# Patient Record
Sex: MALE | Race: WHITE | Employment: OTHER | URBAN - NONMETROPOLITAN AREA
[De-identification: names, ages, dates, MRNs, and addresses within clinical notes are randomized per-mention and may not be internally consistent; named-entity substitution may affect disease eponyms.]

---

## 2017-04-12 RX ORDER — AMLODIPINE BESYLATE 5 MG/1
TABLET ORAL
Qty: 30 TABLET | Refills: 3 | Status: SHIPPED | OUTPATIENT
Start: 2017-04-12 | End: 2017-08-16 | Stop reason: SDUPTHER

## 2017-09-01 ENCOUNTER — HOSPITAL ENCOUNTER (OUTPATIENT)
Dept: NON INVASIVE DIAGNOSTICS | Age: 77
Discharge: HOME | End: 2017-09-01
Payer: MEDICARE

## 2017-09-01 ENCOUNTER — OFFICE VISIT (OUTPATIENT)
Dept: PRIMARY CARE CLINIC | Age: 77
End: 2017-09-01
Payer: MEDICARE

## 2017-09-01 VITALS
BODY MASS INDEX: 37.35 KG/M2 | WEIGHT: 267.8 LBS | TEMPERATURE: 98 F | RESPIRATION RATE: 16 BRPM | SYSTOLIC BLOOD PRESSURE: 144 MMHG | DIASTOLIC BLOOD PRESSURE: 72 MMHG | OXYGEN SATURATION: 95 % | HEART RATE: 68 BPM

## 2017-09-01 DIAGNOSIS — I49.9 IRREGULAR HEART BEAT: Primary | ICD-10-CM

## 2017-09-01 LAB
ANION GAP SERPL CALCULATED.3IONS-SCNC: 17 MMOL/L (ref 7–19)
BUN BLDV-MCNC: 36 MG/DL (ref 8–23)
CALCIUM SERPL-MCNC: 9.5 MG/DL (ref 8.8–10.2)
CHLORIDE BLD-SCNC: 101 MMOL/L (ref 98–111)
CO2: 21 MMOL/L (ref 22–29)
CREAT SERPL-MCNC: 1.3 MG/DL (ref 0.5–1.2)
GFR NON-AFRICAN AMERICAN: 53
GLUCOSE BLD-MCNC: 106 MG/DL (ref 74–109)
POTASSIUM SERPL-SCNC: 4.3 MMOL/L (ref 3.5–5)
SODIUM BLD-SCNC: 139 MMOL/L (ref 136–145)
TSH SERPL DL<=0.05 MIU/L-ACNC: 2.69 UIU/ML (ref 0.27–4.2)

## 2017-09-01 PROCEDURE — 36415 COLL VENOUS BLD VENIPUNCTURE: CPT | Performed by: FAMILY MEDICINE

## 2017-09-01 PROCEDURE — 93225 XTRNL ECG REC<48 HRS REC: CPT

## 2017-09-01 PROCEDURE — 93227 XTRNL ECG REC<48 HR R&I: CPT | Performed by: INTERNAL MEDICINE

## 2017-09-01 PROCEDURE — 93000 ELECTROCARDIOGRAM COMPLETE: CPT | Performed by: FAMILY MEDICINE

## 2017-09-01 PROCEDURE — 99213 OFFICE O/P EST LOW 20 MIN: CPT | Performed by: FAMILY MEDICINE

## 2017-09-01 PROCEDURE — 93226 XTRNL ECG REC<48 HR SCAN A/R: CPT

## 2017-09-10 ASSESSMENT — ENCOUNTER SYMPTOMS
SHORTNESS OF BREATH: 0
COUGH: 0

## 2017-11-13 ENCOUNTER — HOSPITAL ENCOUNTER (OUTPATIENT)
Dept: VASCULAR LAB | Age: 77
Discharge: HOME | End: 2017-11-13
Payer: MEDICARE

## 2017-11-13 ENCOUNTER — TELEPHONE (OUTPATIENT)
Dept: VASCULAR SURGERY | Age: 77
End: 2017-11-13

## 2017-11-13 DIAGNOSIS — I65.23 BILATERAL CAROTID ARTERY STENOSIS: ICD-10-CM

## 2017-11-13 PROCEDURE — 93880 EXTRACRANIAL BILAT STUDY: CPT

## 2017-11-13 NOTE — TELEPHONE ENCOUNTER
I gave the patient his results. The patient voiced understanding.     ----- Message from SHELLY Elder sent at 11/13/2017  1:11 PM CST -----  Looks good.   12 months with carotid u/s

## 2017-11-15 RX ORDER — AMLODIPINE BESYLATE 5 MG/1
TABLET ORAL
Qty: 30 TABLET | Refills: 2 | Status: SHIPPED | OUTPATIENT
Start: 2017-11-15 | End: 2018-02-02 | Stop reason: DRUGHIGH

## 2017-11-24 ENCOUNTER — HOSPITAL ENCOUNTER (EMERGENCY)
Age: 77
Discharge: HOME | End: 2017-11-24
Attending: EMERGENCY MEDICINE
Payer: MEDICARE

## 2017-11-24 ENCOUNTER — OFFICE VISIT (OUTPATIENT)
Dept: URGENT CARE | Age: 77
End: 2017-11-24

## 2017-11-24 ENCOUNTER — APPOINTMENT (OUTPATIENT)
Dept: GENERAL RADIOLOGY | Age: 77
End: 2017-11-24
Payer: MEDICARE

## 2017-11-24 VITALS
WEIGHT: 260 LBS | RESPIRATION RATE: 16 BRPM | BODY MASS INDEX: 36.4 KG/M2 | DIASTOLIC BLOOD PRESSURE: 90 MMHG | HEART RATE: 75 BPM | OXYGEN SATURATION: 99 % | SYSTOLIC BLOOD PRESSURE: 145 MMHG | TEMPERATURE: 97.5 F | HEIGHT: 71 IN

## 2017-11-24 VITALS
RESPIRATION RATE: 18 BRPM | TEMPERATURE: 98.4 F | HEART RATE: 98 BPM | OXYGEN SATURATION: 94 % | DIASTOLIC BLOOD PRESSURE: 80 MMHG | SYSTOLIC BLOOD PRESSURE: 154 MMHG

## 2017-11-24 DIAGNOSIS — R73.9 HYPERGLYCEMIA: Primary | ICD-10-CM

## 2017-11-24 DIAGNOSIS — S92.505A CLOSED NONDISPLACED FRACTURE OF PHALANX OF LESSER TOE OF LEFT FOOT, UNSPECIFIED PHALANX, INITIAL ENCOUNTER: ICD-10-CM

## 2017-11-24 DIAGNOSIS — M79.89 FOOT SWELLING: Primary | ICD-10-CM

## 2017-11-24 DIAGNOSIS — L08.9 SOFT TISSUE INFECTION OF FOOT: ICD-10-CM

## 2017-11-24 LAB
ALBUMIN SERPL-MCNC: 4.2 G/DL (ref 3.5–5.2)
ALP BLD-CCNC: 73 U/L (ref 40–130)
ALT SERPL-CCNC: 54 U/L (ref 5–41)
ANION GAP SERPL CALCULATED.3IONS-SCNC: 12 MMOL/L (ref 7–19)
APTT: 32 SEC (ref 26–36.2)
AST SERPL-CCNC: 27 U/L (ref 5–40)
BASOPHILS ABSOLUTE: 0 K/UL (ref 0–0.2)
BASOPHILS RELATIVE PERCENT: 0.3 % (ref 0–1)
BILIRUB SERPL-MCNC: 0.3 MG/DL (ref 0.2–1.2)
BUN BLDV-MCNC: 19 MG/DL (ref 8–23)
CALCIUM SERPL-MCNC: 9 MG/DL (ref 8.8–10.2)
CHLORIDE BLD-SCNC: 100 MMOL/L (ref 98–111)
CO2: 25 MMOL/L (ref 22–29)
CREAT SERPL-MCNC: 1.2 MG/DL (ref 0.5–1.2)
EOSINOPHILS ABSOLUTE: 0.1 K/UL (ref 0–0.6)
EOSINOPHILS RELATIVE PERCENT: 1 % (ref 0–5)
GFR NON-AFRICAN AMERICAN: 58
GLUCOSE BLD-MCNC: 218 MG/DL (ref 74–109)
HCT VFR BLD CALC: 44.8 % (ref 42–52)
HEMOGLOBIN: 14.9 G/DL (ref 14–18)
INR BLD: 1 (ref 0.88–1.18)
LYMPHOCYTES ABSOLUTE: 1.7 K/UL (ref 1.1–4.5)
LYMPHOCYTES RELATIVE PERCENT: 21.6 % (ref 20–40)
MCH RBC QN AUTO: 32 PG (ref 27–31)
MCHC RBC AUTO-ENTMCNC: 33.3 G/DL (ref 33–37)
MCV RBC AUTO: 96.3 FL (ref 80–94)
MONOCYTES ABSOLUTE: 0.5 K/UL (ref 0–0.9)
MONOCYTES RELATIVE PERCENT: 6.8 % (ref 0–10)
NEUTROPHILS ABSOLUTE: 5.4 K/UL (ref 1.5–7.5)
NEUTROPHILS RELATIVE PERCENT: 69.9 % (ref 50–65)
PDW BLD-RTO: 12.8 % (ref 11.5–14.5)
PLATELET # BLD: 97 K/UL (ref 130–400)
PMV BLD AUTO: 12.6 FL (ref 9.4–12.4)
POTASSIUM SERPL-SCNC: 4.3 MMOL/L (ref 3.5–5)
PROTHROMBIN TIME: 13.1 SEC (ref 12–14.6)
RBC # BLD: 4.65 M/UL (ref 4.7–6.1)
SODIUM BLD-SCNC: 137 MMOL/L (ref 136–145)
TOTAL PROTEIN: 7.4 G/DL (ref 6.6–8.7)
WBC # BLD: 7.8 K/UL (ref 4.8–10.8)

## 2017-11-24 PROCEDURE — 85025 COMPLETE CBC W/AUTO DIFF WBC: CPT

## 2017-11-24 PROCEDURE — 80053 COMPREHEN METABOLIC PANEL: CPT

## 2017-11-24 PROCEDURE — 99284 EMERGENCY DEPT VISIT MOD MDM: CPT | Performed by: EMERGENCY MEDICINE

## 2017-11-24 PROCEDURE — 36415 COLL VENOUS BLD VENIPUNCTURE: CPT

## 2017-11-24 PROCEDURE — 73630 X-RAY EXAM OF FOOT: CPT

## 2017-11-24 PROCEDURE — 85610 PROTHROMBIN TIME: CPT

## 2017-11-24 PROCEDURE — 93971 EXTREMITY STUDY: CPT

## 2017-11-24 PROCEDURE — 99999 PR OFFICE/OUTPT VISIT,PROCEDURE ONLY: CPT | Performed by: NURSE PRACTITIONER

## 2017-11-24 PROCEDURE — 99284 EMERGENCY DEPT VISIT MOD MDM: CPT

## 2017-11-24 PROCEDURE — 85730 THROMBOPLASTIN TIME PARTIAL: CPT

## 2017-11-24 RX ORDER — HYDROCODONE BITARTRATE AND ACETAMINOPHEN 5; 325 MG/1; MG/1
1 TABLET ORAL EVERY 6 HOURS PRN
Qty: 15 TABLET | Refills: 0 | Status: SHIPPED | OUTPATIENT
Start: 2017-11-24 | End: 2017-12-01

## 2017-11-24 RX ORDER — CEPHALEXIN 500 MG/1
500 CAPSULE ORAL 2 TIMES DAILY
Qty: 20 CAPSULE | Refills: 0 | Status: SHIPPED | OUTPATIENT
Start: 2017-11-24 | End: 2018-02-02 | Stop reason: CLARIF

## 2017-11-24 RX ORDER — HYDROCODONE BITARTRATE AND ACETAMINOPHEN 5; 325 MG/1; MG/1
1 TABLET ORAL ONCE
Status: DISCONTINUED | OUTPATIENT
Start: 2017-11-24 | End: 2017-11-24 | Stop reason: HOSPADM

## 2017-11-24 ASSESSMENT — PAIN SCALES - GENERAL: PAINLEVEL_OUTOF10: 2

## 2017-11-24 NOTE — ED PROVIDER NOTES
respiratory distress. He has no wheezes. He has no rales. He exhibits no tenderness. Abdominal: Soft. Bowel sounds are normal. He exhibits no distension. There is no tenderness. There is no rebound and no guarding. Lymphadenopathy:     He has no cervical adenopathy. Neurological: He is alert and oriented to person, place, and time. Skin: Skin is warm and dry. He is not diaphoretic. Psychiatric: He has a normal mood and affect. Nursing note and vitals reviewed. DIAGNOSTIC RESULTS     RADIOLOGY:   Non-plain film images such as CT, Ultrasound and MRI are read by the radiologist.   Interpretation per the Radiologist below, if available at the time of this note:    VL Extremity Venous Left   Final Result      XR FOOT LEFT (MIN 3 VIEWS)   Final Result          LABS:  Labs Reviewed   CBC WITH AUTO DIFFERENTIAL - Abnormal; Notable for the following:        Result Value    RBC 4.65 (*)     MCV 96.3 (*)     MCH 32.0 (*)     Platelets 97 (*)     MPV 12.6 (*)     Neutrophils % 69.9 (*)     All other components within normal limits   COMPREHENSIVE METABOLIC PANEL - Abnormal; Notable for the following:     Glucose 218 (*)     GFR Non- 58 (*)     ALT 54 (*)     All other components within normal limits   PROTIME-INR   APTT       All other labs were within normal range or not returned as of this dictation.     EMERGENCY DEPARTMENT COURSE and DIFFERENTIAL DIAGNOSIS/MDM:   Vitals:    Vitals:    11/24/17 0933 11/24/17 0953 11/24/17 1103 11/24/17 1137   BP: (!) 157/90 (!) 158/90 (!) 151/88 (!) 145/90   Pulse: 89 85 79 75   Resp: 26 16 16 16   Temp: 98.2 °F (36.8 °C)   97.5 °F (36.4 °C)   TempSrc: Oral   Oral   SpO2: 91% 93% 91% 99%   Weight: 260 lb (117.9 kg)      Height: 5' 11\" (1.803 m)              MDM  Number of Diagnoses or Management Options  Closed nondisplaced fracture of phalanx of lesser toe of left foot, unspecified phalanx, initial encounter:   Hyperglycemia:   Soft tissue HYDROcodone-acetaminophen (NORCO) 5-325 MG per tablet Take 1 tablet by mouth every 6 hours as needed for Pain ., Disp-15 tablet, R-0Print             (Please note that portions of this note were completed with a voice recognition program.  Efforts were made to edit the dictations but occasionally words are mis-transcribed.)    ELIAS Arteaga [place]  11/24/17 1567

## 2017-11-24 NOTE — PROGRESS NOTES
Left lower extremity venous duplex performed. No DVT SVT or REFLUX noted   This is a preliminary report. Final report pending.

## 2017-11-27 ENCOUNTER — OFFICE VISIT (OUTPATIENT)
Dept: PRIMARY CARE CLINIC | Age: 77
End: 2017-11-27
Payer: MEDICARE

## 2017-11-27 VITALS
DIASTOLIC BLOOD PRESSURE: 90 MMHG | HEIGHT: 70 IN | WEIGHT: 270 LBS | TEMPERATURE: 98.4 F | RESPIRATION RATE: 18 BRPM | HEART RATE: 88 BPM | OXYGEN SATURATION: 93 % | SYSTOLIC BLOOD PRESSURE: 140 MMHG | BODY MASS INDEX: 38.65 KG/M2

## 2017-11-27 DIAGNOSIS — S92.345D CLOSED NONDISPLACED FRACTURE OF FOURTH METATARSAL BONE OF LEFT FOOT WITH ROUTINE HEALING, SUBSEQUENT ENCOUNTER: Primary | ICD-10-CM

## 2017-11-27 PROCEDURE — 1036F TOBACCO NON-USER: CPT | Performed by: FAMILY MEDICINE

## 2017-11-27 PROCEDURE — G8598 ASA/ANTIPLAT THER USED: HCPCS | Performed by: FAMILY MEDICINE

## 2017-11-27 PROCEDURE — 4040F PNEUMOC VAC/ADMIN/RCVD: CPT | Performed by: FAMILY MEDICINE

## 2017-11-27 PROCEDURE — G8427 DOCREV CUR MEDS BY ELIG CLIN: HCPCS | Performed by: FAMILY MEDICINE

## 2017-11-27 PROCEDURE — 1123F ACP DISCUSS/DSCN MKR DOCD: CPT | Performed by: FAMILY MEDICINE

## 2017-11-27 PROCEDURE — G8417 CALC BMI ABV UP PARAM F/U: HCPCS | Performed by: FAMILY MEDICINE

## 2017-11-27 PROCEDURE — G8483 FLU IMM NO ADMIN DOC REA: HCPCS | Performed by: FAMILY MEDICINE

## 2017-11-27 PROCEDURE — 99213 OFFICE O/P EST LOW 20 MIN: CPT | Performed by: FAMILY MEDICINE

## 2017-12-09 ASSESSMENT — ENCOUNTER SYMPTOMS: RESPIRATORY NEGATIVE: 1

## 2017-12-11 ENCOUNTER — PROCEDURE VISIT (OUTPATIENT)
Dept: OTOLARYNGOLOGY | Facility: CLINIC | Age: 77
End: 2017-12-11

## 2017-12-11 ENCOUNTER — NURSE ONLY (OUTPATIENT)
Dept: PRIMARY CARE CLINIC | Age: 77
End: 2017-12-11
Payer: MEDICARE

## 2017-12-11 VITALS
SYSTOLIC BLOOD PRESSURE: 160 MMHG | TEMPERATURE: 97.9 F | RESPIRATION RATE: 16 BRPM | DIASTOLIC BLOOD PRESSURE: 90 MMHG | HEART RATE: 85 BPM | OXYGEN SATURATION: 97 % | WEIGHT: 264.6 LBS | BODY MASS INDEX: 37.97 KG/M2

## 2017-12-11 DIAGNOSIS — M67.449 DIGITAL MUCINOUS CYST: ICD-10-CM

## 2017-12-11 DIAGNOSIS — M10.9 GOUT OF FOOT, UNSPECIFIED CAUSE, UNSPECIFIED CHRONICITY, UNSPECIFIED LATERALITY: ICD-10-CM

## 2017-12-11 DIAGNOSIS — H90.3 SENSORINEURAL HEARING LOSS (SNHL) OF BOTH EARS: Primary | ICD-10-CM

## 2017-12-11 DIAGNOSIS — I10 ESSENTIAL HYPERTENSION: Primary | ICD-10-CM

## 2017-12-11 LAB — URIC ACID, SERUM: 7.8 MG/DL (ref 3.4–7)

## 2017-12-11 PROCEDURE — 36415 COLL VENOUS BLD VENIPUNCTURE: CPT | Performed by: FAMILY MEDICINE

## 2017-12-11 PROCEDURE — HEARINGNOCHG: Performed by: OTOLARYNGOLOGY

## 2017-12-11 PROCEDURE — 99213 OFFICE O/P EST LOW 20 MIN: CPT | Performed by: FAMILY MEDICINE

## 2017-12-11 ASSESSMENT — ENCOUNTER SYMPTOMS: RESPIRATORY NEGATIVE: 1

## 2017-12-11 NOTE — PROGRESS NOTES
Patient came in with his left HA not working and the top casing coming off. I cleaned both HA's, changed the top casing on both, new waxguards, new wind screens, and new batteries.    Patient stated that he has never been able to hear well out of them and felt lied to by Jose J Hernandez when he bought them. He said he did not feel that Jose J even knew how to program them or just did not care to. He wears them in [employer] all the time, due to static sounds in the Master program.    I hooked him up today and redid the feedback test and sensogram. I then had to decrease the overall mid's and high's due to the patient's voice sounding like he was in a barrel. He said that they were much better, not perfect but better. He also said that the static in Master was no longer there. He will call back after the first of the year to make some more adjustments, when he has more time. Patient has his wife and dog in the car. He was very happy that someone took the time to help him.    No charge per HA agreement.

## 2017-12-12 NOTE — PROGRESS NOTES
Subjective:      Patient ID: Alexi Castano is a 65 y.o. male who came in today for a uric acid level but blood pressure was elevated. HPI. He says he has been eating country ham but denies any chest pain or shortness of breath. He denies headache or visual changes. He wants to know if he needs to be on allopurinol but he doesn't really want to start it unless he has another attack. He is concerned about a cyst on his middle finger which is getting bigger. It does rub against his 2nd finger and causes discomfort. He does not smoke. Family history is positive for hypertension. Review of Systems   Constitutional: Negative. Eyes: Negative for visual disturbance. Respiratory: Negative. Cardiovascular: Negative. Musculoskeletal: Positive for arthralgias. Neurological: Negative for light-headedness and headaches. Hematological: Negative. Objective:   Physical Exam   Constitutional: He is oriented to person, place, and time. He appears well-developed and well-nourished. No distress. HENT:   Head: Normocephalic. Right Ear: External ear normal.   Left Ear: External ear normal.   Mouth/Throat: Oropharynx is clear and moist.   Eyes: Conjunctivae are normal. Pupils are equal, round, and reactive to light. Neck: Normal range of motion. Neck supple. No JVD present. No thyromegaly present. Cardiovascular: Normal rate, regular rhythm, normal heart sounds and intact distal pulses. Pulmonary/Chest: Effort normal and breath sounds normal. No respiratory distress. Abdominal: Soft. Bowel sounds are normal. There is no tenderness. Musculoskeletal: Normal range of motion. He exhibits no edema. He has a large mucin digital cyst on his 3rd finger right hand   Neurological: He is alert and oriented to person, place, and time. Skin: Skin is warm and dry. Psychiatric: He has a normal mood and affect.  His behavior is normal. Judgment and thought content normal.   Vitals reviewed. Assessment:      1. Essential hypertension    2. Digital mucinous cyst    3. Gout of foot, unspecified cause, unspecified chronicity, unspecified laterality            Plan:      MEDICATIONS:  No orders of the defined types were placed in this encounter. ORDERS:  Orders Placed This Encounter   Procedures    Uric Acid   Crystal Diop MD        I'm going to refer him to Dr. Jeanna Bennett for further evaluation of the digital cyst after [place]. I am going to increase his Diovan HCT. Continue amlodipine. Recheck blood pressure in 2 weeks. We will also check a uric acid level. Follow-up:  Return in about 2 weeks (around 12/26/2017) for Blood pressure recheck-nurse. PATIENT INSTRUCTIONS:  Patient Instructions     Continue amlodipine 5 mg 1 tablet at bedtime    Double-up on the Diovan 160/12.5 mg and take 2 tablets once a day in the morning    Recheck blood pressure here on December 26. Patient Education        Gout: Care Instructions  Your Care Instructions    Gout is a form of arthritis caused by a buildup of uric acid crystals in a joint. It causes sudden attacks of pain, swelling, redness, and stiffness, usually in one joint, especially the big toe. Gout usually comes on without a cause. But it can be brought on by drinking alcohol (especially beer) or eating seafood and red meat. Taking certain medicines, such as diuretics or aspirin, also can bring on an attack of gout. Taking your medicines as prescribed and following up with your doctor regularly can help you avoid gout attacks in the future. Follow-up care is a key part of your treatment and safety. Be sure to make and go to all appointments, and call your doctor if you are having problems. It's also a good idea to know your test results and keep a list of the medicines you take. How can you care for yourself at home?   · If the joint is swollen, put ice or a cold pack on the area for 10 to 20 minutes at condition or this instruction, always ask your healthcare professional. [place] [address] any warranty or liability for your use of this information.

## 2017-12-26 RX ORDER — VALSARTAN AND HYDROCHLOROTHIAZIDE 160; 12.5 MG/1; MG/1
TABLET, FILM COATED ORAL
Qty: 90 TABLET | Refills: 3 | Status: SHIPPED | OUTPATIENT
Start: 2017-12-26 | End: 2018-02-02 | Stop reason: ALTCHOICE

## 2017-12-27 ENCOUNTER — TELEPHONE (OUTPATIENT)
Dept: PRIMARY CARE CLINIC | Age: 77
End: 2017-12-27

## 2017-12-27 ENCOUNTER — NURSE ONLY (OUTPATIENT)
Dept: PRIMARY CARE CLINIC | Age: 77
End: 2017-12-27
Payer: MEDICARE

## 2017-12-27 DIAGNOSIS — E78.2 MIXED HYPERLIPIDEMIA: ICD-10-CM

## 2017-12-27 DIAGNOSIS — Z79.899 MEDICATION MANAGEMENT: ICD-10-CM

## 2017-12-27 DIAGNOSIS — Z12.5 SCREENING FOR PROSTATE CANCER: ICD-10-CM

## 2017-12-27 DIAGNOSIS — I10 ESSENTIAL HYPERTENSION: Primary | ICD-10-CM

## 2017-12-27 LAB
ALBUMIN SERPL-MCNC: 4.4 G/DL (ref 3.5–5.2)
ALP BLD-CCNC: 62 U/L (ref 40–130)
ALT SERPL-CCNC: 105 U/L (ref 5–41)
ANION GAP SERPL CALCULATED.3IONS-SCNC: 15 MMOL/L (ref 7–19)
AST SERPL-CCNC: 69 U/L (ref 5–40)
BILIRUB SERPL-MCNC: 0.5 MG/DL (ref 0.2–1.2)
BUN BLDV-MCNC: 22 MG/DL (ref 8–23)
CALCIUM SERPL-MCNC: 9.5 MG/DL (ref 8.8–10.2)
CHLORIDE BLD-SCNC: 101 MMOL/L (ref 98–111)
CHOLESTEROL, TOTAL: 178 MG/DL (ref 160–199)
CO2: 23 MMOL/L (ref 22–29)
CREAT SERPL-MCNC: 1.2 MG/DL (ref 0.5–1.2)
GFR NON-AFRICAN AMERICAN: 58
GLUCOSE BLD-MCNC: 110 MG/DL (ref 74–109)
HCT VFR BLD CALC: 46.3 % (ref 42–52)
HDLC SERPL-MCNC: 35 MG/DL (ref 55–121)
HEMOGLOBIN: 15.5 G/DL (ref 14–18)
LDL CHOLESTEROL CALCULATED: 110 MG/DL
MCH RBC QN AUTO: 31.6 PG (ref 27–31)
MCHC RBC AUTO-ENTMCNC: 33.5 G/DL (ref 33–37)
MCV RBC AUTO: 94.5 FL (ref 80–94)
PDW BLD-RTO: 12.8 % (ref 11.5–14.5)
PLATELET # BLD: 118 K/UL (ref 130–400)
PMV BLD AUTO: 12.9 FL (ref 9.4–12.4)
POTASSIUM SERPL-SCNC: 4.7 MMOL/L (ref 3.5–5)
PROSTATE SPECIFIC ANTIGEN: 1.57 NG/ML (ref 0–4)
RBC # BLD: 4.9 M/UL (ref 4.7–6.1)
SODIUM BLD-SCNC: 139 MMOL/L (ref 136–145)
TOTAL PROTEIN: 7.7 G/DL (ref 6.6–8.7)
TRIGL SERPL-MCNC: 163 MG/DL (ref 0–149)
WBC # BLD: 5.7 K/UL (ref 4.8–10.8)

## 2017-12-27 PROCEDURE — 36415 COLL VENOUS BLD VENIPUNCTURE: CPT | Performed by: FAMILY MEDICINE

## 2017-12-27 NOTE — TELEPHONE ENCOUNTER
Pt came by office and states Dr Reilly Rondon doubled his Diovan Hct 160/12.5 to 2 a day at last OV. He is running out of medication. Tried to refill for him but insurance will not cover at 2 daily . [facility] will refill at dose as ordered by Dr Reilly Rondon and pt will pay out of pocket $25.  Will need to re-evaluate before next refill . Pt does not think he will need to stay on higher dose.  Will come back in for b/p check and re-evaluation of med before next refill--FYI

## 2018-01-29 ENCOUNTER — NURSE ONLY (OUTPATIENT)
Dept: PRIMARY CARE CLINIC | Age: 78
End: 2018-01-29
Payer: MEDICARE

## 2018-01-29 DIAGNOSIS — E79.0 ELEVATED URIC ACID IN BLOOD: ICD-10-CM

## 2018-01-29 DIAGNOSIS — R74.8 ELEVATED LIVER ENZYMES: ICD-10-CM

## 2018-01-29 DIAGNOSIS — R79.89 ABNORMAL CBC: Primary | ICD-10-CM

## 2018-01-29 DIAGNOSIS — E79.0 ELEVATED BLOOD URIC ACID LEVEL: Primary | ICD-10-CM

## 2018-01-29 DIAGNOSIS — D69.6 THROMBOCYTOPENIA (HCC): ICD-10-CM

## 2018-01-29 LAB
ALBUMIN SERPL-MCNC: 4.5 G/DL (ref 3.5–5.2)
ALP BLD-CCNC: 56 U/L (ref 40–130)
ALT SERPL-CCNC: 37 U/L (ref 5–41)
AST SERPL-CCNC: 30 U/L (ref 5–40)
BILIRUB SERPL-MCNC: 0.6 MG/DL (ref 0.2–1.2)
BILIRUBIN DIRECT: 0.1 MG/DL (ref 0–0.3)
BILIRUBIN, INDIRECT: 0.5 MG/DL (ref 0.1–1)
HCT VFR BLD CALC: 49.1 % (ref 42–52)
HEMOGLOBIN: 15.9 G/DL (ref 14–18)
MCH RBC QN AUTO: 31.4 PG (ref 27–31)
MCHC RBC AUTO-ENTMCNC: 32.4 G/DL (ref 33–37)
MCV RBC AUTO: 96.8 FL (ref 80–94)
PDW BLD-RTO: 13.2 % (ref 11.5–14.5)
PLATELET # BLD: 111 K/UL (ref 130–400)
PMV BLD AUTO: 12.9 FL (ref 9.4–12.4)
RBC # BLD: 5.07 M/UL (ref 4.7–6.1)
TOTAL PROTEIN: 8.1 G/DL (ref 6.6–8.7)
URIC ACID, SERUM: 10 MG/DL (ref 3.4–7)
WBC # BLD: 5.9 K/UL (ref 4.8–10.8)

## 2018-01-29 PROCEDURE — 36415 COLL VENOUS BLD VENIPUNCTURE: CPT | Performed by: FAMILY MEDICINE

## 2018-01-29 RX ORDER — ALLOPURINOL 100 MG/1
TABLET ORAL
Qty: 30 TABLET | Refills: 3 | Status: SHIPPED | OUTPATIENT
Start: 2018-01-29 | End: 2018-03-14 | Stop reason: CLARIF

## 2018-02-02 ENCOUNTER — OFFICE VISIT (OUTPATIENT)
Dept: PRIMARY CARE CLINIC | Age: 78
End: 2018-02-02
Payer: MEDICARE

## 2018-02-02 VITALS
HEIGHT: 70 IN | WEIGHT: 248 LBS | RESPIRATION RATE: 20 BRPM | TEMPERATURE: 97.2 F | HEART RATE: 73 BPM | SYSTOLIC BLOOD PRESSURE: 164 MMHG | BODY MASS INDEX: 35.5 KG/M2 | DIASTOLIC BLOOD PRESSURE: 78 MMHG | OXYGEN SATURATION: 97 %

## 2018-02-02 DIAGNOSIS — M10.9 GOUT, UNSPECIFIED CAUSE, UNSPECIFIED CHRONICITY, UNSPECIFIED SITE: ICD-10-CM

## 2018-02-02 DIAGNOSIS — I10 ESSENTIAL HYPERTENSION: Primary | ICD-10-CM

## 2018-02-02 PROCEDURE — G8427 DOCREV CUR MEDS BY ELIG CLIN: HCPCS | Performed by: FAMILY MEDICINE

## 2018-02-02 PROCEDURE — 1123F ACP DISCUSS/DSCN MKR DOCD: CPT | Performed by: FAMILY MEDICINE

## 2018-02-02 PROCEDURE — 4040F PNEUMOC VAC/ADMIN/RCVD: CPT | Performed by: FAMILY MEDICINE

## 2018-02-02 PROCEDURE — G8598 ASA/ANTIPLAT THER USED: HCPCS | Performed by: FAMILY MEDICINE

## 2018-02-02 PROCEDURE — 99213 OFFICE O/P EST LOW 20 MIN: CPT | Performed by: FAMILY MEDICINE

## 2018-02-02 PROCEDURE — 1036F TOBACCO NON-USER: CPT | Performed by: FAMILY MEDICINE

## 2018-02-02 PROCEDURE — G8483 FLU IMM NO ADMIN DOC REA: HCPCS | Performed by: FAMILY MEDICINE

## 2018-02-02 PROCEDURE — G8417 CALC BMI ABV UP PARAM F/U: HCPCS | Performed by: FAMILY MEDICINE

## 2018-02-02 RX ORDER — AMLODIPINE BESYLATE 10 MG/1
TABLET ORAL
Qty: 30 TABLET | Refills: 5 | Status: SHIPPED | OUTPATIENT
Start: 2018-02-02 | End: 2018-08-09 | Stop reason: SDUPTHER

## 2018-02-02 RX ORDER — CLONIDINE HYDROCHLORIDE 0.1 MG/1
TABLET ORAL
Qty: 60 TABLET | Refills: 3 | Status: SHIPPED | OUTPATIENT
Start: 2018-02-02 | End: 2018-08-20 | Stop reason: SDUPTHER

## 2018-02-02 RX ORDER — INDAPAMIDE 1.25 MG/1
TABLET, FILM COATED ORAL
Qty: 30 TABLET | Refills: 5 | Status: SHIPPED | OUTPATIENT
Start: 2018-02-02 | End: 2018-07-30 | Stop reason: SDUPTHER

## 2018-02-02 NOTE — PATIENT INSTRUCTIONS
Stop the Diovan HCT because we do not think it is helping. This is the one that you were taking 2 tablets in the morning. You have a new medication called clonidine 0.1 mg. Take 1 tablet twice a day for blood pressure. He will also have a new fluid pill called indapamide 1.25 mg. You take this 1 tablet once a day in the morning. You are still on amlodipine 5 mg but I am increasing it to 10 mg. This is the one that you take at night. You may take 2 of the 5 mg tablets you have at home until they are gone but I have seen in a new prescription for 1 10 mg tablet. Call me with your blood pressure value from home in one week or run up here and let the nurse check it. I will recheck your blood pressure and lab work with a nurse in 6 weeks. We will check your kidney function, electrolytes and a uric acid level. Patient Education        Gout: Care Instructions  Your Care Instructions    Gout is a form of arthritis caused by a buildup of uric acid crystals in a joint. It causes sudden attacks of pain, swelling, redness, and stiffness, usually in one joint, especially the big toe. Gout usually comes on without a cause. But it can be brought on by drinking alcohol (especially beer) or eating seafood and red meat. Taking certain medicines, such as diuretics or aspirin, also can bring on an attack of gout. Taking your medicines as prescribed and following up with your doctor regularly can help you avoid gout attacks in the future. Follow-up care is a key part of your treatment and safety. Be sure to make and go to all appointments, and call your doctor if you are having problems. It's also a good idea to know your test results and keep a list of the medicines you take. How can you care for yourself at home? · If the joint is swollen, put ice or a cold pack on the area for 10 to 20 minutes at a time. Put a thin cloth between the ice and your skin.   · Prop up the sore limb on a pillow when you ice it or

## 2018-02-04 ASSESSMENT — ENCOUNTER SYMPTOMS: RESPIRATORY NEGATIVE: 1

## 2018-03-09 ENCOUNTER — NURSE ONLY (OUTPATIENT)
Dept: PRIMARY CARE CLINIC | Age: 78
End: 2018-03-09
Payer: MEDICARE

## 2018-03-09 VITALS — DIASTOLIC BLOOD PRESSURE: 74 MMHG | SYSTOLIC BLOOD PRESSURE: 118 MMHG

## 2018-03-09 DIAGNOSIS — D69.6 THROMBOCYTOPENIA (HCC): ICD-10-CM

## 2018-03-09 DIAGNOSIS — E79.0 ELEVATED BLOOD URIC ACID LEVEL: ICD-10-CM

## 2018-03-09 LAB
ANION GAP SERPL CALCULATED.3IONS-SCNC: 15 MMOL/L (ref 7–19)
BUN BLDV-MCNC: 26 MG/DL (ref 8–23)
CALCIUM SERPL-MCNC: 9.5 MG/DL (ref 8.8–10.2)
CHLORIDE BLD-SCNC: 99 MMOL/L (ref 98–111)
CO2: 24 MMOL/L (ref 22–29)
CREAT SERPL-MCNC: 1.5 MG/DL (ref 0.5–1.2)
GFR NON-AFRICAN AMERICAN: 45
GLUCOSE BLD-MCNC: 122 MG/DL (ref 74–109)
HCT VFR BLD CALC: 46.1 % (ref 42–52)
HEMOGLOBIN: 15 G/DL (ref 14–18)
MCH RBC QN AUTO: 31.2 PG (ref 27–31)
MCHC RBC AUTO-ENTMCNC: 32.5 G/DL (ref 33–37)
MCV RBC AUTO: 95.8 FL (ref 80–94)
PDW BLD-RTO: 13.2 % (ref 11.5–14.5)
PLATELET # BLD: 109 K/UL (ref 130–400)
PMV BLD AUTO: 13.2 FL (ref 9.4–12.4)
POTASSIUM SERPL-SCNC: 4.7 MMOL/L (ref 3.5–5)
RBC # BLD: 4.81 M/UL (ref 4.7–6.1)
SODIUM BLD-SCNC: 138 MMOL/L (ref 136–145)
URIC ACID, SERUM: 10.1 MG/DL (ref 3.4–7)
WBC # BLD: 5.6 K/UL (ref 4.8–10.8)

## 2018-03-09 PROCEDURE — 36415 COLL VENOUS BLD VENIPUNCTURE: CPT | Performed by: FAMILY MEDICINE

## 2018-03-14 RX ORDER — ALLOPURINOL 100 MG/1
100 TABLET ORAL 2 TIMES DAILY
Qty: 60 TABLET | Refills: 3 | Status: SHIPPED | OUTPATIENT
Start: 2018-03-14 | End: 2018-07-22 | Stop reason: SDUPTHER

## 2018-03-14 RX ORDER — ALLOPURINOL 100 MG/1
100 TABLET ORAL 2 TIMES DAILY
COMMUNITY
End: 2018-03-14 | Stop reason: SDUPTHER

## 2018-04-12 ENCOUNTER — NURSE ONLY (OUTPATIENT)
Dept: PRIMARY CARE CLINIC | Age: 78
End: 2018-04-12
Payer: MEDICARE

## 2018-04-12 DIAGNOSIS — R94.5 ABNORMAL LIVER FUNCTION: ICD-10-CM

## 2018-04-12 DIAGNOSIS — D69.6 PLATELETS DECREASED (HCC): Primary | ICD-10-CM

## 2018-04-12 DIAGNOSIS — N28.9 ABNORMAL KIDNEY FUNCTION: ICD-10-CM

## 2018-04-12 LAB
ALBUMIN SERPL-MCNC: 4.3 G/DL (ref 3.5–5.2)
ALP BLD-CCNC: 57 U/L (ref 40–130)
ALT SERPL-CCNC: 55 U/L (ref 5–41)
ANION GAP SERPL CALCULATED.3IONS-SCNC: 16 MMOL/L (ref 7–19)
AST SERPL-CCNC: 43 U/L (ref 5–40)
BASOPHILS ABSOLUTE: 0 K/UL (ref 0–0.2)
BASOPHILS RELATIVE PERCENT: 0.2 % (ref 0–1)
BILIRUB SERPL-MCNC: 0.5 MG/DL (ref 0.2–1.2)
BUN BLDV-MCNC: 22 MG/DL (ref 8–23)
CALCIUM SERPL-MCNC: 9.5 MG/DL (ref 8.8–10.2)
CHLORIDE BLD-SCNC: 97 MMOL/L (ref 98–111)
CO2: 24 MMOL/L (ref 22–29)
CREAT SERPL-MCNC: 1.3 MG/DL (ref 0.5–1.2)
EOSINOPHILS ABSOLUTE: 0.1 K/UL (ref 0–0.6)
EOSINOPHILS RELATIVE PERCENT: 2.2 % (ref 0–5)
GFR NON-AFRICAN AMERICAN: 53
GLUCOSE BLD-MCNC: 128 MG/DL (ref 74–109)
HCT VFR BLD CALC: 48.2 % (ref 42–52)
HEMOGLOBIN: 16 G/DL (ref 14–18)
LYMPHOCYTES ABSOLUTE: 2 K/UL (ref 1.1–4.5)
LYMPHOCYTES RELATIVE PERCENT: 36.8 % (ref 20–40)
MCH RBC QN AUTO: 31.7 PG (ref 27–31)
MCHC RBC AUTO-ENTMCNC: 33.2 G/DL (ref 33–37)
MCV RBC AUTO: 95.4 FL (ref 80–94)
MONOCYTES ABSOLUTE: 0.4 K/UL (ref 0–0.9)
MONOCYTES RELATIVE PERCENT: 8.2 % (ref 0–10)
NEUTROPHILS ABSOLUTE: 2.8 K/UL (ref 1.5–7.5)
NEUTROPHILS RELATIVE PERCENT: 52.2 % (ref 50–65)
PDW BLD-RTO: 13.4 % (ref 11.5–14.5)
PLATELET # BLD: 110 K/UL (ref 130–400)
PMV BLD AUTO: 13 FL (ref 9.4–12.4)
POTASSIUM SERPL-SCNC: 4.3 MMOL/L (ref 3.5–5)
RBC # BLD: 5.05 M/UL (ref 4.7–6.1)
SODIUM BLD-SCNC: 137 MMOL/L (ref 136–145)
TOTAL PROTEIN: 7.6 G/DL (ref 6.6–8.7)
WBC # BLD: 5.4 K/UL (ref 4.8–10.8)

## 2018-04-12 PROCEDURE — 36415 COLL VENOUS BLD VENIPUNCTURE: CPT | Performed by: FAMILY MEDICINE

## 2018-07-31 RX ORDER — INDAPAMIDE 1.25 MG/1
TABLET, FILM COATED ORAL
Qty: 30 TABLET | Refills: 5 | Status: SHIPPED | OUTPATIENT
Start: 2018-07-31 | End: 2019-01-28 | Stop reason: SDUPTHER

## 2018-08-02 ENCOUNTER — NURSE ONLY (OUTPATIENT)
Dept: PRIMARY CARE CLINIC | Age: 78
End: 2018-08-02
Payer: MEDICARE

## 2018-08-02 DIAGNOSIS — I10 ESSENTIAL HYPERTENSION: Primary | ICD-10-CM

## 2018-08-02 LAB
ALBUMIN SERPL-MCNC: 4.4 G/DL (ref 3.5–5.2)
ALP BLD-CCNC: 62 U/L (ref 40–130)
ALT SERPL-CCNC: 47 U/L (ref 5–41)
ANION GAP SERPL CALCULATED.3IONS-SCNC: 18 MMOL/L (ref 7–19)
AST SERPL-CCNC: 39 U/L (ref 5–40)
BILIRUB SERPL-MCNC: 0.5 MG/DL (ref 0.2–1.2)
BUN BLDV-MCNC: 20 MG/DL (ref 8–23)
CALCIUM SERPL-MCNC: 9.6 MG/DL (ref 8.8–10.2)
CHLORIDE BLD-SCNC: 101 MMOL/L (ref 98–111)
CO2: 21 MMOL/L (ref 22–29)
CREAT SERPL-MCNC: 1.3 MG/DL (ref 0.5–1.2)
GFR NON-AFRICAN AMERICAN: 53
GLUCOSE BLD-MCNC: 120 MG/DL (ref 74–109)
HCT VFR BLD CALC: 46 % (ref 42–52)
HEMOGLOBIN: 15.1 G/DL (ref 14–18)
MCH RBC QN AUTO: 31.7 PG (ref 27–31)
MCHC RBC AUTO-ENTMCNC: 32.8 G/DL (ref 33–37)
MCV RBC AUTO: 96.4 FL (ref 80–94)
PDW BLD-RTO: 13.2 % (ref 11.5–14.5)
PLATELET # BLD: 100 K/UL (ref 130–400)
PMV BLD AUTO: 12.5 FL (ref 9.4–12.4)
POTASSIUM SERPL-SCNC: 4.6 MMOL/L (ref 3.5–5)
RBC # BLD: 4.77 M/UL (ref 4.7–6.1)
SODIUM BLD-SCNC: 140 MMOL/L (ref 136–145)
TOTAL PROTEIN: 7.5 G/DL (ref 6.6–8.7)
WBC # BLD: 4.4 K/UL (ref 4.8–10.8)

## 2018-08-02 PROCEDURE — 36415 COLL VENOUS BLD VENIPUNCTURE: CPT | Performed by: FAMILY MEDICINE

## 2018-08-10 RX ORDER — AMLODIPINE BESYLATE 10 MG/1
TABLET ORAL
Qty: 30 TABLET | Refills: 5 | Status: SHIPPED | OUTPATIENT
Start: 2018-08-10 | End: 2019-02-04 | Stop reason: SDUPTHER

## 2018-08-21 RX ORDER — CLONIDINE HYDROCHLORIDE 0.1 MG/1
TABLET ORAL
Qty: 60 TABLET | Refills: 3 | Status: SHIPPED | OUTPATIENT
Start: 2018-08-21 | End: 2019-01-14 | Stop reason: SDUPTHER

## 2018-08-21 RX ORDER — ALLOPURINOL 100 MG/1
TABLET ORAL
Qty: 60 TABLET | Refills: 3 | Status: SHIPPED | OUTPATIENT
Start: 2018-08-21 | End: 2018-12-19 | Stop reason: SDUPTHER

## 2018-09-24 ENCOUNTER — TELEPHONE (OUTPATIENT)
Dept: PRIMARY CARE CLINIC | Age: 78
End: 2018-09-24

## 2018-09-24 NOTE — TELEPHONE ENCOUNTER
Please tell him I really do not know a lot about it. They usually let the gastroenterologist do an EGD and see if they think it is needed.

## 2018-10-01 ENCOUNTER — NURSE ONLY (OUTPATIENT)
Dept: PRIMARY CARE CLINIC | Age: 78
End: 2018-10-01
Payer: MEDICARE

## 2018-10-01 VITALS — SYSTOLIC BLOOD PRESSURE: 132 MMHG | DIASTOLIC BLOOD PRESSURE: 82 MMHG

## 2018-10-01 DIAGNOSIS — D72.819 LEUKOPENIA, UNSPECIFIED TYPE: ICD-10-CM

## 2018-10-01 DIAGNOSIS — I10 ESSENTIAL HYPERTENSION: Primary | ICD-10-CM

## 2018-10-01 LAB
ALBUMIN SERPL-MCNC: 4.5 G/DL (ref 3.5–5.2)
ALP BLD-CCNC: 60 U/L (ref 40–130)
ALT SERPL-CCNC: 54 U/L (ref 5–41)
ANION GAP SERPL CALCULATED.3IONS-SCNC: 18 MMOL/L (ref 7–19)
AST SERPL-CCNC: 32 U/L (ref 5–40)
BILIRUB SERPL-MCNC: 0.4 MG/DL (ref 0.2–1.2)
BUN BLDV-MCNC: 20 MG/DL (ref 8–23)
CALCIUM SERPL-MCNC: 9.9 MG/DL (ref 8.8–10.2)
CHLORIDE BLD-SCNC: 99 MMOL/L (ref 98–111)
CO2: 23 MMOL/L (ref 22–29)
CREAT SERPL-MCNC: 1.2 MG/DL (ref 0.5–1.2)
GFR NON-AFRICAN AMERICAN: 58
GLUCOSE BLD-MCNC: 123 MG/DL (ref 74–109)
HCT VFR BLD CALC: 47 % (ref 42–52)
HEMOGLOBIN: 15.8 G/DL (ref 14–18)
MCH RBC QN AUTO: 32.4 PG (ref 27–31)
MCHC RBC AUTO-ENTMCNC: 33.6 G/DL (ref 33–37)
MCV RBC AUTO: 96.3 FL (ref 80–94)
PDW BLD-RTO: 13.1 % (ref 11.5–14.5)
PLATELET # BLD: 106 K/UL (ref 130–400)
PMV BLD AUTO: 12.6 FL (ref 9.4–12.4)
POTASSIUM SERPL-SCNC: 4.1 MMOL/L (ref 3.5–5)
RBC # BLD: 4.88 M/UL (ref 4.7–6.1)
SODIUM BLD-SCNC: 140 MMOL/L (ref 136–145)
TOTAL PROTEIN: 7.4 G/DL (ref 6.6–8.7)
WBC # BLD: 5.9 K/UL (ref 4.8–10.8)

## 2018-10-01 PROCEDURE — 36415 COLL VENOUS BLD VENIPUNCTURE: CPT | Performed by: FAMILY MEDICINE

## 2018-10-04 PROCEDURE — 88305 TISSUE EXAM BY PATHOLOGIST: CPT | Performed by: GENERAL PRACTICE

## 2018-10-05 ENCOUNTER — LAB REQUISITION (OUTPATIENT)
Dept: LAB | Facility: HOSPITAL | Age: 78
End: 2018-10-05

## 2018-10-05 DIAGNOSIS — Z00.00 ENCOUNTER FOR GENERAL ADULT MEDICAL EXAMINATION WITHOUT ABNORMAL FINDINGS: ICD-10-CM

## 2018-10-11 LAB
CYTO UR: NORMAL
LAB AP CASE REPORT: NORMAL
LAB AP CLINICAL INFORMATION: NORMAL
PATH REPORT.FINAL DX SPEC: NORMAL
PATH REPORT.GROSS SPEC: NORMAL

## 2018-12-21 RX ORDER — ALLOPURINOL 100 MG/1
TABLET ORAL
Qty: 60 TABLET | Refills: 3 | Status: SHIPPED | OUTPATIENT
Start: 2018-12-21 | End: 2019-09-10 | Stop reason: SDUPTHER

## 2019-01-03 ENCOUNTER — NURSE ONLY (OUTPATIENT)
Dept: PRIMARY CARE CLINIC | Age: 79
End: 2019-01-03
Payer: MEDICARE

## 2019-01-03 DIAGNOSIS — M79.671 FOOT PAIN, BILATERAL: Primary | ICD-10-CM

## 2019-01-03 DIAGNOSIS — E79.0 ELEVATED URIC ACID IN BLOOD: ICD-10-CM

## 2019-01-03 DIAGNOSIS — M79.672 FOOT PAIN, BILATERAL: Primary | ICD-10-CM

## 2019-01-03 LAB — URIC ACID, SERUM: 7.9 MG/DL (ref 3.4–7)

## 2019-01-03 PROCEDURE — 36415 COLL VENOUS BLD VENIPUNCTURE: CPT | Performed by: FAMILY MEDICINE

## 2019-01-14 RX ORDER — CLONIDINE HYDROCHLORIDE 0.1 MG/1
TABLET ORAL
Qty: 60 TABLET | Refills: 3 | Status: SHIPPED | OUTPATIENT
Start: 2019-01-14 | End: 2019-05-17 | Stop reason: SDUPTHER

## 2019-01-29 RX ORDER — INDAPAMIDE 1.25 MG/1
TABLET, FILM COATED ORAL
Qty: 30 TABLET | Refills: 5 | Status: SHIPPED | OUTPATIENT
Start: 2019-01-29 | End: 2019-07-25 | Stop reason: SDUPTHER

## 2019-02-05 RX ORDER — AMLODIPINE BESYLATE 10 MG/1
TABLET ORAL
Qty: 30 TABLET | Refills: 5 | Status: SHIPPED | OUTPATIENT
Start: 2019-02-05 | End: 2019-08-11 | Stop reason: SDUPTHER

## 2019-05-17 RX ORDER — CLONIDINE HYDROCHLORIDE 0.1 MG/1
TABLET ORAL
Qty: 60 TABLET | Refills: 3 | Status: SHIPPED | OUTPATIENT
Start: 2019-05-17 | End: 2021-04-06 | Stop reason: SDUPTHER

## 2019-06-17 RX ORDER — CLONIDINE HYDROCHLORIDE 0.1 MG/1
TABLET ORAL
Qty: 60 TABLET | Refills: 3 | Status: SHIPPED | OUTPATIENT
Start: 2019-06-17 | End: 2019-09-30 | Stop reason: SDUPTHER

## 2019-07-24 ENCOUNTER — TELEPHONE (OUTPATIENT)
Dept: PRIMARY CARE CLINIC | Age: 79
End: 2019-07-24

## 2019-07-25 RX ORDER — INDAPAMIDE 1.25 MG/1
TABLET, FILM COATED ORAL
Qty: 30 TABLET | Refills: 5 | Status: SHIPPED | OUTPATIENT
Start: 2019-07-25 | End: 2019-09-30 | Stop reason: SDUPTHER

## 2019-08-12 RX ORDER — AMLODIPINE BESYLATE 10 MG/1
TABLET ORAL
Qty: 30 TABLET | Refills: 5 | Status: SHIPPED | OUTPATIENT
Start: 2019-08-12 | End: 2019-09-30 | Stop reason: SDUPTHER

## 2019-09-06 ENCOUNTER — NURSE ONLY (OUTPATIENT)
Dept: PRIMARY CARE CLINIC | Age: 79
End: 2019-09-06
Payer: MEDICARE

## 2019-09-06 VITALS — DIASTOLIC BLOOD PRESSURE: 74 MMHG | SYSTOLIC BLOOD PRESSURE: 128 MMHG

## 2019-09-06 DIAGNOSIS — E79.0 ELEVATED URIC ACID IN BLOOD: ICD-10-CM

## 2019-09-06 DIAGNOSIS — I10 ESSENTIAL HYPERTENSION: Primary | ICD-10-CM

## 2019-09-06 DIAGNOSIS — D72.819 LEUKOPENIA, UNSPECIFIED TYPE: ICD-10-CM

## 2019-09-06 DIAGNOSIS — E78.2 MIXED HYPERLIPIDEMIA: ICD-10-CM

## 2019-09-06 DIAGNOSIS — Z12.5 SCREENING PSA (PROSTATE SPECIFIC ANTIGEN): ICD-10-CM

## 2019-09-06 LAB
ALBUMIN SERPL-MCNC: 4.4 G/DL (ref 3.5–5.2)
ALP BLD-CCNC: 59 U/L (ref 40–130)
ALT SERPL-CCNC: 39 U/L (ref 5–41)
ANION GAP SERPL CALCULATED.3IONS-SCNC: 12 MMOL/L (ref 7–19)
AST SERPL-CCNC: 32 U/L (ref 5–40)
BILIRUB SERPL-MCNC: 0.5 MG/DL (ref 0.2–1.2)
BUN BLDV-MCNC: 21 MG/DL (ref 8–23)
CALCIUM SERPL-MCNC: 9.6 MG/DL (ref 8.8–10.2)
CHLORIDE BLD-SCNC: 100 MMOL/L (ref 98–111)
CHOLESTEROL, TOTAL: 179 MG/DL (ref 160–199)
CO2: 25 MMOL/L (ref 22–29)
CREAT SERPL-MCNC: 1.2 MG/DL (ref 0.5–1.2)
GFR NON-AFRICAN AMERICAN: 58
GLUCOSE BLD-MCNC: 164 MG/DL (ref 74–109)
HCT VFR BLD CALC: 45.9 % (ref 42–52)
HDLC SERPL-MCNC: 41 MG/DL (ref 55–121)
HEMOGLOBIN: 15.2 G/DL (ref 14–18)
LDL CHOLESTEROL CALCULATED: 93 MG/DL
MCH RBC QN AUTO: 32.2 PG (ref 27–31)
MCHC RBC AUTO-ENTMCNC: 33.1 G/DL (ref 33–37)
MCV RBC AUTO: 97.2 FL (ref 80–94)
PDW BLD-RTO: 13.1 % (ref 11.5–14.5)
PLATELET # BLD: 117 K/UL (ref 130–400)
PMV BLD AUTO: 13 FL (ref 9.4–12.4)
POTASSIUM SERPL-SCNC: 4.2 MMOL/L (ref 3.5–5)
PROSTATE SPECIFIC ANTIGEN: 1.28 NG/ML (ref 0–4)
RBC # BLD: 4.72 M/UL (ref 4.7–6.1)
SODIUM BLD-SCNC: 137 MMOL/L (ref 136–145)
TOTAL PROTEIN: 7.6 G/DL (ref 6.6–8.7)
TRIGL SERPL-MCNC: 225 MG/DL (ref 0–149)
URIC ACID, SERUM: 7.5 MG/DL (ref 3.4–7)
WBC # BLD: 5.7 K/UL (ref 4.8–10.8)

## 2019-09-06 PROCEDURE — 36415 COLL VENOUS BLD VENIPUNCTURE: CPT | Performed by: FAMILY MEDICINE

## 2019-09-10 ENCOUNTER — OFFICE VISIT (OUTPATIENT)
Dept: PRIMARY CARE CLINIC | Age: 79
End: 2019-09-10
Payer: MEDICARE

## 2019-09-10 VITALS
OXYGEN SATURATION: 94 % | BODY MASS INDEX: 35.11 KG/M2 | TEMPERATURE: 98.3 F | HEART RATE: 82 BPM | RESPIRATION RATE: 18 BRPM | SYSTOLIC BLOOD PRESSURE: 135 MMHG | DIASTOLIC BLOOD PRESSURE: 80 MMHG | WEIGHT: 250.8 LBS | HEIGHT: 71 IN

## 2019-09-10 DIAGNOSIS — E11.9 TYPE 2 DIABETES MELLITUS WITHOUT COMPLICATION, WITHOUT LONG-TERM CURRENT USE OF INSULIN (HCC): ICD-10-CM

## 2019-09-10 DIAGNOSIS — E78.2 MIXED HYPERLIPIDEMIA: ICD-10-CM

## 2019-09-10 DIAGNOSIS — M70.22 OLECRANON BURSITIS OF LEFT ELBOW: Primary | ICD-10-CM

## 2019-09-10 PROCEDURE — 4040F PNEUMOC VAC/ADMIN/RCVD: CPT | Performed by: FAMILY MEDICINE

## 2019-09-10 PROCEDURE — G8598 ASA/ANTIPLAT THER USED: HCPCS | Performed by: FAMILY MEDICINE

## 2019-09-10 PROCEDURE — 1036F TOBACCO NON-USER: CPT | Performed by: FAMILY MEDICINE

## 2019-09-10 PROCEDURE — G8427 DOCREV CUR MEDS BY ELIG CLIN: HCPCS | Performed by: FAMILY MEDICINE

## 2019-09-10 PROCEDURE — 99214 OFFICE O/P EST MOD 30 MIN: CPT | Performed by: FAMILY MEDICINE

## 2019-09-10 PROCEDURE — G8417 CALC BMI ABV UP PARAM F/U: HCPCS | Performed by: FAMILY MEDICINE

## 2019-09-10 PROCEDURE — 1123F ACP DISCUSS/DSCN MKR DOCD: CPT | Performed by: FAMILY MEDICINE

## 2019-09-10 RX ORDER — ALLOPURINOL 100 MG/1
100 TABLET ORAL 2 TIMES DAILY
Qty: 60 TABLET | Refills: 3 | Status: SHIPPED | OUTPATIENT
Start: 2019-09-10 | End: 2019-09-30 | Stop reason: SDUPTHER

## 2019-09-10 ASSESSMENT — PATIENT HEALTH QUESTIONNAIRE - PHQ9
1. LITTLE INTEREST OR PLEASURE IN DOING THINGS: 0
SUM OF ALL RESPONSES TO PHQ QUESTIONS 1-9: 0
SUM OF ALL RESPONSES TO PHQ9 QUESTIONS 1 & 2: 0
SUM OF ALL RESPONSES TO PHQ QUESTIONS 1-9: 0
2. FEELING DOWN, DEPRESSED OR HOPELESS: 0

## 2019-09-10 ASSESSMENT — ENCOUNTER SYMPTOMS
DIARRHEA: 0
RESPIRATORY NEGATIVE: 1

## 2019-09-10 NOTE — PATIENT INSTRUCTIONS
Patient Education        Learning About Type 2 Diabetes  What is type 2 diabetes? Insulin is a hormone that helps your body use sugar from your food as energy. Type 2 diabetes happens when your body can't use insulin the right way. Over time, the pancreas can't make enough insulin. If you don't have enough insulin, too much sugar stays in your blood. If you are overweight, get little or no exercise, or have type 2 diabetes in your family, you are more likely to have problems with the way insulin works in your body.  Americans, Hispanics, Native Americans,  Americans, and Pacific Islanders have a higher risk for type 2 diabetes. Type 2 diabetes can be prevented or delayed with a healthy lifestyle, which includes staying at a healthy weight, making smart food choices, and getting regular exercise. What can you expect with type 2 diabetes? Olga Lidia Vogt keep hearing about how important it is to keep your blood sugar within a target range. That's because over time, high blood sugar can lead to serious problems. It can:  · Harm your eyes, nerves, and kidneys. · Damage your blood vessels, leading to heart disease and stroke. · Reduce blood flow and cause nerve damage to parts of your body, especially your feet. This can cause slow healing and pain when you walk. · Make your immune system weak and less able to fight infections. When people hear the word \"diabetes,\" they often think of problems like these. But daily care and treatment can help prevent or delay these problems. The goal is to keep your blood sugar in a target range. That's the best way to reduce your chance of having more problems from diabetes. What are the symptoms? Some people who have type 2 diabetes may not have any symptoms early on. Many people with the disease don't even know they have it at first. But with time, diabetes starts to cause symptoms.  You experience most symptoms of type 2 diabetes when your blood sugar is either too any warranty or liability for your use of this information. Patient Education        Elbow Bursitis: Exercises  Introduction  Here are some examples of exercises for you to try. The exercises may be suggested for a condition or for rehabilitation. Start each exercise slowly. Ease off the exercises if you start to have pain. You will be told when to start these exercises and which ones will work best for you. How to do the exercises  Elbow flexion stretch    1. Lift the arm that bothers you, and bend the elbow. Your palm should face toward you. 2. With your other hand, gently push on the back of your affected forearm. Press your hand toward your shoulder until you feel a stretch in the back of your upper arm. 3. Hold for at least 15 to 30 seconds. 4. Repeat 2 to 4 times. Elbow extension stretch    1. Extend your affected arm in front of you with your palm facing away from you. 2. Bend back your wrist, pointing your hand up toward the ceiling. 3. With your other hand, gently bend your wrist farther until you feel a mild to moderate stretch in your forearm. 4. Hold for at least 15 to 30 seconds. 5. Repeat 2 to 4 times. 6. Repeat steps 1 through 5. But this time extend your affected arm in front of you with your palm facing up. Then bend back your wrist, pointing your hand toward the floor. Pronation and supination stretch    1. Keep your affected elbow at your side, bent at about 90 degrees. Grasp a pen, pencil, or stick, and wrap your hand around it. If you don't have something to hold on to, make a fist instead. 2. Slowly turn your forearm as far as you can back and forth in each direction. Your hand should face up and then down. 3. Hold each position for about 6 seconds. 4. Relax for up to 10 seconds between repetitions. 5. Repeat 8 to 12 times. Hand flips    1. While seated, place your affected forearm on your thigh. Your palm should face down.   2. Flip your hand over so the back of

## 2019-09-11 DIAGNOSIS — R73.09 ELEVATED GLUCOSE: Primary | ICD-10-CM

## 2019-09-11 LAB — HBA1C MFR BLD: 6.2 % (ref 4–6)

## 2019-09-11 PROCEDURE — 36415 COLL VENOUS BLD VENIPUNCTURE: CPT | Performed by: FAMILY MEDICINE

## 2019-09-11 NOTE — PROGRESS NOTES
Subjective:      Patient ID: Tin Lovell is a [de-identified] y.o. male who comes in today complaining that his left elbow with swelling. HPI it actually started 6-8 weeks ago but it's getting better because he looked it up on the Internet and has been doing exercises. He denies injury. He also had lab work done and wants to go over it. His uric acid was 7.5. He did have a flareup of gout he wants to know if he needs to be on allopurinol. PSA is 1.28. Triglycerides 225 with an HDL of 41. LDL is 93. He is not anemic the platelets are low at 117,000. Glucose was elevated at 164. GFR was 58. He thinks that his grandmother was a diabetic but he's never had any problems with it. Review of Systems   Constitutional: Positive for activity change. Negative for fatigue. Eyes: Negative for visual disturbance. Respiratory: Negative. Cardiovascular: Negative. Gastrointestinal: Negative for diarrhea. Endocrine: Negative for polydipsia, polyphagia and polyuria. Genitourinary: Negative for dysuria. Musculoskeletal: Positive for arthralgias and joint swelling. Neurological: Negative for weakness and numbness. Hematological: Negative. Psychiatric/Behavioral: Negative. Objective:   Physical Exam   Constitutional: He is oriented to person, place, and time. He appears well-developed and well-nourished. No distress. HENT:   Head: Normocephalic. Right Ear: External ear normal.   Left Ear: External ear normal.   Mouth/Throat: Oropharynx is clear and moist.   Eyes: Pupils are equal, round, and reactive to light. Conjunctivae are normal.   Neck: Normal range of motion. Neck supple. No JVD present. No thyromegaly present. Cardiovascular: Normal rate, regular rhythm, normal heart sounds and intact distal pulses. Pulmonary/Chest: Effort normal and breath sounds normal. No respiratory distress. Abdominal: Soft. Bowel sounds are normal. There is no tenderness. Musculoskeletal: Normal range of motion.  He exhibits no edema. Elbow is normal now   Lymphadenopathy:     He has no cervical adenopathy. Neurological: He is alert and oriented to person, place, and time. Sensory exam of the foot is normal, tested with the monofilament. Good pulses, no lesions or ulcers, good peripheral pulses. Skin: Skin is warm and dry. Psychiatric: He has a normal mood and affect. His behavior is normal. Judgment and thought content normal.   Vitals reviewed. Assessment:      1. Olecranon bursitis of left elbow    2. Mixed hyperlipidemia    3. Type 2 diabetes mellitus without complication, without long-term current use of insulin ([facility] [address]            Plan:      MEDICATIONS:  No orders of the defined types were placed in this encounter. We are going to add a hemoglobin A1c. We probably do need to begin allopurinol but I will wait until we get the hemoglobin A1c. ORDERS:  Orders Placed This Encounter   Procedures    External Referral To Diabetic Education    HM DIABETES FOOT EXAM     He does not meet the criteria for diabetic shoes. I'm going to refer him to the diabetic educator at the end of September beginning of October once he returns from his cruise. I will also start him on metformin at that time. The bursitis of his left elbow is better. Exercises were given. I did review all of his labs that were done on September 6, 2019. Glucose was elevated at 164 with a hemoglobin A1c of 7. Follow-up:  Return in about 3 months (around 12/10/2019) for Diabetes recheck with me. Ly Schmidt MD    EMR Dragon/transcription disclaimer:  Muchof this encounter note is electronic transcription/translation of spoken language to printed texts. The electronic translation of spoken language may be erroneous, or at times, nonsensical words or phrases may beinadvertently transcribed.   Although I have reviewed the note for such errors, some may still exist.

## 2019-09-27 ENCOUNTER — NURSE ONLY (OUTPATIENT)
Dept: PRIMARY CARE CLINIC | Age: 79
End: 2019-09-27
Payer: MEDICARE

## 2019-09-27 DIAGNOSIS — D72.819 LEUKOPENIA, UNSPECIFIED TYPE: Primary | ICD-10-CM

## 2019-09-27 LAB
BASOPHILS ABSOLUTE: 0 K/UL (ref 0–0.2)
BASOPHILS RELATIVE PERCENT: 0.5 % (ref 0–1)
EOSINOPHILS ABSOLUTE: 0.2 K/UL (ref 0–0.6)
EOSINOPHILS RELATIVE PERCENT: 2.8 % (ref 0–5)
HCT VFR BLD CALC: 47.7 % (ref 42–52)
HEMOGLOBIN: 15.6 G/DL (ref 14–18)
IMMATURE GRANULOCYTES #: 0 K/UL
LYMPHOCYTES ABSOLUTE: 1.9 K/UL (ref 1.1–4.5)
LYMPHOCYTES RELATIVE PERCENT: 31.1 % (ref 20–40)
MCH RBC QN AUTO: 32.4 PG (ref 27–31)
MCHC RBC AUTO-ENTMCNC: 32.7 G/DL (ref 33–37)
MCV RBC AUTO: 99.2 FL (ref 80–94)
MONOCYTES ABSOLUTE: 0.5 K/UL (ref 0–0.9)
MONOCYTES RELATIVE PERCENT: 7.7 % (ref 0–10)
NEUTROPHILS ABSOLUTE: 3.5 K/UL (ref 1.5–7.5)
NEUTROPHILS RELATIVE PERCENT: 57.6 % (ref 50–65)
PDW BLD-RTO: 13.3 % (ref 11.5–14.5)
PLATELET # BLD: 118 K/UL (ref 130–400)
PMV BLD AUTO: 12.3 FL (ref 9.4–12.4)
RBC # BLD: 4.81 M/UL (ref 4.7–6.1)
WBC # BLD: 6 K/UL (ref 4.8–10.8)

## 2019-09-27 PROCEDURE — 36415 COLL VENOUS BLD VENIPUNCTURE: CPT | Performed by: FAMILY MEDICINE

## 2019-09-30 RX ORDER — INDAPAMIDE 1.25 MG/1
1.25 TABLET, FILM COATED ORAL DAILY
Qty: 90 TABLET | Refills: 3 | Status: SHIPPED | OUTPATIENT
Start: 2019-09-30 | End: 2020-08-06

## 2019-09-30 RX ORDER — AMLODIPINE BESYLATE 10 MG/1
10 TABLET ORAL NIGHTLY
Qty: 90 TABLET | Refills: 3 | Status: SHIPPED | OUTPATIENT
Start: 2019-09-30 | End: 2020-08-06

## 2019-09-30 RX ORDER — ALLOPURINOL 100 MG/1
100 TABLET ORAL 2 TIMES DAILY
Qty: 180 TABLET | Refills: 3 | Status: SHIPPED | OUTPATIENT
Start: 2019-09-30 | End: 2019-10-01 | Stop reason: SDUPTHER

## 2019-09-30 RX ORDER — CLONIDINE HYDROCHLORIDE 0.1 MG/1
0.1 TABLET ORAL 2 TIMES DAILY
Qty: 180 TABLET | Refills: 3 | Status: SHIPPED | OUTPATIENT
Start: 2019-09-30 | End: 2020-08-06

## 2019-09-30 RX ORDER — FUROSEMIDE 20 MG/1
TABLET ORAL
Qty: 90 TABLET | Refills: 3 | Status: SHIPPED | OUTPATIENT
Start: 2019-09-30 | End: 2019-10-02 | Stop reason: SDUPTHER

## 2019-10-01 RX ORDER — ALLOPURINOL 100 MG/1
100 TABLET ORAL 2 TIMES DAILY
Qty: 60 TABLET | Refills: 0 | Status: SHIPPED | OUTPATIENT
Start: 2019-10-01 | End: 2019-10-07 | Stop reason: SDUPTHER

## 2019-10-02 RX ORDER — FUROSEMIDE 20 MG/1
TABLET ORAL
Qty: 90 TABLET | Refills: 3 | Status: SHIPPED | OUTPATIENT
Start: 2019-10-02 | End: 2020-08-06

## 2019-10-07 RX ORDER — ALLOPURINOL 100 MG/1
100 TABLET ORAL 2 TIMES DAILY
Qty: 180 TABLET | Refills: 3 | Status: SHIPPED | OUTPATIENT
Start: 2019-10-07 | End: 2021-03-02

## 2019-10-31 ENCOUNTER — TELEPHONE (OUTPATIENT)
Dept: PRIMARY CARE CLINIC | Age: 79
End: 2019-10-31

## 2019-12-18 ENCOUNTER — OFFICE VISIT (OUTPATIENT)
Dept: PRIMARY CARE CLINIC | Age: 79
End: 2019-12-18
Payer: MEDICARE

## 2019-12-18 VITALS
WEIGHT: 256 LBS | RESPIRATION RATE: 18 BRPM | TEMPERATURE: 97.7 F | HEIGHT: 70 IN | BODY MASS INDEX: 36.65 KG/M2 | OXYGEN SATURATION: 96 % | HEART RATE: 78 BPM | SYSTOLIC BLOOD PRESSURE: 121 MMHG | DIASTOLIC BLOOD PRESSURE: 80 MMHG

## 2019-12-18 DIAGNOSIS — E11.9 TYPE 2 DIABETES MELLITUS WITHOUT COMPLICATION, WITHOUT LONG-TERM CURRENT USE OF INSULIN (HCC): Primary | ICD-10-CM

## 2019-12-18 LAB
ANION GAP SERPL CALCULATED.3IONS-SCNC: 14 MMOL/L (ref 7–19)
BUN BLDV-MCNC: 23 MG/DL (ref 8–23)
CALCIUM SERPL-MCNC: 9.9 MG/DL (ref 8.8–10.2)
CHLORIDE BLD-SCNC: 96 MMOL/L (ref 98–111)
CO2: 28 MMOL/L (ref 22–29)
CREAT SERPL-MCNC: 1.5 MG/DL (ref 0.5–1.2)
GFR NON-AFRICAN AMERICAN: 45
GLUCOSE BLD-MCNC: 147 MG/DL (ref 74–109)
HBA1C MFR BLD: 7 % (ref 4–6)
POTASSIUM SERPL-SCNC: 4.2 MMOL/L (ref 3.5–5)
SODIUM BLD-SCNC: 138 MMOL/L (ref 136–145)

## 2019-12-18 PROCEDURE — 1036F TOBACCO NON-USER: CPT | Performed by: FAMILY MEDICINE

## 2019-12-18 PROCEDURE — 36415 COLL VENOUS BLD VENIPUNCTURE: CPT | Performed by: FAMILY MEDICINE

## 2019-12-18 PROCEDURE — G8417 CALC BMI ABV UP PARAM F/U: HCPCS | Performed by: FAMILY MEDICINE

## 2019-12-18 PROCEDURE — G8483 FLU IMM NO ADMIN DOC REA: HCPCS | Performed by: FAMILY MEDICINE

## 2019-12-18 PROCEDURE — 4040F PNEUMOC VAC/ADMIN/RCVD: CPT | Performed by: FAMILY MEDICINE

## 2019-12-18 PROCEDURE — 99213 OFFICE O/P EST LOW 20 MIN: CPT | Performed by: FAMILY MEDICINE

## 2019-12-18 PROCEDURE — G8598 ASA/ANTIPLAT THER USED: HCPCS | Performed by: FAMILY MEDICINE

## 2019-12-18 PROCEDURE — G8427 DOCREV CUR MEDS BY ELIG CLIN: HCPCS | Performed by: FAMILY MEDICINE

## 2019-12-18 PROCEDURE — 1123F ACP DISCUSS/DSCN MKR DOCD: CPT | Performed by: FAMILY MEDICINE

## 2019-12-22 ASSESSMENT — ENCOUNTER SYMPTOMS
DIARRHEA: 0
RESPIRATORY NEGATIVE: 1

## 2020-06-15 NOTE — TELEPHONE ENCOUNTER
Med sent
Pt is requesting a 90 day with refill's for  Marino Ibanez sent to [facility] order plesae
c/o l lower tooth pain x 2 days used oragel and ibuprofen with some relief didn't take today

## 2020-08-06 RX ORDER — AMLODIPINE BESYLATE 10 MG/1
TABLET ORAL
Qty: 90 TABLET | Refills: 3 | Status: SHIPPED | OUTPATIENT
Start: 2020-08-06 | End: 2021-03-01 | Stop reason: SDUPTHER

## 2020-08-06 RX ORDER — FUROSEMIDE 20 MG/1
TABLET ORAL
Qty: 90 TABLET | Refills: 3 | Status: SHIPPED | OUTPATIENT
Start: 2020-08-06 | End: 2021-05-27

## 2020-08-06 RX ORDER — CLONIDINE HYDROCHLORIDE 0.1 MG/1
TABLET ORAL
Qty: 180 TABLET | Refills: 3 | Status: SHIPPED | OUTPATIENT
Start: 2020-08-06 | End: 2021-03-01 | Stop reason: SDUPTHER

## 2020-08-06 RX ORDER — INDAPAMIDE 1.25 MG/1
TABLET, FILM COATED ORAL
Qty: 90 TABLET | Refills: 3 | Status: SHIPPED | OUTPATIENT
Start: 2020-08-06 | End: 2021-06-28

## 2020-08-19 ENCOUNTER — TELEPHONE (OUTPATIENT)
Dept: ADMINISTRATIVE | Age: 80
End: 2020-08-19

## 2020-08-19 NOTE — TELEPHONE ENCOUNTER
Nitin Marcelo requests that a nurse return their call. Pt calling to have labs drawn and there are no orders in the system for him The best time to reach him is Anytime. Thank you.

## 2020-08-24 ENCOUNTER — OFFICE VISIT (OUTPATIENT)
Dept: PRIMARY CARE CLINIC | Age: 80
End: 2020-08-24
Payer: MEDICARE

## 2020-08-24 VITALS
WEIGHT: 246.4 LBS | SYSTOLIC BLOOD PRESSURE: 150 MMHG | HEART RATE: 74 BPM | HEIGHT: 71 IN | DIASTOLIC BLOOD PRESSURE: 90 MMHG | TEMPERATURE: 97.6 F | OXYGEN SATURATION: 94 % | BODY MASS INDEX: 34.5 KG/M2 | RESPIRATION RATE: 18 BRPM

## 2020-08-24 LAB
ALBUMIN SERPL-MCNC: 4.7 G/DL (ref 3.5–5.2)
ALP BLD-CCNC: 56 U/L (ref 40–130)
ALT SERPL-CCNC: 26 U/L (ref 5–41)
ANION GAP SERPL CALCULATED.3IONS-SCNC: 17 MMOL/L (ref 7–19)
AST SERPL-CCNC: 24 U/L (ref 5–40)
BILIRUB SERPL-MCNC: 0.5 MG/DL (ref 0.2–1.2)
BUN BLDV-MCNC: 18 MG/DL (ref 8–23)
CALCIUM SERPL-MCNC: 9.9 MG/DL (ref 8.8–10.2)
CHLORIDE BLD-SCNC: 97 MMOL/L (ref 98–111)
CO2: 22 MMOL/L (ref 22–29)
CREAT SERPL-MCNC: 1.2 MG/DL (ref 0.5–1.2)
GFR AFRICAN AMERICAN: >59
GFR NON-AFRICAN AMERICAN: 58
GLUCOSE BLD-MCNC: 95 MG/DL (ref 74–109)
HBA1C MFR BLD: 5.7 % (ref 4–6)
HCT VFR BLD CALC: 47.8 % (ref 42–52)
HEMOGLOBIN: 15.9 G/DL (ref 14–18)
MCH RBC QN AUTO: 32.6 PG (ref 27–31)
MCHC RBC AUTO-ENTMCNC: 33.3 G/DL (ref 33–37)
MCV RBC AUTO: 98 FL (ref 80–94)
PDW BLD-RTO: 13 % (ref 11.5–14.5)
PLATELET # BLD: 115 K/UL (ref 130–400)
PMV BLD AUTO: 12.9 FL (ref 9.4–12.4)
POTASSIUM SERPL-SCNC: 4.2 MMOL/L (ref 3.5–5)
RBC # BLD: 4.88 M/UL (ref 4.7–6.1)
SODIUM BLD-SCNC: 136 MMOL/L (ref 136–145)
TOTAL PROTEIN: 7.8 G/DL (ref 6.6–8.7)
WBC # BLD: 6.7 K/UL (ref 4.8–10.8)

## 2020-08-24 PROCEDURE — 36415 COLL VENOUS BLD VENIPUNCTURE: CPT | Performed by: FAMILY MEDICINE

## 2020-08-24 PROCEDURE — 4040F PNEUMOC VAC/ADMIN/RCVD: CPT | Performed by: FAMILY MEDICINE

## 2020-08-24 PROCEDURE — 99213 OFFICE O/P EST LOW 20 MIN: CPT | Performed by: FAMILY MEDICINE

## 2020-08-24 PROCEDURE — 1123F ACP DISCUSS/DSCN MKR DOCD: CPT | Performed by: FAMILY MEDICINE

## 2020-08-24 PROCEDURE — 4004F PT TOBACCO SCREEN RCVD TLK: CPT | Performed by: FAMILY MEDICINE

## 2020-08-24 PROCEDURE — G8417 CALC BMI ABV UP PARAM F/U: HCPCS | Performed by: FAMILY MEDICINE

## 2020-08-24 PROCEDURE — G8427 DOCREV CUR MEDS BY ELIG CLIN: HCPCS | Performed by: FAMILY MEDICINE

## 2020-08-24 ASSESSMENT — PATIENT HEALTH QUESTIONNAIRE - PHQ9
2. FEELING DOWN, DEPRESSED OR HOPELESS: 0
1. LITTLE INTEREST OR PLEASURE IN DOING THINGS: 0
SUM OF ALL RESPONSES TO PHQ QUESTIONS 1-9: 0
SUM OF ALL RESPONSES TO PHQ9 QUESTIONS 1 & 2: 0
SUM OF ALL RESPONSES TO PHQ QUESTIONS 1-9: 0

## 2020-08-24 NOTE — LETTER
[facility] [address] [address]  Phone: [phone]  Fax: [phone]    Hugo Harrington MD        August 28, 2020     Savanna Romero [address]      Dear Valery Doll:    Below are the results from your recent visit:    Resulted Orders   Comprehensive Metabolic Panel   Result Value Ref Range    Sodium 136 136 - 145 mmol/L    Potassium 4.2 3.5 - 5.0 mmol/L    Chloride 97 (L) 98 - 111 mmol/L    CO2 22 22 - 29 mmol/L    Anion Gap 17 7 - 19 mmol/L    Glucose 95 74 - 109 mg/dL    BUN 18 8 - 23 mg/dL    CREATININE 1.2 0.5 - 1.2 mg/dL    GFR Non-African American 58 (A) >60      Comment: This calculation may be inaccurate for patients under the age of 25 years. For ages 25 and older, a GFR >60 mL/min/1.73m2 (not corrected for weight) is  valid for stable renal function. GFR  >59 >59      Comment:      Chronic Kidney Disease: less than 60 ml/min/1.73 sq.m. Kidney Failure: less than 15 ml/min/1.73 sq.m. Results valid for patients 18 years and older. Calcium 9.9 8.8 - 10.2 mg/dL    Total Protein 7.8 6.6 - 8.7 g/dL    Alb 4.7 3.5 - 5.2 g/dL    Total Bilirubin 0.5 0.2 - 1.2 mg/dL    Alkaline Phosphatase 56 40 - 130 U/L    ALT 26 5 - 41 U/L    AST 24 5 - 40 U/L   CBC   Result Value Ref Range    WBC 6.7 4.8 - 10.8 K/uL    RBC 4.88 4.70 - 6.10 M/uL    Hemoglobin 15.9 14.0 - 18.0 g/dL    Hematocrit 47.8 42.0 - 52.0 %    MCV 98.0 (H) 80.0 - 94.0 fL    MCH 32.6 (H) 27.0 - 31.0 pg    MCHC 33.3 33.0 - 37.0 g/dL    RDW 13.0 11.5 - 14.5 %    Platelets 956 (L) 228 - 400 K/uL    MPV 12.9 (H) 9.4 - 12.4 fL   Hemoglobin A1C   Result Value Ref Range    Hemoglobin A1C 5.7 4.0 - 6.0 %      Comment:      HbA1c levels >6% are an indication of hyperglycemia during the preceding 2  to 3 months or longer. HbA1c levels may reach 20% or higher in poorly controlled diabetes. Therapeutic action is suggested at levels above 8%. Diabetes patients with HbA1c levels below 7% meet the goal of the American  Diabetes Association. HbA1c levels below the established reference range may indicate recent  episodes of hypoglycemia, the presence of Hb variants, or shortened lifetime  of erythrocytes.        HBA1c is normal,   Chloride and GFR is slightly decreased but better than it was 8 months ago   No anemia, platelets decreased but stable -- recheck labs in 6 months     If you have any questions or concerns, please don't hesitate to call.     Sincerely,        Nakia Patiño MD   [facility], CMA

## 2020-08-30 ASSESSMENT — ENCOUNTER SYMPTOMS
DIARRHEA: 0
RESPIRATORY NEGATIVE: 1

## 2020-08-31 NOTE — PROGRESS NOTES
SUBJECTIVE:    Koby Alex is 79 y.o.male who comes in complaining of Diabetes   . HPI: Mr. Arnie Berger comes in today for recheck of his diabetes. He is doing very well. He denies any episodes of hypoglycemia. Glucoses are ranging in the 92-120s range. Overall he feels good. He denies any chest pain or shortness of breath. He did have a decreased white blood cell count and we need to follow-up with that today also. Blood pressure is borderline but he says that it is okay at home. No Known Allergies    Social History     Socioeconomic History    Marital status:      Spouse name: Not on file    Number of children: 3    Years of education: Not on file    Highest education level: Not on file   Occupational History     Employer: [employer] Financial resource strain: Not on file    Food insecurity     Worry: Not on file     Inability: Not on file   [employer] needs     Medical: Not on file     Non-medical: Not on file   Tobacco Use    Smoking status: Never Smoker    Smokeless tobacco: Never Used   Substance and Sexual Activity    Alcohol use: No    Drug use: No    Sexual activity: Not on file   Lifestyle    Physical activity     Days per week: Not on file     Minutes per session: Not on file    Stress: Not on file   Relationships    Social connections     Talks on phone: Not on file     Gets together: Not on file     Attends Yarsanism service: Not on file     Active member of club or organization: Not on file     Attends meetings of clubs or organizations: Not on file     Relationship status: Not on file    Intimate partner violence     Fear of current or ex partner: Not on file     Emotionally abused: Not on file     Physically abused: Not on file     Forced sexual activity: Not on file   Other Topics Concern    Not on file   Social History Narrative    Not on file       Review of Systems   Constitutional: Negative for activity change and fatigue.    Eyes: Negative for visual disturbance. Respiratory: Negative. Cardiovascular: Negative. Gastrointestinal: Negative for diarrhea. Endocrine: Negative for polydipsia, polyphagia and polyuria. Genitourinary: Negative for dysuria. Musculoskeletal: Positive for arthralgias. Neurological: Negative for weakness and numbness. Hematological: Negative. Psychiatric/Behavioral: Negative. OBJECTIVE:    Wt Readings from Last 3 Encounters:   08/24/20 246 lb 6.4 oz (111.8 kg)   12/18/19 256 lb (116.1 kg)   09/10/19 250 lb 12.8 oz (113.8 kg)       BP (!) 150/90   Pulse 74   Temp 97.6 °F (36.4 °C)   Resp 18   Ht 5' 11\" (1.803 m)   Wt 246 lb 6.4 oz (111.8 kg)   SpO2 94%   BMI 34.37 kg/m²     Physical Exam  Vitals signs and nursing note reviewed. Constitutional:       General: He is not in acute distress. Appearance: Normal appearance. He is well-developed. He is not ill-appearing. HENT:      Head: Normocephalic. Right Ear: Tympanic membrane, ear canal and external ear normal.      Left Ear: Tympanic membrane, ear canal and external ear normal.   Eyes:      Extraocular Movements: Extraocular movements intact. Conjunctiva/sclera: Conjunctivae normal.      Pupils: Pupils are equal, round, and reactive to light. Neck:      Musculoskeletal: Normal range of motion and neck supple. Cardiovascular:      Rate and Rhythm: Normal rate and regular rhythm. Pulses: Normal pulses. Heart sounds: Normal heart sounds. Pulmonary:      Effort: Pulmonary effort is normal.      Breath sounds: Normal breath sounds. Lymphadenopathy:      Cervical: No cervical adenopathy. Skin:     General: Skin is warm and dry. Neurological:      General: No focal deficit present. Mental Status: He is alert and oriented to person, place, and time. Psychiatric:         Mood and Affect: Mood normal.         Behavior: Behavior normal.         Thought Content:  Thought content normal.         Judgment: Judgment normal.         ASSESSMENT:    1. Type 2 diabetes mellitus without complication, without long-term current use of insulin (HCC)    2. Leukopenia, unspecified type    3. Essential hypertension          PLAN:    MEDICATIONS:  No orders of the defined types were placed in this encounter. Continue current medications. We will refill when needed. ORDERS:  Orders Placed This Encounter   Procedures    Comprehensive Metabolic Panel    CBC    Hemoglobin A1C     We will contact him when we get results of his blood work. If he has any problems he is to let me know. Overall he seems to be doing very well. Follow-up:  Return in about 6 months (around 2/24/2021) for Checkup and fasting blood work. PATIENT INSTRUCTIONS:  Patient Instructions   We are committed to providing you with the best care possible. In order to help us achieve these goals please remember to bring all medications, herbal products, and over the counter supplements with you to each visit. If your provider has ordered testing for you, please be sure to follow up with our office if you have not received results within 7 days after the testing took place. *If you receive a survey after visiting one of our offices, please take time to share your experience concerning your physician office visit. These surveys are confidential and no health information about you is shared. We are eager to improve for you and we are counting on your feedback to help make that happen. EMR Dragon/transcription disclaimer:  Much of this encounter note is electronic transcription/translation of spoken language to printed texts. The electronic translation of spoken language may be erroneous, or at times, nonsensical words or phrases may beinadvertently transcribed.   Although I have reviewed the note for such errors, some may still exist.

## 2020-09-09 ENCOUNTER — VIRTUAL VISIT (OUTPATIENT)
Dept: PRIMARY CARE CLINIC | Age: 80
End: 2020-09-09
Payer: MEDICARE

## 2020-09-09 VITALS
WEIGHT: 240 LBS | SYSTOLIC BLOOD PRESSURE: 138 MMHG | DIASTOLIC BLOOD PRESSURE: 78 MMHG | HEIGHT: 71 IN | BODY MASS INDEX: 33.6 KG/M2

## 2020-09-09 PROCEDURE — 4040F PNEUMOC VAC/ADMIN/RCVD: CPT | Performed by: FAMILY MEDICINE

## 2020-09-09 PROCEDURE — 1123F ACP DISCUSS/DSCN MKR DOCD: CPT | Performed by: FAMILY MEDICINE

## 2020-09-09 PROCEDURE — G0438 PPPS, INITIAL VISIT: HCPCS | Performed by: FAMILY MEDICINE

## 2020-09-09 ASSESSMENT — PATIENT HEALTH QUESTIONNAIRE - PHQ9
SUM OF ALL RESPONSES TO PHQ QUESTIONS 1-9: 0
SUM OF ALL RESPONSES TO PHQ9 QUESTIONS 1 & 2: 0
2. FEELING DOWN, DEPRESSED OR HOPELESS: 0
1. LITTLE INTEREST OR PLEASURE IN DOING THINGS: 0
SUM OF ALL RESPONSES TO PHQ QUESTIONS 1-9: 0

## 2020-09-09 ASSESSMENT — LIFESTYLE VARIABLES: HOW OFTEN DO YOU HAVE A DRINK CONTAINING ALCOHOL: 0

## 2020-09-09 NOTE — PROGRESS NOTES
Medicare Annual Wellness Visit  Name: Areli Montero Date: 2020   MRN: [id number] Sex: Male   Age: 79 y.o. Ethnicity: Non-/Non    : 1940 Race: AnnelncAlexis Kauffman is here for Medicare AWV    Screenings for behavioral, psychosocial and functional/safety risks, and cognitive dysfunction are all negative except as indicated below. These results, as well as other patient data from the [address] form, are documented in Flowsheets linked to this Encounter. No Known Allergies    Prior to Visit Medications    Medication Sig Taking? Authorizing Provider   amLODIPine (NORVASC) 10 MG tablet TAKE 1 TABLET EVERY NIGHT Yes Car Mcrae MD   indapamide (LOZOL) 1.25 MG tablet TAKE 1 TABLET EVERY DAY Yes Car Mcrae MD   cloNIDine (CATAPRES) 0.1 MG tablet TAKE 1 TABLET TWICE DAILY Yes Car Mcrae MD   furosemide (LASIX) 20 MG tablet TAKE 1 TABLET EVERY DAY Yes Car Mcrae MD   SITagliptin (JANUVIA) 100 MG tablet 1 tablet by mouth once a day for diabetes Yes Car Mcrae MD   blood glucose test strips (CONTOUR NEXT TEST) strip 1 each by In Vitro route daily As needed. Yes Car Mcrae MD   allopurinol (ZYLOPRIM) 100 MG tablet Take 1 tablet by mouth 2 times daily Yes Car Mcrae MD   cloNIDine (CATAPRES) 0.1 MG tablet TAKE ONE TABLET BY MOUTH TWICE A DAY FOR HIGH BLOOD PRESSURE Yes Car Mcrae MD   aspirin 81 MG tablet Take 81 mg by mouth daily Yes Historical Provider, MD   Ascorbic Acid (VITAMIN C) 250 MG tablet Take 250 mg by mouth daily. Yes Historical Provider, MD   vitamin E 1000 UNITS capsule Take 1,000 Units by mouth daily.  Yes Historical Provider, MD       Past Medical History:   Diagnosis Date    Acid reflux     GERD (gastroesophageal reflux disease)     Hx of blood clots     left leg    Hypertension        Past Surgical History:   Procedure Laterality Date    CARDIOVASCULAR STRESS TEST  2015    CAROTID ENDARTERECTOMY Left 2016    CAROTID ENDARTERECTOMY WITH GORETEX ACUSEAL PATCH ANGIOPLASTY performed by Perez Medina MD at [facility]      COLONOSCOPY      LEG SURGERY      (L) blood clot    VASCULAR SURGERY  7/14/2015 [facility]    Eversion right carotid endarterectomy        Family History   Problem Relation Age of Onset    No Known Problems Mother     No Known Problems Father        CareTeam (Including outside providers/suppliers regularly involved in providing care):   Patient Care Team:  Marek Stephen MD as PCP - General (Family Medicine)  Marek Stephen MD as PCP - [facility] Empaneled Provider  No Team Members    Wt Readings from Last 3 Encounters:   09/09/20 240 lb (108.9 kg)   08/24/20 246 lb 6.4 oz (111.8 kg)   12/18/19 256 lb (116.1 kg)     Vitals:    09/09/20 0801   BP: 138/78   Weight: 240 lb (108.9 kg)   Height: 5' 11\" (1.803 m)     Body mass index is 33.47 kg/m². Based upon direct observation of the patient, evaluation of cognition reveals recent and remote memory intact. Patient's complete Health Risk Assessment and screening values have been reviewed and are found in Flowsheets. The following problems were reviewed today and where indicated follow up appointments were made and/or referrals ordered. Positive Risk Factor Screenings with Interventions:     General Health:  General  In general, how would you say your health is?: Very Good  In the past 7 days, have you experienced any of the following?  New or Increased Pain, New or Increased Fatigue, Loneliness, Social Isolation, Stress or Anger?: None of These  Do you get the social and emotional support that you need?: Yes  Do you have a Living Will?: (!) No  General Health Risk Interventions:  · No Living Will: provided the state-specific advance directive document to the patient    Health Habits/Nutrition:  Health Habits/Nutrition  Do you exercise for at least 20 minutes 2-3 times per week?: (!) No  Have you lost any weight without trying in the past 3 Meningococcal (ACWY) vaccine  Aged Out     Recommendations for Preventive Services Due: see orders and patient instructions/AVS.  Health Maintenance Plan reviewed . Recommended screening schedule for the next 5-10 years is provided to the patient in written form: see Patient Instructions/AVS.    IMeg LPN, 4/1/4005, performed the documented evaluation under the direct supervision of the attending physician. Manuela Garcia is a 79 y.o. male evaluated via telephone on 9/9/2020. Consent:  He and/or health care decision maker is aware that that he may receive a bill for this telephone service, depending on his insurance coverage, and has provided verbal consent to proceed: Yes      Documentation:  I communicated with the patient and/or health care decision maker about AWV. Details of this discussion including any medical advice provided. I affirm this is a Patient Initiated Episode with a Patient who has not had a related appointment within my department in the past 7 days or scheduled within the next 24 hours.     Patient identification was verified at the start of the visit: Yes    Total Time: minutes: 11-20 minutes    Note: not billable if this call serves to triage the patient into an appointment for the relevant concern      Meg Deal

## 2020-09-09 NOTE — PATIENT INSTRUCTIONS
Personalized Preventive Plan for Thomasene Rubinstein - 9/9/2020  Medicare offers a range of preventive health benefits. Some of the tests and screenings are paid in full while other may be subject to a deductible, co-insurance, and/or copay. Some of these benefits include a comprehensive review of your medical history including lifestyle, illnesses that may run in your family, and various assessments and screenings as appropriate. After reviewing your medical record and screening and assessments performed today your provider may have ordered immunizations, labs, imaging, and/or referrals for you. A list of these orders (if applicable) as well as your Preventive Care list are included within your After Visit Summary for your review. Other Preventive Recommendations:    · A preventive eye exam performed by an eye specialist is recommended every 1-2 years to screen for glaucoma; cataracts, macular degeneration, and other eye disorders. · A preventive dental visit is recommended every 6 months. · Try to get at least 150 minutes of exercise per week or 10,000 steps per day on a pedometer . · Order or download the FREE \"Exercise & Physical Activity: Your Everyday Guide\" from [employer]. Call 3-897.905.6281 or search [employer] online. · You need 7048-1722 mg of calcium and 4558-3568 IU of vitamin D per day. It is possible to meet your calcium requirement with diet alone, but a vitamin D supplement is usually necessary to meet this goal.  · When exposed to the sun, use a sunscreen that protects against both UVA and UVB radiation with an SPF of 30 or greater. Reapply every 2 to 3 hours or after sweating, drying off with a towel, or swimming. · Always wear a seat belt when traveling in a car. Always wear a helmet when riding a bicycle or motorcycle.

## 2021-03-01 ENCOUNTER — OFFICE VISIT (OUTPATIENT)
Dept: PRIMARY CARE CLINIC | Age: 81
End: 2021-03-01
Payer: MEDICARE

## 2021-03-01 VITALS
TEMPERATURE: 96.9 F | WEIGHT: 252.4 LBS | BODY MASS INDEX: 36.13 KG/M2 | HEIGHT: 70 IN | OXYGEN SATURATION: 94 % | HEART RATE: 86 BPM | DIASTOLIC BLOOD PRESSURE: 80 MMHG | SYSTOLIC BLOOD PRESSURE: 162 MMHG | RESPIRATION RATE: 18 BRPM

## 2021-03-01 DIAGNOSIS — I10 ESSENTIAL HYPERTENSION: ICD-10-CM

## 2021-03-01 DIAGNOSIS — I10 ELEVATED SYSTOLIC BLOOD PRESSURE READING WITH DIAGNOSIS OF HYPERTENSION: ICD-10-CM

## 2021-03-01 DIAGNOSIS — R06.02 SHORTNESS OF BREATH: ICD-10-CM

## 2021-03-01 DIAGNOSIS — E78.5 HYPERLIPIDEMIA, UNSPECIFIED HYPERLIPIDEMIA TYPE: ICD-10-CM

## 2021-03-01 DIAGNOSIS — R94.31 ABNORMAL EKG: ICD-10-CM

## 2021-03-01 DIAGNOSIS — E11.9 TYPE 2 DIABETES MELLITUS WITHOUT COMPLICATION, WITHOUT LONG-TERM CURRENT USE OF INSULIN (HCC): Primary | ICD-10-CM

## 2021-03-01 LAB
ALBUMIN SERPL-MCNC: 4.6 G/DL (ref 3.5–5.2)
ALP BLD-CCNC: 64 U/L (ref 40–130)
ALT SERPL-CCNC: 37 U/L (ref 5–41)
ANION GAP SERPL CALCULATED.3IONS-SCNC: 16 MMOL/L (ref 7–19)
AST SERPL-CCNC: 28 U/L (ref 5–40)
BILIRUB SERPL-MCNC: 0.3 MG/DL (ref 0.2–1.2)
BUN BLDV-MCNC: 25 MG/DL (ref 8–23)
CALCIUM SERPL-MCNC: 10.1 MG/DL (ref 8.8–10.2)
CHLORIDE BLD-SCNC: 99 MMOL/L (ref 98–111)
CHOLESTEROL, TOTAL: 205 MG/DL (ref 160–199)
CO2: 24 MMOL/L (ref 22–29)
CREAT SERPL-MCNC: 1.4 MG/DL (ref 0.5–1.2)
GFR AFRICAN AMERICAN: 59
GFR NON-AFRICAN AMERICAN: 49
GLUCOSE BLD-MCNC: 124 MG/DL (ref 74–109)
HBA1C MFR BLD: 6.2 % (ref 4–6)
HCT VFR BLD CALC: 46.4 % (ref 42–52)
HDLC SERPL-MCNC: 37 MG/DL (ref 55–121)
HEMOGLOBIN: 15.3 G/DL (ref 14–18)
LDL CHOLESTEROL CALCULATED: 122 MG/DL
MCH RBC QN AUTO: 31.7 PG (ref 27–31)
MCHC RBC AUTO-ENTMCNC: 33 G/DL (ref 33–37)
MCV RBC AUTO: 96.3 FL (ref 80–94)
PDW BLD-RTO: 12.5 % (ref 11.5–14.5)
PLATELET # BLD: 158 K/UL (ref 130–400)
PMV BLD AUTO: 12.1 FL (ref 9.4–12.4)
POTASSIUM SERPL-SCNC: 4.3 MMOL/L (ref 3.5–5)
RBC # BLD: 4.82 M/UL (ref 4.7–6.1)
SODIUM BLD-SCNC: 139 MMOL/L (ref 136–145)
TOTAL PROTEIN: 8.2 G/DL (ref 6.6–8.7)
TRIGL SERPL-MCNC: 230 MG/DL (ref 0–149)
TSH SERPL DL<=0.05 MIU/L-ACNC: 2.03 UIU/ML (ref 0.27–4.2)
WBC # BLD: 7.2 K/UL (ref 4.8–10.8)

## 2021-03-01 PROCEDURE — G8483 FLU IMM NO ADMIN DOC REA: HCPCS | Performed by: FAMILY MEDICINE

## 2021-03-01 PROCEDURE — 1123F ACP DISCUSS/DSCN MKR DOCD: CPT | Performed by: FAMILY MEDICINE

## 2021-03-01 PROCEDURE — G8417 CALC BMI ABV UP PARAM F/U: HCPCS | Performed by: FAMILY MEDICINE

## 2021-03-01 PROCEDURE — G8427 DOCREV CUR MEDS BY ELIG CLIN: HCPCS | Performed by: FAMILY MEDICINE

## 2021-03-01 PROCEDURE — 4040F PNEUMOC VAC/ADMIN/RCVD: CPT | Performed by: FAMILY MEDICINE

## 2021-03-01 PROCEDURE — 1036F TOBACCO NON-USER: CPT | Performed by: FAMILY MEDICINE

## 2021-03-01 PROCEDURE — 93000 ELECTROCARDIOGRAM COMPLETE: CPT | Performed by: FAMILY MEDICINE

## 2021-03-01 PROCEDURE — 99214 OFFICE O/P EST MOD 30 MIN: CPT | Performed by: FAMILY MEDICINE

## 2021-03-01 PROCEDURE — 81002 URINALYSIS NONAUTO W/O SCOPE: CPT | Performed by: FAMILY MEDICINE

## 2021-03-01 PROCEDURE — 36415 COLL VENOUS BLD VENIPUNCTURE: CPT | Performed by: FAMILY MEDICINE

## 2021-03-01 RX ORDER — AMLODIPINE BESYLATE 10 MG/1
TABLET ORAL
Qty: 90 TABLET | Refills: 3 | Status: SHIPPED | OUTPATIENT
Start: 2021-03-01 | End: 2021-09-10

## 2021-03-01 ASSESSMENT — PATIENT HEALTH QUESTIONNAIRE - PHQ9
1. LITTLE INTEREST OR PLEASURE IN DOING THINGS: 0
2. FEELING DOWN, DEPRESSED OR HOPELESS: 0

## 2021-03-01 NOTE — PROGRESS NOTES
SUBJECTIVE:   Emma White is a 79 y.o. male presenting for his annual checkup. Medicare will not pay for an annual checkup but he has problems we follow including hypertension and hyperlipidemia. He is also a diabetic. He denies any numbness or tingling in his feet. He denies any change in bowel or bladder function. He had a PSA done last year and it was normal at 1.28. He is a little concerned about his diabetic medication. He says the Januvia is too expensive. Glucoses are usually less than 140. He has never tried anything other than Januvia. He is not working now and has not done a lot of things this winter but he is concerned because at night he has noticed increased shortness of breath for about a month. He says that usually when he is at rest or when he is lying down he feels like he just cannot take a good deep breath. Blood pressure is always a little high at doctor's offices. He denies polyuria polyphagia or polydipsia. He said no episodes of hypoglycemia. He denies any numbness or tingling in his feet. He has had his first Covid vaccine. He goes to [facility] in late March every year. He is due for colonoscopy in April with Dr. Paige Freeman.     Patient Active Problem List    Diagnosis Date Noted    Irregular heart beat     Bilateral carotid artery stenosis 07/27/2015    Hyperlipidemia 07/08/2015    Tendonitis 05/08/2012    Essential hypertension 05/08/2012     Current Outpatient Medications   Medication Sig Dispense Refill    amLODIPine (NORVASC) 10 MG tablet TAKE 1 TABLET EVERY NIGHT for blood pressure 90 tablet 3    metFORMIN (GLUCOPHAGE) 500 MG tablet Take 1 tablet by mouth 2 times daily (with meals) For diabetes 60 tablet 5    indapamide (LOZOL) 1.25 MG tablet TAKE 1 TABLET EVERY DAY 90 tablet 3    furosemide (LASIX) 20 MG tablet TAKE 1 TABLET EVERY DAY 90 tablet 3    blood glucose test strips (CONTOUR NEXT TEST) strip 1 each by In Vitro route daily As needed. 100 each 3    allopurinol (ZYLOPRIM) 100 MG tablet Take 1 tablet by mouth 2 times daily 180 tablet 3    cloNIDine (CATAPRES) 0.1 MG tablet TAKE ONE TABLET BY MOUTH TWICE A DAY FOR HIGH BLOOD PRESSURE 60 tablet 3    aspirin 81 MG tablet Take 81 mg by mouth daily      Ascorbic Acid (VITAMIN C) 250 MG tablet Take 250 mg by mouth daily.  vitamin E 1000 UNITS capsule Take 1,000 Units by mouth daily. No current facility-administered medications for this visit. Past Medical History:   Diagnosis Date    Acid reflux     GERD (gastroesophageal reflux disease)     Hx of blood clots     left leg    Hypertension      Past Surgical History:   Procedure Laterality Date    CARDIOVASCULAR STRESS TEST  07/2015    CAROTID ENDARTERECTOMY Left 6/28/2016    CAROTID ENDARTERECTOMY WITH GORETEX ACUSEAL PATCH ANGIOPLASTY performed [facility] Chilo [facility]ger [facility]      COLONOSCOPY      LEG SURGERY      (L) blood clot    VASCULAR SURGERY  7/14/2015 [facility]    Eversion right carotid endarterectomy      Family History   Problem Relation Age of Onset    No Known Problems Mother     No Known Problems Father      Social History     Tobacco Use    Smoking status: Never Smoker    Smokeless tobacco: Never Used   Substance Use Topics    Alcohol use: No       Allergies: Patient has no known allergies. ROS:  Feeling well. No dyspnea or chest pain on exertion. No abdominal pain, change in bowel habits, black or bloody stools. No urinary tract or prostatic symptoms. No neurological complaints. All other systems negative. OBJECTIVE:   The patient appears well, alert, oriented x 3, in no distress. BP (!) 162/80   Pulse 86   Temp 96.9 °F (36.1 °C)   Resp 18   Ht 5' 10\" (1.778 m)   Wt 252 lb 6.4 oz (114.5 kg)   SpO2 94%   BMI 36.22 kg/m²   Skin normal, no suspicious skin lesions. Scattered seborrheic keratoses. ENT normal.  Neck supple. No adenopathy or thyromegaly. MARLON. Lungs are clear, good air entry, no wheezes, rhonchi or rales. S1 and S2 normal, no murmurs, regular rate and rhythm. Abdomen is soft without tenderness, guarding, mass or organomegaly.  exam: Not examined in light of his soon to be colonoscopy and lack of symptoms and a normal PSA last year. Extremities show no edema, normal peripheral pulses. Neurological is normal without focal findings. Sensory exam of the foot is normal, tested with the monofilament. Good pulses, no lesions or ulcers, good peripheral pulses. Psychiatric exam, no signs of depression. ASSESSMENT:   1. Type 2 diabetes mellitus without complication, without long-term current use of insulin ([facility] [address]    2. Essential hypertension    3. Hyperlipidemia, unspecified hyperlipidemia type    4. Elevated systolic blood pressure reading with diagnosis of hypertension    5. Shortness of breath    6. Abnormal EKG        PLAN:   Lifestyle advice: discussed diet and exercise  MEDICATIONS:  Orders Placed This Encounter   Medications    amLODIPine (NORVASC) 10 MG tablet     Sig: TAKE 1 TABLET EVERY NIGHT for blood pressure     Dispense:  90 tablet     Refill:  3    metFORMIN (GLUCOPHAGE) 500 MG tablet     Sig: Take 1 tablet by mouth 2 times daily (with meals) For diabetes     Dispense:  60 tablet     Refill:  5     Stop Januvia       ORDERS:  Orders Placed This Encounter   Procedures    XR CHEST (2 VW)    Comprehensive Metabolic Panel    Lipid Panel    CBC    TSH without Reflex    Hemoglobin A1C    External Referral To Cardiology    POCT Urinalysis no Micro    EKG 12 Lead     DIABETES FOOT EXAM     I am treating him under comprehensive care plan for his diabetes. This includes diet and exercise. He does not meet the criteria for diabetic shoes. I encouraged him to check his glucoses once a day. If his shortness of breath worsens or is accompanied by chest pain or diaphoresis he is to go immediately to the emergency room.     I will contact him when we get results of his blood work. I am stopping the Januvia and changing him to Metformin. We may need to increase this. He will continue to monitor his blood pressure at home. He may just be out of shape over the winter but we need to make sure that EKG and chest x-ray are normal.  He says he will call Dr. Ricardo Camacho right after this visit for his colonoscopy. EKG showed a first-degree AV block. The VA interval was much longer than it was when compared to a tracing of 9/1/2017. There was nonspecific QRS widening which was seen on the previous tracing. I am going to refer him to a cardiologist for further evaluation. In light of his age, hypertension and this EKG I am afraid he may have some underlying heart disease especially with his new shortness of breath. He prefers to go to [facility].            Follow-up:  Return in about 6 months (around 9/1/2021) for Diabetes checkup. Mima Prescott PATIENT INSTRUCTIONS:  Patient Instructions   Stop the Januvia. I have sent in a medication to [facility], Metformin 500 mg 1 tablet twice a day before a meal.  The main side effect is loose stools. If this happens please call me. If your glucoses start to get higher than 140 when you are checking them we can go up to 1000 mg of Metformin twice a day. We will contact you with results of your blood work. If it is normal and your shortness of breath worsens, please call us. If you develop chest pain, shortness of breath or excessive sweating with exertion, please go immediately to the emergency room. EMR Dragon/transcription disclaimer:  Much of this encounter note is electronic transcription/translation of spoken language to printed texts. The electronic translation of spoken language may be erroneous, or at times, nonsensical words or phrases may be inadvertently transcribed.   Although I have reviewed the note for such errors, some may still exist.

## 2021-03-01 NOTE — PATIENT INSTRUCTIONS
Stop the Januvia. I have sent in a medication to [facility], Metformin 500 mg 1 tablet twice a day before a meal.  The main side effect is loose stools. If this happens please call me. If your glucoses start to get higher than 140 when you are checking them we can go up to 1000 mg of Metformin twice a day. We will contact you with results of your blood work. If it is normal and your shortness of breath worsens, please call us. If you develop chest pain, shortness of breath or excessive sweating with exertion, please go immediately to the emergency room.

## 2021-03-02 RX ORDER — ALLOPURINOL 100 MG/1
TABLET ORAL
Qty: 180 TABLET | Refills: 3 | Status: SHIPPED | OUTPATIENT
Start: 2021-03-02 | End: 2022-01-04

## 2021-03-04 ENCOUNTER — OFFICE VISIT (OUTPATIENT)
Dept: CARDIOLOGY | Facility: CLINIC | Age: 81
End: 2021-03-04

## 2021-03-04 VITALS
BODY MASS INDEX: 35.7 KG/M2 | WEIGHT: 255 LBS | SYSTOLIC BLOOD PRESSURE: 126 MMHG | OXYGEN SATURATION: 99 % | HEART RATE: 80 BPM | HEIGHT: 71 IN | DIASTOLIC BLOOD PRESSURE: 80 MMHG

## 2021-03-04 DIAGNOSIS — I10 ESSENTIAL HYPERTENSION: ICD-10-CM

## 2021-03-04 DIAGNOSIS — R06.02 SHORTNESS OF BREATH: Primary | ICD-10-CM

## 2021-03-04 DIAGNOSIS — E78.5 HYPERLIPIDEMIA, UNSPECIFIED HYPERLIPIDEMIA TYPE: ICD-10-CM

## 2021-03-04 PROCEDURE — 99204 OFFICE O/P NEW MOD 45 MIN: CPT | Performed by: INTERNAL MEDICINE

## 2021-03-04 RX ORDER — ALLOPURINOL 100 MG/1
TABLET ORAL
COMMUNITY
Start: 2021-03-02

## 2021-03-04 RX ORDER — AMLODIPINE BESYLATE 10 MG/1
TABLET ORAL
COMMUNITY
Start: 2021-03-01

## 2021-03-04 RX ORDER — INDAPAMIDE 1.25 MG/1
1.25 TABLET, FILM COATED ORAL 2 TIMES DAILY
COMMUNITY
Start: 2021-03-02 | End: 2021-06-16 | Stop reason: SDUPTHER

## 2021-03-04 RX ORDER — MULTIVIT WITH MINERALS/LUTEIN
250 TABLET ORAL
COMMUNITY

## 2021-03-04 RX ORDER — ATORVASTATIN CALCIUM 10 MG/1
10 TABLET, FILM COATED ORAL DAILY
Qty: 90 TABLET | Refills: 1 | Status: SHIPPED | OUTPATIENT
Start: 2021-03-04 | End: 2021-05-27 | Stop reason: SINTOL

## 2021-03-04 RX ORDER — SITAGLIPTIN 100 MG/1
TABLET, FILM COATED ORAL
COMMUNITY
Start: 2020-12-17 | End: 2021-04-27

## 2021-03-04 RX ORDER — CLONIDINE HYDROCHLORIDE 0.1 MG/1
0.1 TABLET ORAL DAILY
COMMUNITY
Start: 2020-12-07

## 2021-03-04 RX ORDER — MULTIVIT WITH MINERALS/LUTEIN
1000 TABLET ORAL
COMMUNITY

## 2021-03-05 PROCEDURE — 93000 ELECTROCARDIOGRAM COMPLETE: CPT | Performed by: INTERNAL MEDICINE

## 2021-03-05 NOTE — PROGRESS NOTES
P - CARDIOLOGY  New Patient Initial Outpatient Evaluation    Primary Care Physician: Provider, No Known    Subjective     Chief Complaint:  Shortness of breath    History of Present Illness  Mr. Mcgill is an 81-year-old diabetic with hypertension who was referred to me for urgent evaluation by Dr. Loreta Russo due to shortness of breath. He was just seen by her in the office on 03/01/2021, at which point she documented he was concerned regarding shortness of breath at night for the last month.     Mr. Mcgill comes to clinic accompanied by an adult female who contributes to his history. He states that he had a tooth break off 1 week ago, was put on antibiotics and pain medications. While on the antibiotics and pain medications, he was experiencing dyspnea within the last 2 weeks, and noticed this when he lying in bed with a feeling of smothering. The symptoms did not keep him from sleeping. He denies experiencing dyspnea on exertion. He denies recent symptoms of chest heaviness, pressure, tightness, squeezing, heart skipping or racing. He states that he has not been having any swelling in the legs. The adult female that accompanies him today states that he snores, but does not stop breathing. He denies having been checked for sleep apnea. He states that he knows that he wheezes. He recently drove to [place], and states that he did not notice either leg swelling afterward. He attributes his dyspnea to taking pain pills, and states that since discontinuing pain pills, he feels better.    Review of Systems   HENT: Negative for nosebleeds.    Cardiovascular: Positive for orthopnea. Negative for chest pain, claudication, dyspnea on exertion, irregular heartbeat, leg swelling, near-syncope, palpitations, paroxysmal nocturnal dyspnea and syncope.   Respiratory: Positive for shortness of breath. Negative for cough and wheezing.    Hematologic/Lymphatic: Negative for bleeding problem. Does not bruise/bleed easily.    "  Gastrointestinal: Negative for dysphagia, hematemesis, hematochezia and melena.   Genitourinary: Negative for hematuria and non-menstrual bleeding.   Otherwise complete ROS reviewed and negative except as mentioned in the HPI.      Past Medical History:   Past Medical History:   Diagnosis Date   • Deep vein thrombosis (CMS/HCC)    • Diabetes mellitus (CMS/HCC)    • Hyperlipidemia    • Hypertension        Past Surgical History:History reviewed. No pertinent surgical history.    Family History: family history is not on file.    Social History:  reports that he has never smoked. He quit smokeless tobacco use about 31 years ago.  His smokeless tobacco use included chew. He reports previous alcohol use. Drug use questions deferred to the physician.    Medications:  Prior to Admission medications    Medication Sig Start Date End Date Taking? Authorizing Provider   allopurinol (ZYLOPRIM) 100 MG tablet allopurinol 100 mg tablet 3/2/21  Yes Obdulio Vidal MD   amLODIPine (NORVASC) 10 MG tablet amlodipine 10 mg tablet 3/1/21  Yes ProviderObdulio MD   Aspirin Buf,CaCarb-MgCarb-MgO, 81 MG tablet Take 81 mg by mouth.   Yes Obdulio Vidal MD   cloNIDine (CATAPRES) 0.1 MG tablet  12/7/20  Yes Obdulio Vidal MD   indapamide (LOZOL) 1.25 MG tablet 1.25 mg. Takes 2 tablets daily 3/2/21  Yes Obdulio Vidal MD   Januvia 100 MG tablet  12/17/20  Yes ProviderObdulio MD   vitamin C (ASCORBIC ACID) 250 MG tablet Take 250 mg by mouth.   Yes Obdulio Vidal MD   vitamin E 1000 UNIT capsule Take 1,000 Units by mouth.   Yes ProviderObdulio MD     Allergies:  No Known Allergies    Objective     Vital Signs: /80   Pulse 80   Ht 180.3 cm (71\")   Wt 116 kg (255 lb)   SpO2 99%   BMI 35.57 kg/m²     Vitals signs and nursing note reviewed.   Constitutional:       General: Not in acute distress.     Appearance: Not in distress.   Neck:      Vascular: No JVD or JVR. JVD normal. "   Pulmonary:      Effort: Pulmonary effort is normal.      Breath sounds: Normal breath sounds.      Comments: Faint crackles at the right lower base of the lung, but otherwise normal.  Cardiovascular:      Normal rate. Regular rhythm.      Murmurs: There is no murmur.      No gallop. No click.   Pulses:     Intact distal pulses.   Edema:     Peripheral edema absent.   Musculoskeletal:         General: No tenderness.   Skin:     General: Skin is warm and dry.   Neurological:      Mental Status: Alert, oriented to person, place, and time and oriented to person, place and time.         Results Reviewed:  The following labs were reviewed from 03/01/2021 at [facility]:  - Lipid panel shows total cholesterol 205, triglycerides 230, HDL 37, .  - CBC unremarkable with normal white blood cell count, hemoglobin, and platelets.   - Basic metabolic panel shows normal sodium, and potassium, mild elevation of creatinine at 1.4 with GFR of 49.  - No BNP was included.    Imaging reviewed include a chest-x-ray from 03/01/2021. Interpretation by radiology notes: No acute abnormality, but does mention a mild elevation of the left hemidiaphragm that was similar to previous x-ray.      ECG 12 Lead    Date/Time: 3/5/2021 7:04 AM  Performed by: Bill Crawford MD  Authorized by: Bill Crawford MD   Comparison: compared with previous ECG from 3/1/2021  Similar to previous ECG  Rhythm: sinus rhythm  Conduction: 1st degree AV block  Conduction comments:  Left axis deviation and moderate intraventricular conduction delay.  Other findings: non-specific ST-T wave changes    Clinical impression: abnormal EKG              Lab Results   Component Value Date    TRIG 230 (H) 03/01/2021    HDL 37 (L) 03/01/2021     Lab Results   Component Value Date    HGBA1C 6.2 (H) 03/01/2021            Assessment / Plan        Problem List Items Addressed This Visit        Cardiac and Vasculature    Hypertension    Relevant Medications    amLODIPine  (NORVASC) 10 MG tablet    cloNIDine (CATAPRES) 0.1 MG tablet    indapamide (LOZOL) 1.25 MG tablet    Hyperlipidemia      Other Visit Diagnoses     Shortness of breath    -  Primary    Relevant Orders    Adult Transthoracic Echo Complete w/ Color, Spectral and Contrast if necessary per protocol        1. Shortness of breath. The patient's description of shortness of breath would be most compatible with that of orthopnea, as it is present at night when recumbent. However, he does state symptoms have improved a little bit, and he does not describe nor demonstrate on exam other symptoms or signs that would be consistent with this complaint of orthopnea coming from congestive heart failure, i.e., pulmonary edema. Particularly, he is not experiencing rapid weight gain, has not been swelling, and has improved subjectively over the last few days without any other treatment being provided that would have helped diurese any excess fluid that was being retained. Of note, indapamide is a thiazide diuretic, but does not actually cause a diuretic response the way loop diuretics (furosemide, bumetanide, and torsemide) do. In fact, he was previously on furosemide, which was just recently stopped.  - Will investigate cardiac function and structural integrity with an echocardiogram, and follow up with patient thereafter.    2. Essential hypertension - well controlled on current regimen.  - Continue Norvasc, and indapamide as prescribed by Dr. Russo.    3. Mixed hyperlipidemia. Not on therapy, but recent LDL at 122. Given his diabetic status, but lack of known CAD, there is no absolute indication for initiation of statin therapy at this time. We will defer ongoing surveillance, and management to Dr. Russo.    Scribed for Bill Crawford MD by ARI NGUYEN.  3/5/2021  07:02 CST    I Bill Crawford MD have personally performed the services described in this document as scribed by the above individual, and it is both accurate and  complete.   I have edited each component as needed.    Bill Crawford MD  3/5/2021  11:13 CST

## 2021-04-02 ENCOUNTER — HOSPITAL ENCOUNTER (OUTPATIENT)
Dept: CARDIOLOGY | Facility: HOSPITAL | Age: 81
Discharge: HOME | End: 2021-04-02
Admitting: INTERNAL MEDICINE

## 2021-04-02 VITALS
BODY MASS INDEX: 35.7 KG/M2 | WEIGHT: 255 LBS | DIASTOLIC BLOOD PRESSURE: 88 MMHG | SYSTOLIC BLOOD PRESSURE: 160 MMHG | HEIGHT: 71 IN

## 2021-04-02 DIAGNOSIS — R06.02 SHORTNESS OF BREATH: ICD-10-CM

## 2021-04-02 LAB
BH CV ECHO MEAS - AO MAX PG (FULL): 6.8 MMHG
BH CV ECHO MEAS - AO MAX PG: 8.9 MMHG
BH CV ECHO MEAS - AO MEAN PG (FULL): 4 MMHG
BH CV ECHO MEAS - AO MEAN PG: 5 MMHG
BH CV ECHO MEAS - AO ROOT AREA (BSA CORRECTED): 1.6
BH CV ECHO MEAS - AO ROOT AREA: 10.8 CM^2
BH CV ECHO MEAS - AO ROOT DIAM: 3.7 CM
BH CV ECHO MEAS - AO V2 MAX: 149 CM/SEC
BH CV ECHO MEAS - AO V2 MEAN: 107 CM/SEC
BH CV ECHO MEAS - AO V2 VTI: 33.1 CM
BH CV ECHO MEAS - AVA(I,A): 2.3 CM^2
BH CV ECHO MEAS - AVA(I,D): 2.3 CM^2
BH CV ECHO MEAS - AVA(V,A): 2.4 CM^2
BH CV ECHO MEAS - AVA(V,D): 2.4 CM^2
BH CV ECHO MEAS - BSA(HAYCOCK): 2.4 M^2
BH CV ECHO MEAS - BSA: 2.3 M^2
BH CV ECHO MEAS - BZI_BMI: 35.6 KILOGRAMS/M^2
BH CV ECHO MEAS - BZI_METRIC_HEIGHT: 180.3 CM
BH CV ECHO MEAS - BZI_METRIC_WEIGHT: 115.7 KG
BH CV ECHO MEAS - EDV(CUBED): 114.8 ML
BH CV ECHO MEAS - EDV(MOD-SP4): 197 ML
BH CV ECHO MEAS - EDV(TEICH): 110.7 ML
BH CV ECHO MEAS - EF(CUBED): 25.3 %
BH CV ECHO MEAS - EF(TEICH): 20.3 %
BH CV ECHO MEAS - ESV(CUBED): 85.8 ML
BH CV ECHO MEAS - ESV(MOD-SP4): 92.1 ML
BH CV ECHO MEAS - ESV(TEICH): 88.2 ML
BH CV ECHO MEAS - FS: 9.3 %
BH CV ECHO MEAS - IVS/LVPW: 1.2
BH CV ECHO MEAS - IVSD: 1.3 CM
BH CV ECHO MEAS - LA DIMENSION: 4.4 CM
BH CV ECHO MEAS - LA/AO: 1.2
BH CV ECHO MEAS - LAT PEAK E' VEL: 5.7 CM/SEC
BH CV ECHO MEAS - LV DIASTOLIC VOL/BSA (35-75): 84.2 ML/M^2
BH CV ECHO MEAS - LV MASS(C)D: 210.5 GRAMS
BH CV ECHO MEAS - LV MASS(C)DI: 90 GRAMS/M^2
BH CV ECHO MEAS - LV MAX PG: 2.1 MMHG
BH CV ECHO MEAS - LV MEAN PG: 1 MMHG
BH CV ECHO MEAS - LV SYSTOLIC VOL/BSA (12-30): 39.4 ML/M^2
BH CV ECHO MEAS - LV V1 MAX: 71.7 CM/SEC
BH CV ECHO MEAS - LV V1 MEAN: 50.1 CM/SEC
BH CV ECHO MEAS - LV V1 VTI: 15.5 CM
BH CV ECHO MEAS - LVIDD: 4.9 CM
BH CV ECHO MEAS - LVIDS: 4.4 CM
BH CV ECHO MEAS - LVLD AP4: 9.8 CM
BH CV ECHO MEAS - LVLS AP4: 8 CM
BH CV ECHO MEAS - LVOT AREA (M): 4.9 CM^2
BH CV ECHO MEAS - LVOT AREA: 4.9 CM^2
BH CV ECHO MEAS - LVOT DIAM: 2.5 CM
BH CV ECHO MEAS - LVPWD: 1 CM
BH CV ECHO MEAS - MED PEAK E' VEL: 5 CM/SEC
BH CV ECHO MEAS - MV A MAX VEL: 90.7 CM/SEC
BH CV ECHO MEAS - MV DEC TIME: 0.19 SEC
BH CV ECHO MEAS - MV E MAX VEL: 58.7 CM/SEC
BH CV ECHO MEAS - MV E/A: 0.65
BH CV ECHO MEAS - PA MAX PG: 3 MMHG
BH CV ECHO MEAS - PA V2 MAX: 86.3 CM/SEC
BH CV ECHO MEAS - RAP SYSTOLE: 5 MMHG
BH CV ECHO MEAS - RVSP: 27.7 MMHG
BH CV ECHO MEAS - SI(AO): 152.2 ML/M^2
BH CV ECHO MEAS - SI(CUBED): 12.4 ML/M^2
BH CV ECHO MEAS - SI(LVOT): 32.5 ML/M^2
BH CV ECHO MEAS - SI(MOD-SP4): 44.9 ML/M^2
BH CV ECHO MEAS - SI(TEICH): 9.6 ML/M^2
BH CV ECHO MEAS - SV(AO): 355.9 ML
BH CV ECHO MEAS - SV(CUBED): 29 ML
BH CV ECHO MEAS - SV(LVOT): 76.1 ML
BH CV ECHO MEAS - SV(MOD-SP4): 104.9 ML
BH CV ECHO MEAS - SV(TEICH): 22.5 ML
BH CV ECHO MEAS - TR MAX VEL: 238 CM/SEC
BH CV ECHO MEASUREMENTS AVERAGE E/E' RATIO: 10.97
LEFT ATRIUM VOLUME INDEX: 32 ML/M2
LEFT ATRIUM VOLUME: 74.8 CM3
LV EF 2D ECHO EST: 45 %
MAXIMAL PREDICTED HEART RATE: 138 BPM
STRESS TARGET HR: 117 BPM

## 2021-04-02 PROCEDURE — 93306 TTE W/DOPPLER COMPLETE: CPT | Performed by: INTERNAL MEDICINE

## 2021-04-02 PROCEDURE — 25010000002 PERFLUTREN 6.52 MG/ML SUSPENSION: Performed by: INTERNAL MEDICINE

## 2021-04-02 PROCEDURE — 93306 TTE W/DOPPLER COMPLETE: CPT

## 2021-04-02 RX ADMIN — PERFLUTREN 9.78 MG: 6.52 INJECTION, SUSPENSION INTRAVENOUS at 09:01

## 2021-04-06 RX ORDER — CLONIDINE HYDROCHLORIDE 0.1 MG/1
0.1 TABLET ORAL 2 TIMES DAILY
Qty: 180 TABLET | Refills: 3 | Status: SHIPPED | OUTPATIENT
Start: 2021-04-06 | End: 2022-01-04

## 2021-04-08 DIAGNOSIS — I50.21 ACUTE SYSTOLIC CHF (CONGESTIVE HEART FAILURE) (HCC): Primary | ICD-10-CM

## 2021-04-08 DIAGNOSIS — R06.09 DOE (DYSPNEA ON EXERTION): ICD-10-CM

## 2021-04-09 ENCOUNTER — TELEPHONE (OUTPATIENT)
Dept: CARDIOLOGY | Facility: CLINIC | Age: 81
End: 2021-04-09

## 2021-04-13 ENCOUNTER — TELEPHONE (OUTPATIENT)
Dept: CARDIOLOGY | Facility: CLINIC | Age: 81
End: 2021-04-13

## 2021-04-13 ENCOUNTER — TELEPHONE (OUTPATIENT)
Dept: PRIMARY CARE CLINIC | Age: 81
End: 2021-04-13

## 2021-04-13 NOTE — TELEPHONE ENCOUNTER
Patient called back asking for a prescription for the lasix 40mg daily to be sent to [facility] pharmacy. I will pend it to you. Eliza Chapman MA

## 2021-04-13 NOTE — TELEPHONE ENCOUNTER
Dr. Gayla Merino is very very good. I would do what he says. He is probably trying to get some fluid off his heart.

## 2021-04-13 NOTE — TELEPHONE ENCOUNTER
----- Message from Bill Crawford MD sent at 4/8/2021  3:46 PM CDT -----  Please let patient know I reviewed his echocardiogram.  His left ventricular function is slightly weakened, which certainly would then be a potential reason why he might be retaining fluid into his lungs and making him more out of breath when lying flat, which is the primary symptom that he discussed with me during our visit.  At that time, he noted that he had previously been taking Lasix (furosemide) but it had been stopped.  Please confirm with him what dose it was that he was taking, and ask if he still has any in his possession.  If he does, and he still complaining of the shortness of breath when lying flat, I would advise that he take the Lasix 40 mg every day for 5 days to see if this helps.  Also, I am going to order a stress test to investigate for reasons why the heart function may have slightly declined.  I will order this to be done within a week and then see if we can get him back for a clinic visit either with me or Dariela in about 2 to 3 weeks.  Thank you!

## 2021-04-14 ENCOUNTER — HOSPITAL ENCOUNTER (OUTPATIENT)
Dept: CARDIOLOGY | Facility: HOSPITAL | Age: 81
Discharge: HOME | End: 2021-04-14

## 2021-04-14 VITALS
DIASTOLIC BLOOD PRESSURE: 101 MMHG | HEART RATE: 78 BPM | HEIGHT: 71 IN | WEIGHT: 255.73 LBS | SYSTOLIC BLOOD PRESSURE: 189 MMHG | BODY MASS INDEX: 35.8 KG/M2

## 2021-04-14 DIAGNOSIS — R06.09 DOE (DYSPNEA ON EXERTION): ICD-10-CM

## 2021-04-14 DIAGNOSIS — I50.21 ACUTE SYSTOLIC CHF (CONGESTIVE HEART FAILURE) (HCC): ICD-10-CM

## 2021-04-14 PROCEDURE — 78452 HT MUSCLE IMAGE SPECT MULT: CPT

## 2021-04-14 PROCEDURE — 0 TECHNETIUM SESTAMIBI: Performed by: INTERNAL MEDICINE

## 2021-04-14 PROCEDURE — A9500 TC99M SESTAMIBI: HCPCS | Performed by: INTERNAL MEDICINE

## 2021-04-14 PROCEDURE — 93017 CV STRESS TEST TRACING ONLY: CPT

## 2021-04-14 PROCEDURE — 93018 CV STRESS TEST I&R ONLY: CPT | Performed by: INTERNAL MEDICINE

## 2021-04-14 PROCEDURE — 25010000002 REGADENOSON 0.4 MG/5ML SOLUTION: Performed by: INTERNAL MEDICINE

## 2021-04-14 PROCEDURE — 78452 HT MUSCLE IMAGE SPECT MULT: CPT | Performed by: INTERNAL MEDICINE

## 2021-04-14 RX ADMIN — TECHNETIUM TC 99M SESTAMIBI 1 DOSE: 1 INJECTION INTRAVENOUS at 10:30

## 2021-04-14 RX ADMIN — TECHNETIUM TC 99M SESTAMIBI 1 DOSE: 1 INJECTION INTRAVENOUS at 11:35

## 2021-04-14 RX ADMIN — REGADENOSON 0.4 MG: 0.08 INJECTION, SOLUTION INTRAVENOUS at 11:34

## 2021-04-15 LAB
BH CV REST NUCLEAR ISOTOPE DOSE: 11.1 MCI
BH CV STRESS BP STAGE 1: NORMAL
BH CV STRESS COMMENTS STAGE 1: NORMAL
BH CV STRESS DOSE REGADENOSON STAGE 1: 0.4
BH CV STRESS DURATION MIN STAGE 1: 0
BH CV STRESS DURATION SEC STAGE 1: 10
BH CV STRESS HR STAGE 1: 86
BH CV STRESS NUCLEAR ISOTOPE DOSE: 34.8 MCI
BH CV STRESS PROTOCOL 1: NORMAL
BH CV STRESS RECOVERY BP: NORMAL MMHG
BH CV STRESS RECOVERY HR: 83 BPM
BH CV STRESS STAGE 1: 1
LV EF NUC BP: 46 %
MAXIMAL PREDICTED HEART RATE: 138 BPM
PERCENT MAX PREDICTED HR: 62.32 %
STRESS BASELINE BP: NORMAL MMHG
STRESS BASELINE HR: 78 BPM
STRESS PERCENT HR: 73 %
STRESS POST EXERCISE DUR SEC: 10 SEC
STRESS POST PEAK BP: NORMAL MMHG
STRESS POST PEAK HR: 86 BPM
STRESS TARGET HR: 117 BPM

## 2021-04-15 RX ORDER — FUROSEMIDE 40 MG/1
40 TABLET ORAL DAILY
Qty: 5 TABLET | Refills: 0 | Status: ON HOLD | OUTPATIENT
Start: 2021-04-15 | End: 2021-05-24

## 2021-04-21 ENCOUNTER — OFFICE VISIT (OUTPATIENT)
Dept: GASTROENTEROLOGY | Facility: CLINIC | Age: 81
End: 2021-04-21

## 2021-04-21 VITALS
HEIGHT: 70 IN | TEMPERATURE: 96.8 F | SYSTOLIC BLOOD PRESSURE: 150 MMHG | OXYGEN SATURATION: 97 % | WEIGHT: 251 LBS | BODY MASS INDEX: 35.93 KG/M2 | DIASTOLIC BLOOD PRESSURE: 80 MMHG | HEART RATE: 79 BPM

## 2021-04-21 DIAGNOSIS — Z86.010 HISTORY OF ADENOMATOUS POLYP OF COLON: Primary | ICD-10-CM

## 2021-04-21 DIAGNOSIS — I10 ESSENTIAL HYPERTENSION: ICD-10-CM

## 2021-04-21 DIAGNOSIS — E11.9 TYPE 2 DIABETES MELLITUS WITHOUT COMPLICATION, WITHOUT LONG-TERM CURRENT USE OF INSULIN (HCC): ICD-10-CM

## 2021-04-21 PROCEDURE — 99204 OFFICE O/P NEW MOD 45 MIN: CPT | Performed by: NURSE PRACTITIONER

## 2021-04-21 RX ORDER — ASPIRIN 81 MG/1
81 TABLET ORAL DAILY
COMMUNITY

## 2021-04-21 RX ORDER — INDAPAMIDE 1.25 MG/1
1.25 TABLET, FILM COATED ORAL EVERY MORNING
COMMUNITY
End: 2021-04-27 | Stop reason: SDUPTHER

## 2021-04-21 NOTE — PROGRESS NOTES
[facility]    Primary Physician Loreta Russo MD    4/21/2021    Misael Mcgill   1940      Chief Complaint   Patient presents with   • Colonoscopy       Subjective     HPI    Misael Mcgill is a 81 y.o. male who presents as a referral for preventative maintenance. He has no complaints of nausea or vomiting. No change in bowels. No wt loss. No BRBPR. No melena. No abdominal pain.        Last colonoscopy was 4/2016 noting 2 polyps ( path tubular adenomatous), sigmoid diverticulosis, and internal hemorrhoid. Recommended recall 5 years.  The patient does not have history of colon cancer.   There is not a family history of colon polyps/colon cancer.     Past Medical History:   Diagnosis Date   • Colon polyp    • Colon polyp    • Deep vein thrombosis (CMS/HCC)    • Diabetes mellitus (CMS/HCC)    • Diverticulosis    • DVT (deep venous thrombosis) (CMS/HCC)    • Hyperlipidemia    • Hypertension        Past Surgical History:   Procedure Laterality Date   • COLONOSCOPY  04/14/2016    2 polyps, tubular adenoma, diverticulosis, internal hemorrhoids   • OTHER SURGICAL HISTORY      right carotid surgery       Outpatient Medications Marked as Taking for the 4/21/21 encounter (Office Visit) with Shanna Zayas APRN   Medication Sig Dispense Refill   • allopurinol (ZYLOPRIM) 100 MG tablet allopurinol 100 mg tablet     • amLODIPine (NORVASC) 10 MG tablet amlodipine 10 mg tablet     • aspirin 81 MG EC tablet Take 81 mg by mouth Daily.     • cloNIDine (CATAPRES) 0.1 MG tablet      • furosemide (LASIX) 40 MG tablet Take 1 tablet by mouth Daily. 5 tablet 0   • indapamide (LOZOL) 1.25 MG tablet Take 1.25 mg by mouth Every Morning.     • Januvia 100 MG tablet      • vitamin C (ASCORBIC ACID) 250 MG tablet Take 250 mg by mouth.     • vitamin E 1000 UNIT capsule Take 1,000 Units by mouth.         No Known Allergies    Social History     Socioeconomic History   • Marital status:      Spouse name:  Not on file   • Number of children: Not on file   • Years of education: Not on file   • Highest education level: Not on file   Tobacco Use   • Smoking status: Never Smoker   • Smokeless tobacco: Former User     Types: Chew   Vaping Use   • Vaping Use: Never used   Substance and Sexual Activity   • Alcohol use: Not Currently   • Drug use: Not Currently   • Sexual activity: Defer       Family History   Problem Relation Age of Onset   • Colon cancer Neg Hx        Review of Systems   Constitutional: Negative for chills, fever and unexpected weight change.   Respiratory: Negative for cough, shortness of breath and wheezing.    Cardiovascular: Negative for chest pain and palpitations.   Gastrointestinal: Negative for abdominal distention, abdominal pain, anal bleeding, blood in stool, constipation, diarrhea, nausea and vomiting.       Objective     Vitals:    04/21/21 1322   BP: 150/80   Pulse: 79   Temp: 96.8 °F (36 °C)   SpO2: 97%         04/21/21  1322   Weight: 114 kg (251 lb)     Body mass index is 36.01 kg/m².    Physical Exam  Vitals reviewed.   Constitutional:       General: He is not in acute distress.  Cardiovascular:      Rate and Rhythm: Normal rate and regular rhythm.      Heart sounds: Normal heart sounds.   Pulmonary:      Effort: Pulmonary effort is normal.      Breath sounds: Normal breath sounds.   Abdominal:      General: Bowel sounds are normal. There is no distension.      Palpations: Abdomen is soft.      Tenderness: There is no abdominal tenderness.   Skin:     General: Skin is warm and dry.   Neurological:      Mental Status: He is alert.         Imaging Results (Most Recent)     None          Assessment/Plan     Diagnoses and all orders for this visit:    1. History of adenomatous polyp of colon (Primary)  -     Case Request; Standing  -     Case Request    2. Essential hypertension    3. Type 2 diabetes mellitus without complication, without long-term current use of insulin (CMS/[facility])    Other  orders  -     Follow Anesthesia Guidelines / Protocol; Future  -     Implement Anesthesia Orders Day of Procedure; Standing  -     Obtain Informed Consent; Standing  -     Obtain Informed Consent; Future            Plan for colonoscopy. Use miralax prep. The patient was advised to take any blood pressure or heart  medications the morning of  procedure if that is when he/she normally takes.            Body mass index is 36.01 kg/m².    Patient's Body mass index is 36.01 kg/m². BMI is above normal parameters. Recommendations include: recommend weight loss, no f/u .      COLONOSCOPY WITH ANESTHESIA (N/A)  All risks, benefits, alternatives, and indications of colonoscopy procedure have been discussed with the patient. Risks to include perforation of the colon requiring possible surgery or colostomy, risk of bleeding from biopsies or removal of colon tissue, possibility of missing a colon polyp or cancer, or adverse drug reaction.  Benefits to include the diagnosis and management of disease of the colon and rectum. Alternatives to include barium enema, radiographic evaluation, lab testing or no intervention. Pt verbalizes understanding and agrees.         EPIFANIO Robledo Dragon/transcription disclaimer:  Much of this encounter note is electronic transcription/translation of spoken language to printed text.  The electronic translation of spoken language may be erroneous, or at times, nonsensical words or phrases may be inadvertently transcribed.  Although I have reviewed the note for such errors, some may still exist.

## 2021-04-26 ENCOUNTER — TELEPHONE (OUTPATIENT)
Dept: PRIMARY CARE CLINIC | Age: 81
End: 2021-04-26

## 2021-04-26 NOTE — TELEPHONE ENCOUNTER
I have down that we stop the Januvia because of his decreased kidney function. We also decrease his Metformin to 500 mg twice a day. I do not think this is going to be enough to control his sugars. Please make sure what new med he is talking about. It may be a sample.

## 2021-04-26 NOTE — TELEPHONE ENCOUNTER
Pt states the only med he is taking for BS is Metformin 500 mg BID.  He states the reason he stopped Januvia was the cost but he will start back on if you think he should

## 2021-04-26 NOTE — TELEPHONE ENCOUNTER
Pt states you had changed his BS meds. Now BS running over 200. He started the new med about 10 days ago.

## 2021-04-27 ENCOUNTER — PREP FOR SURGERY (OUTPATIENT)
Dept: OTHER | Facility: HOSPITAL | Age: 81
End: 2021-04-27

## 2021-04-27 ENCOUNTER — OFFICE VISIT (OUTPATIENT)
Dept: CARDIOLOGY | Facility: CLINIC | Age: 81
End: 2021-04-27

## 2021-04-27 VITALS
HEART RATE: 84 BPM | BODY MASS INDEX: 35.93 KG/M2 | HEIGHT: 70 IN | OXYGEN SATURATION: 95 % | DIASTOLIC BLOOD PRESSURE: 68 MMHG | SYSTOLIC BLOOD PRESSURE: 142 MMHG | WEIGHT: 251 LBS

## 2021-04-27 DIAGNOSIS — R06.02 SHORTNESS OF BREATH: ICD-10-CM

## 2021-04-27 DIAGNOSIS — R94.39 ABNORMAL NUCLEAR STRESS TEST: Primary | ICD-10-CM

## 2021-04-27 DIAGNOSIS — I51.9 LEFT VENTRICULAR SYSTOLIC DYSFUNCTION: ICD-10-CM

## 2021-04-27 DIAGNOSIS — I10 ESSENTIAL HYPERTENSION: ICD-10-CM

## 2021-04-27 DIAGNOSIS — R06.02 SHORTNESS OF BREATH: Primary | ICD-10-CM

## 2021-04-27 DIAGNOSIS — E78.2 MIXED HYPERLIPIDEMIA: ICD-10-CM

## 2021-04-27 DIAGNOSIS — R94.39 ABNORMAL NUCLEAR STRESS TEST: ICD-10-CM

## 2021-04-27 PROCEDURE — 93000 ELECTROCARDIOGRAM COMPLETE: CPT | Performed by: NURSE PRACTITIONER

## 2021-04-27 PROCEDURE — 99214 OFFICE O/P EST MOD 30 MIN: CPT | Performed by: NURSE PRACTITIONER

## 2021-04-27 RX ORDER — ATORVASTATIN CALCIUM 20 MG/1
20 TABLET, FILM COATED ORAL DAILY
Status: ON HOLD | COMMUNITY
End: 2021-05-05 | Stop reason: SDUPTHER

## 2021-04-27 RX ORDER — SODIUM CHLORIDE 9 MG/ML
75 INJECTION, SOLUTION INTRAVENOUS CONTINUOUS
Status: CANCELLED | OUTPATIENT
Start: 2021-04-27

## 2021-04-27 RX ORDER — DIPHENHYDRAMINE HCL 25 MG
25 CAPSULE ORAL ONCE
Status: CANCELLED | OUTPATIENT
Start: 2021-04-27 | End: 2021-04-27

## 2021-04-27 RX ORDER — CARVEDILOL 3.12 MG/1
3.12 TABLET ORAL 2 TIMES DAILY WITH MEALS
Qty: 60 TABLET | Refills: 11 | Status: SHIPPED | OUTPATIENT
Start: 2021-04-27 | End: 2021-07-23 | Stop reason: SDUPTHER

## 2021-04-27 NOTE — PROGRESS NOTES
Subjective:     Encounter Date:04/27/2021      Patient ID: Misael Mcgill is a 81 y.o. male.    Chief Complaint: Follow-up shortness of breath, mild LV systolic dysfunction, abnormal nuclear stress test    The patient has a history of diabetes and hypertension.  He was referred to Dr. Crawford by Dr. Russo due to shortness of breath.  He established care with Dr. Crawford on 3/4.  At that point, he had just been seen in his PCP office on 3/1 and it was documented that he was concerned regarding his shortness of breath at night for the previous month.    At his initial visit with Dr. Crawford he reported due to having a tooth break off a week prior, he was put on antibiotics and pain medications.  He had been experiencing shortness of breath when lying in bed described as a smothering feeling.  Symptoms did not keep him from sleeping.  He denies shortness of breath with exertion.  He reported snoring.  He had not been checked for sleep apnea.  He reported wheezing.  He denied chest pain, edema or palpitations.  Specifically, he denied any leg swelling after driving to [place].  He related his shortness of breath to taking pain pills, and reported since discontinuing the pain pills he was feeling better.  He had not experienced any weight gain.  At that visit, echo was ordered to investigate his shortness of breath further.  This revealed global hypokinesis with an LVEF of 45%.  See results below.    On 4/13 the patient was contacted and informed that his mild LV systolic dysfunction may be contributing to his orthopnea.  He was instructed to take Lasix 40 mg every day for 5 days to see if this would improve the orthopnea if it was persisting.  Also, nuclear stress test was ordered to investigate his LV systolic dysfunction and 2 to 3-week follow-up was ordered.  Ultimately, his nuclear stress test was abnormal and cardiac cath was recommended.  See full details of stress results below.  However, the patient declined to  pursue cardiac cath at that time.    Today the patient reports he did take Lasix for 5 days as instructed (of note, he states he previously took Lasix 40 mg daily for many years, but this was stopped several weeks ago per his PCP due to his kidney function).  He denies any change in his breathing with or without taking the Lasix.  He tells me today that he only ever had orthopnea for 2 nights, and this had already resolved before taking the Lasix.  He states his weight is stable/essentially unchanged.  He denies edema.  He denies chest pain, abdominal distention, palpitations, PND, syncope or presyncope.  He is somewhat confused regarding medication instructions from his PCP.  He states that when Lasix was discontinued several weeks ago his clonidine dose was doubled.  He reports that his shortness of breath is persistent/constant and unrelated to activity.  He believes the shortness of breath has been present, but perhaps gradually worsening over the past 5 to 6 years.  He reports a decline in his stamina over the past 6 months.      The following portions of the patient's history were reviewed and updated as appropriate: allergies, current medications, past family history, past medical history, past social history, past surgical history and problem list.    Review of Systems   Constitutional: Positive for malaise/fatigue.   Cardiovascular: Negative for chest pain, claudication, dyspnea on exertion, leg swelling, near-syncope, orthopnea, palpitations, paroxysmal nocturnal dyspnea and syncope.   Respiratory: Positive for shortness of breath. Negative for cough.    Hematologic/Lymphatic: Does not bruise/bleed easily.   Musculoskeletal: Negative for falls.   Gastrointestinal: Negative for bloating.   Neurological: Negative for dizziness, light-headedness and weakness.       No Known Allergies    Current Outpatient Medications:   •  allopurinol (ZYLOPRIM) 100 MG tablet, allopurinol 100 mg tablet, Disp: , Rfl:   •   "amLODIPine (NORVASC) 10 MG tablet, amlodipine 10 mg tablet, Disp: , Rfl:   •  aspirin 81 MG EC tablet, Take 81 mg by mouth Daily., Disp: , Rfl:   •  atorvastatin (LIPITOR) 20 MG tablet, Take 20 mg by mouth Daily., Disp: , Rfl:   •  cloNIDine (CATAPRES) 0.1 MG tablet, , Disp: , Rfl:   •  furosemide (LASIX) 40 MG tablet, Take 1 tablet by mouth Daily., Disp: 5 tablet, Rfl: 0  •  indapamide (LOZOL) 1.25 MG tablet, 1.25 mg. Takes 2 tablets daily, Disp: , Rfl:   •  metFORMIN (GLUCOPHAGE) 500 MG tablet, Take 1,000 mg by mouth 2 (Two) Times a Day With Meals., Disp: , Rfl:   •  vitamin C (ASCORBIC ACID) 250 MG tablet, Take 250 mg by mouth., Disp: , Rfl:   •  vitamin E 1000 UNIT capsule, Take 1,000 Units by mouth., Disp: , Rfl:   •  carvedilol (COREG) 3.125 MG tablet, Take 1 tablet by mouth 2 (Two) Times a Day With Meals., Disp: 60 tablet, Rfl: 11         Objective:    /68   Pulse 84   Ht 177.8 cm (70\")   Wt 114 kg (251 lb)   SpO2 95%   BMI 36.01 kg/m²        Vitals and nursing note reviewed.   Constitutional:       General: Not in acute distress.     Appearance: Well-developed and not in distress. Not diaphoretic.   Neck:      Vascular: No JVD.   Pulmonary:      Effort: Pulmonary effort is normal. No respiratory distress.      Breath sounds: Normal breath sounds.   Cardiovascular:      Normal rate. Regular rhythm.      Murmurs: There is no murmur.   Edema:     Peripheral edema absent.   Abdominal:      Tenderness: There is no abdominal tenderness.   Skin:     General: Skin is warm and dry.   Neurological:      Mental Status: Alert and oriented to person, place, and time.         Lab Review:   Lab Results   Component Value Date    CHLPL 205 (H) 03/01/2021    TRIG 230 (H) 03/01/2021    HDL 37 (L) 03/01/2021     03/01/2021     Lab Results   Component Value Date    GLUCOSE 124 (H) 03/01/2021    BUN 25 (H) 03/01/2021    CREATININE 1.4 (H) 03/01/2021    EGFRIFNONA 49 (A) 03/01/2021    EGFRIFAFRI 59 (L) 03/01/2021 "    K 4.3 03/01/2021    CO2 24 03/01/2021    CALCIUM 10.1 03/01/2021    ALBUMIN 4.6 03/01/2021    AST 28 03/01/2021    ALT 37 03/01/2021     Lab Results   Component Value Date    WBC 7.2 03/01/2021    HGB 15.3 03/01/2021    HCT 46.4 03/01/2021    MCV 96.3 (H) 03/01/2021     03/01/2021     Lab Results   Component Value Date    HGBA1C 6.2 (H) 03/01/2021             ECG 12 Lead    Date/Time: 4/27/2021 4:11 PM  Performed by: Dariela Chauhan APRN  Authorized by: Dariela Chauhan APRN   Comparison: compared with previous ECG from 3/4/2021  Similar to previous ECG  Rhythm: sinus rhythm  Ectopy: infrequent PVCs  BPM: 73  Conduction: non-specific intraventricular conduction delay  Other findings: non-specific ST-T wave changes        4/14/2021 nuclear stress :    · Impressions are consistent with a high risk study.  · Myocardial perfusion imaging indicates a large-sized infarct located in the inferior wall. This defect improves with attenuation correction software raising possibility of artifact, but does not completely correct. On attenuation corrected images, perfusion deficit is less severe on post-stress then on rest imaging, raising the possibility of a chronic total occlusion supplied by collaterals that have improved flow after vasodilator therapy administration..  · Left ventricular ejection fraction is mildly reduced. (Calculated EF = 46%).  · There is no prior study available for comparison.         Results for orders placed during the hospital encounter of 04/02/21    Adult Transthoracic Echo Complete w/ Color, Spectral and Contrast if necessary per protocol    Interpretation Summary  · Estimated left ventricular EF = 45% Left ventricular systolic function is mildly decreased. There is global hypokinesis.  · Left ventricular wall thickness is consistent with mild concentric hypertrophy.  · Left ventricular diastolic function is consistent with (grade I) impaired relaxation.  · Left atrial volume is mildly  increased.  · Estimated right ventricular systolic pressure from tricuspid regurgitation is normal (<35 mmHg).  · No significant valvular pathology.      Assessment:      Problem List Items Addressed This Visit        Cardiac and Vasculature    Hypertension    Relevant Medications    carvedilol (COREG) 3.125 MG tablet    Hyperlipidemia    Relevant Medications    atorvastatin (LIPITOR) 20 MG tablet    Abnormal nuclear stress test    Left ventricular systolic dysfunction    Relevant Medications    carvedilol (COREG) 3.125 MG tablet       Pulmonary and Pneumonias    Shortness of breath - Primary          Plan:     1.  Shortness of breath: Established problem, further work-up planned.  He does have mild LV systolic dysfunction.  No evidence of volume overload on exam today.  He denies further orthopnea.  He denies edema or increases in weight.  He denies any improvement in his shortness of breath after taking Lasix for 5 days.  Based on what he describes, I do not currently suspect volume overload as the source of his shortness of breath.  Given his abnormal nuclear stress test, CAD is a consideration in terms of the source of his dyspnea and LV systolic dysfunction.  Deconditioning is a consideration as well.  -Given his persistent shortness of breath which did not improve with diuresis, abnormal nuclear stress test, and LV systolic dysfunction, right and left cardiac cath was again recommended.  The patient reports he would like to proceed.  The procedure and its risks, benefits and alternatives have been discussed with the patient and he is agreeable to proceed.  -Start low-dose beta-blocker for LV systolic dysfunction-Coreg 3.125 mg twice daily.  -Reduce clonidine twice daily to daily, with hopes to wean this off to allow room to uptitrate beta-blocker and add ACE inhibitor; continue current Norvasc and indapamide doses for now-meds may need to be adjusted/discontinued to allow room for medication for his LV  systolic dysfunction in the future.  -Use Lasix only if needed for increased shortness of breath/orthopnea/PND, weight gain of greater than 2 pounds overnight or 5 pounds in 2 days, or significant peripheral edema.  -Heart healthy low-sodium diet  -Daily weights    2.  Essential hypertension: Established problem, stable/borderline elevated.  The patient reports his systolic blood pressures are often in the 140s to 160s.  Adding Coreg today.  See plans above.    3.  Hyperlipidemia: On atorvastatin 20 mg nightly per PCP.  4.  Diabetes mellitus type 2: He reports his Metformin dose was recently increased for hyperglycemia per PCP.    Follow-up to be determined after cardiac cath.

## 2021-04-29 ENCOUNTER — APPOINTMENT (OUTPATIENT)
Dept: CARDIOLOGY | Facility: HOSPITAL | Age: 81
End: 2021-04-29

## 2021-04-29 ENCOUNTER — TRANSCRIBE ORDERS (OUTPATIENT)
Dept: LAB | Facility: HOSPITAL | Age: 81
End: 2021-04-29

## 2021-04-29 DIAGNOSIS — Z01.818 PREOP TESTING: Primary | ICD-10-CM

## 2021-05-03 ENCOUNTER — LAB (OUTPATIENT)
Dept: LAB | Facility: HOSPITAL | Age: 81
End: 2021-05-03

## 2021-05-03 PROCEDURE — U0004 COV-19 TEST NON-CDC HGH THRU: HCPCS | Performed by: INTERNAL MEDICINE

## 2021-05-03 PROCEDURE — C9803 HOPD COVID-19 SPEC COLLECT: HCPCS | Performed by: INTERNAL MEDICINE

## 2021-05-04 LAB — SARS-COV-2 ORF1AB RESP QL NAA+PROBE: NOT DETECTED

## 2021-05-05 ENCOUNTER — HOSPITAL ENCOUNTER (OUTPATIENT)
Facility: HOSPITAL | Age: 81
Setting detail: HOSPITAL OUTPATIENT SURGERY
Discharge: HOME | End: 2021-05-05
Attending: INTERNAL MEDICINE | Admitting: INTERNAL MEDICINE

## 2021-05-05 VITALS
WEIGHT: 251.32 LBS | RESPIRATION RATE: 15 BRPM | DIASTOLIC BLOOD PRESSURE: 49 MMHG | HEART RATE: 64 BPM | OXYGEN SATURATION: 95 % | SYSTOLIC BLOOD PRESSURE: 103 MMHG | BODY MASS INDEX: 35.98 KG/M2 | TEMPERATURE: 98.4 F | HEIGHT: 70 IN

## 2021-05-05 DIAGNOSIS — R94.39 ABNORMAL NUCLEAR STRESS TEST: ICD-10-CM

## 2021-05-05 DIAGNOSIS — R06.02 SHORTNESS OF BREATH: ICD-10-CM

## 2021-05-05 PROBLEM — I25.708 CORONARY ARTERY DISEASE OF BYPASS GRAFT OF NATIVE HEART WITH STABLE ANGINA PECTORIS (HCC): Status: ACTIVE | Noted: 2021-05-05

## 2021-05-05 LAB
ALBUMIN SERPL-MCNC: 4.3 G/DL (ref 3.5–5.2)
ALBUMIN/GLOB SERPL: 1.3 G/DL
ALP SERPL-CCNC: 63 U/L (ref 39–117)
ALT SERPL W P-5'-P-CCNC: 54 U/L (ref 1–41)
ANION GAP SERPL CALCULATED.3IONS-SCNC: 12 MMOL/L (ref 5–15)
AST SERPL-CCNC: 39 U/L (ref 1–40)
BASOPHILS # BLD AUTO: 0.03 10*3/MM3 (ref 0–0.2)
BASOPHILS NFR BLD AUTO: 0.4 % (ref 0–1.5)
BILIRUB SERPL-MCNC: 0.5 MG/DL (ref 0–1.2)
BUN SERPL-MCNC: 27 MG/DL (ref 8–23)
BUN/CREAT SERPL: 20.3 (ref 7–25)
CALCIUM SPEC-SCNC: 9.3 MG/DL (ref 8.6–10.5)
CHLORIDE SERPL-SCNC: 100 MMOL/L (ref 98–107)
CHOLEST SERPL-MCNC: 107 MG/DL (ref 0–200)
CO2 SERPL-SCNC: 24 MMOL/L (ref 22–29)
CREAT SERPL-MCNC: 1.33 MG/DL (ref 0.76–1.27)
DEPRECATED RDW RBC AUTO: 46.5 FL (ref 37–54)
EOSINOPHIL # BLD AUTO: 0.29 10*3/MM3 (ref 0–0.4)
EOSINOPHIL NFR BLD AUTO: 4 % (ref 0.3–6.2)
ERYTHROCYTE [DISTWIDTH] IN BLOOD BY AUTOMATED COUNT: 13.3 % (ref 12.3–15.4)
GFR SERPL CREATININE-BSD FRML MDRD: 51 ML/MIN/1.73
GLOBULIN UR ELPH-MCNC: 3.3 GM/DL
GLUCOSE SERPL-MCNC: 141 MG/DL (ref 65–99)
HCT VFR BLD AUTO: 43.6 % (ref 37.5–51)
HDLC SERPL-MCNC: 41 MG/DL (ref 40–60)
HGB BLD-MCNC: 14.9 G/DL (ref 13–17.7)
IMM GRANULOCYTES # BLD AUTO: 0.07 10*3/MM3 (ref 0–0.05)
IMM GRANULOCYTES NFR BLD AUTO: 1 % (ref 0–0.5)
INR PPP: 1.06 (ref 0.91–1.09)
LDLC SERPL CALC-MCNC: 40 MG/DL (ref 0–100)
LDLC/HDLC SERPL: 0.87 {RATIO}
LYMPHOCYTES # BLD AUTO: 2.04 10*3/MM3 (ref 0.7–3.1)
LYMPHOCYTES NFR BLD AUTO: 27.8 % (ref 19.6–45.3)
MCH RBC QN AUTO: 32.4 PG (ref 26.6–33)
MCHC RBC AUTO-ENTMCNC: 34.2 G/DL (ref 31.5–35.7)
MCV RBC AUTO: 94.8 FL (ref 79–97)
MONOCYTES # BLD AUTO: 0.54 10*3/MM3 (ref 0.1–0.9)
MONOCYTES NFR BLD AUTO: 7.4 % (ref 5–12)
NEUTROPHILS NFR BLD AUTO: 4.37 10*3/MM3 (ref 1.7–7)
NEUTROPHILS NFR BLD AUTO: 59.4 % (ref 42.7–76)
NRBC BLD AUTO-RTO: 0 /100 WBC (ref 0–0.2)
PLATELET # BLD AUTO: 115 10*3/MM3 (ref 140–450)
PMV BLD AUTO: 12.4 FL (ref 6–12)
POTASSIUM SERPL-SCNC: 4.4 MMOL/L (ref 3.5–5.2)
PROT SERPL-MCNC: 7.6 G/DL (ref 6–8.5)
PROTHROMBIN TIME: 13 SECONDS (ref 11.5–13.4)
RBC # BLD AUTO: 4.6 10*6/MM3 (ref 4.14–5.8)
SODIUM SERPL-SCNC: 136 MMOL/L (ref 136–145)
TRIGL SERPL-MCNC: 152 MG/DL (ref 0–150)
VLDLC SERPL-MCNC: 26 MG/DL (ref 5–40)
WBC # BLD AUTO: 7.34 10*3/MM3 (ref 3.4–10.8)

## 2021-05-05 PROCEDURE — C1887 CATHETER, GUIDING: HCPCS | Performed by: INTERNAL MEDICINE

## 2021-05-05 PROCEDURE — C1894 INTRO/SHEATH, NON-LASER: HCPCS | Performed by: INTERNAL MEDICINE

## 2021-05-05 PROCEDURE — 99153 MOD SED SAME PHYS/QHP EA: CPT | Performed by: INTERNAL MEDICINE

## 2021-05-05 PROCEDURE — 25010000002 DIPHENHYDRAMINE PER 50 MG: Performed by: INTERNAL MEDICINE

## 2021-05-05 PROCEDURE — 85610 PROTHROMBIN TIME: CPT | Performed by: NURSE PRACTITIONER

## 2021-05-05 PROCEDURE — 25010000002 HEPARIN (PORCINE) PER 1000 UNITS: Performed by: INTERNAL MEDICINE

## 2021-05-05 PROCEDURE — 80061 LIPID PANEL: CPT | Performed by: NURSE PRACTITIONER

## 2021-05-05 PROCEDURE — C1725 CATH, TRANSLUMIN NON-LASER: HCPCS | Performed by: INTERNAL MEDICINE

## 2021-05-05 PROCEDURE — 80053 COMPREHEN METABOLIC PANEL: CPT | Performed by: NURSE PRACTITIONER

## 2021-05-05 PROCEDURE — 93458 L HRT ARTERY/VENTRICLE ANGIO: CPT | Performed by: INTERNAL MEDICINE

## 2021-05-05 PROCEDURE — C1769 GUIDE WIRE: HCPCS | Performed by: INTERNAL MEDICINE

## 2021-05-05 PROCEDURE — 99152 MOD SED SAME PHYS/QHP 5/>YRS: CPT | Performed by: INTERNAL MEDICINE

## 2021-05-05 PROCEDURE — 85025 COMPLETE CBC W/AUTO DIFF WBC: CPT | Performed by: NURSE PRACTITIONER

## 2021-05-05 PROCEDURE — 25010000002 HEPARIN (PORCINE) 2000-0.9 UNIT/L-% SOLUTION: Performed by: INTERNAL MEDICINE

## 2021-05-05 PROCEDURE — C1760 CLOSURE DEV, VASC: HCPCS | Performed by: INTERNAL MEDICINE

## 2021-05-05 PROCEDURE — 25010000002 FENTANYL CITRATE (PF) 100 MCG/2ML SOLUTION: Performed by: INTERNAL MEDICINE

## 2021-05-05 PROCEDURE — 92920 PRQ TRLUML C ANGIOP 1ART&/BR: CPT | Performed by: INTERNAL MEDICINE

## 2021-05-05 PROCEDURE — 25010000002 BIVALIRUDIN TRIFLUOROACETATE 250 MG RECONSTITUTED SOLUTION: Performed by: INTERNAL MEDICINE

## 2021-05-05 PROCEDURE — 25010000002 MIDAZOLAM HCL (PF) 5 MG/5ML SOLUTION: Performed by: INTERNAL MEDICINE

## 2021-05-05 PROCEDURE — 25010000002 HEPARIN (PORCINE) 1000-0.9 UT/500ML-% SOLUTION: Performed by: INTERNAL MEDICINE

## 2021-05-05 PROCEDURE — 25010000002 IOPAMIDOL 61 % SOLUTION: Performed by: INTERNAL MEDICINE

## 2021-05-05 RX ORDER — DIPHENHYDRAMINE HCL 25 MG
25 CAPSULE ORAL ONCE
Status: DISCONTINUED | OUTPATIENT
Start: 2021-05-05 | End: 2021-05-05 | Stop reason: HOSPADM

## 2021-05-05 RX ORDER — ATORVASTATIN CALCIUM 40 MG/1
40 TABLET, FILM COATED ORAL DAILY
Qty: 30 TABLET | Refills: 11 | Status: SHIPPED | OUTPATIENT
Start: 2021-05-05 | End: 2021-06-28

## 2021-05-05 RX ORDER — DIPHENHYDRAMINE HYDROCHLORIDE 50 MG/ML
INJECTION INTRAMUSCULAR; INTRAVENOUS AS NEEDED
Status: DISCONTINUED | OUTPATIENT
Start: 2021-05-05 | End: 2021-05-05 | Stop reason: HOSPADM

## 2021-05-05 RX ORDER — FENTANYL CITRATE 50 UG/ML
INJECTION, SOLUTION INTRAMUSCULAR; INTRAVENOUS AS NEEDED
Status: DISCONTINUED | OUTPATIENT
Start: 2021-05-05 | End: 2021-05-05 | Stop reason: HOSPADM

## 2021-05-05 RX ORDER — MIDAZOLAM HYDROCHLORIDE 1 MG/ML
INJECTION, SOLUTION INTRAMUSCULAR; INTRAVENOUS AS NEEDED
Status: DISCONTINUED | OUTPATIENT
Start: 2021-05-05 | End: 2021-05-05 | Stop reason: HOSPADM

## 2021-05-05 RX ORDER — HEPARIN SODIUM 200 [USP'U]/100ML
INJECTION, SOLUTION INTRAVENOUS AS NEEDED
Status: DISCONTINUED | OUTPATIENT
Start: 2021-05-05 | End: 2021-05-05 | Stop reason: HOSPADM

## 2021-05-05 RX ORDER — LIDOCAINE HYDROCHLORIDE 20 MG/ML
INJECTION, SOLUTION INFILTRATION; PERINEURAL AS NEEDED
Status: DISCONTINUED | OUTPATIENT
Start: 2021-05-05 | End: 2021-05-05 | Stop reason: HOSPADM

## 2021-05-05 RX ORDER — SODIUM CHLORIDE 9 MG/ML
75 INJECTION, SOLUTION INTRAVENOUS CONTINUOUS
Status: DISCONTINUED | OUTPATIENT
Start: 2021-05-05 | End: 2021-05-05 | Stop reason: HOSPADM

## 2021-05-05 RX ORDER — RANOLAZINE 500 MG/1
500 TABLET, EXTENDED RELEASE ORAL 2 TIMES DAILY
Qty: 60 TABLET | Refills: 11 | Status: SHIPPED | OUTPATIENT
Start: 2021-05-05 | End: 2021-06-29 | Stop reason: SDUPTHER

## 2021-05-12 ENCOUNTER — PREP FOR SURGERY (OUTPATIENT)
Dept: OTHER | Facility: HOSPITAL | Age: 81
End: 2021-05-12

## 2021-05-12 ENCOUNTER — OFFICE VISIT (OUTPATIENT)
Dept: CARDIOLOGY | Facility: CLINIC | Age: 81
End: 2021-05-12

## 2021-05-12 VITALS — WEIGHT: 247 LBS | HEIGHT: 71 IN | BODY MASS INDEX: 34.58 KG/M2

## 2021-05-12 DIAGNOSIS — N18.31 STAGE 3A CHRONIC KIDNEY DISEASE (HCC): ICD-10-CM

## 2021-05-12 DIAGNOSIS — I51.9 LEFT VENTRICULAR SYSTOLIC DYSFUNCTION: ICD-10-CM

## 2021-05-12 DIAGNOSIS — I10 ESSENTIAL HYPERTENSION: ICD-10-CM

## 2021-05-12 DIAGNOSIS — I25.708 CORONARY ARTERY DISEASE OF BYPASS GRAFT OF NATIVE HEART WITH STABLE ANGINA PECTORIS (HCC): Primary | ICD-10-CM

## 2021-05-12 DIAGNOSIS — E78.2 MIXED HYPERLIPIDEMIA: ICD-10-CM

## 2021-05-12 DIAGNOSIS — I25.118 CORONARY ARTERY DISEASE OF NATIVE ARTERY OF NATIVE HEART WITH STABLE ANGINA PECTORIS (HCC): ICD-10-CM

## 2021-05-12 PROCEDURE — 99442 PR PHYS/QHP TELEPHONE EVALUATION 11-20 MIN: CPT | Performed by: INTERNAL MEDICINE

## 2021-05-12 RX ORDER — SODIUM CHLORIDE 9 MG/ML
1-3 INJECTION, SOLUTION INTRAVENOUS CONTINUOUS
Status: CANCELLED | OUTPATIENT
Start: 2021-05-12

## 2021-05-12 RX ORDER — ASPIRIN 81 MG/1
81 TABLET ORAL DAILY
Status: CANCELLED | OUTPATIENT
Start: 2021-05-13

## 2021-05-12 RX ORDER — ASPIRIN 81 MG/1
324 TABLET, CHEWABLE ORAL ONCE
Status: CANCELLED | OUTPATIENT
Start: 2021-05-12 | End: 2021-05-12

## 2021-05-12 RX ORDER — SODIUM CHLORIDE 0.9 % (FLUSH) 0.9 %
3 SYRINGE (ML) INJECTION EVERY 12 HOURS SCHEDULED
Status: CANCELLED | OUTPATIENT
Start: 2021-05-12

## 2021-05-12 RX ORDER — SODIUM CHLORIDE 0.9 % (FLUSH) 0.9 %
10 SYRINGE (ML) INJECTION AS NEEDED
Status: CANCELLED | OUTPATIENT
Start: 2021-05-12

## 2021-05-12 NOTE — PROGRESS NOTES
"     Subjective:     Encounter Date:05/12/2021      Patient ID: Misael Mcgill is a 81 y.o. male.    You have chosen to receive care through a telephone visit. Do you consent to use a telephone visit for your medical care today? Yes      Chief Complaint: f/u cardiac catheterization, shortness of breath  History of Present Illness  81-year-old male returns today for prompt follow-up via televisit after undergoing cardiac catheterization on 5/5/2021.  By way of review, he was initially referred to me due to significant shortness of breath in March 2021.  Work-up included an echocardiogram showing mild LV dysfunction, so a nuclear perfusion stress test which then performed which was abnormal.  For this reason, we performed cardiac catheterization last week, which showed multivessel coronary disease.  The most significant lesion appeared to be a focal 90% stenosis in the mid-RCA that did correspond to the area of most severe ischemia noted on the aforementioned stress test.  There is also moderate to severe stenosis in the proximal circumflex and a severe focal stenosis in the small mid circumflex extending into a small OM1 branch.  Since there was no left main or LAD disease, the benefit of CABG was felt to be negated and I proceeded with attempt at PCI to the mid-RCA.  However, patient does have stage III CKD, and the intervention itself proved quite difficult.  I felt as though he had reached safety margins for total amount of dye that could be given in 1 setting, so had to terminate the procedure without achieving a successful PCI.  Of note, takeoff of the RCA made engagement of the guiding catheter very difficult, particularly in order to be coaxial and achieve enough support to navigate equipment around the first \"hairpin\" turn in the proximal vessel.  I was able to cross the lesion with the wire on multiple occasions, but can never achieve enough support to help deliver any device, including a balloon, but yet " "maintain guide catheter engagement well enough to take pictures to ensure appropriate positioning.  Therefore, angioplasty was never performed and no stent was brought into the vessel.  I did increase his atorvastatin dose now in light of these findings for ischemic event reduction, and added Ranexa 500 mg twice daily for symptom benefit.    Today states that it took him several days to get over that procedure, but overall he is back up and active now.  He states he thinks his breathing is slightly better, but that he still \"wears out\" quite easily and gets severely out of breath with what he perceives to be minimal to moderate activity.  Denies any associated chest discomfort.      The following portions of the patient's history were reviewed and updated as appropriate: allergies, current medications, past family history, past medical history, past social history, past surgical history and problem list.    Review of Systems   Constitutional: Negative for malaise/fatigue.   Cardiovascular: Positive for dyspnea on exertion. Negative for chest pain, claudication, leg swelling, near-syncope, orthopnea, palpitations, paroxysmal nocturnal dyspnea and syncope.   Respiratory: Negative for shortness of breath.    Hematologic/Lymphatic: Does not bruise/bleed easily.           Current Outpatient Medications:   •  allopurinol (ZYLOPRIM) 100 MG tablet, allopurinol 100 mg tablet, Disp: , Rfl:   •  amLODIPine (NORVASC) 10 MG tablet, amlodipine 10 mg tablet, Disp: , Rfl:   •  aspirin 81 MG EC tablet, Take 81 mg by mouth Daily., Disp: , Rfl:   •  atorvastatin (LIPITOR) 40 MG tablet, Take 1 tablet by mouth Daily., Disp: 30 tablet, Rfl: 11  •  carvedilol (COREG) 3.125 MG tablet, Take 1 tablet by mouth 2 (Two) Times a Day With Meals., Disp: 60 tablet, Rfl: 11  •  cloNIDine (CATAPRES) 0.1 MG tablet, , Disp: , Rfl:   •  furosemide (LASIX) 40 MG tablet, Take 1 tablet by mouth Daily., Disp: 5 tablet, Rfl: 0  •  indapamide (LOZOL) 1.25 MG " tablet, 1.25 mg. Takes 2 tablets daily, Disp: , Rfl:   •  metFORMIN (GLUCOPHAGE) 500 MG tablet, Take 1,000 mg by mouth 2 (Two) Times a Day With Meals., Disp: , Rfl:   •  ranolazine (Ranexa) 500 MG 12 hr tablet, Take 1 tablet by mouth 2 (Two) Times a Day., Disp: 60 tablet, Rfl: 11  •  vitamin C (ASCORBIC ACID) 250 MG tablet, Take 250 mg by mouth., Disp: , Rfl:   •  vitamin E 1000 UNIT capsule, Take 1,000 Units by mouth., Disp: , Rfl:        Objective:      There were no vitals filed for this visit.  Physical Exam: Not performed, telephone visit    Lab Review:     Lab Results   Component Value Date    GLUCOSE 141 (H) 05/05/2021    BUN 27 (H) 05/05/2021    CREATININE 1.33 (H) 05/05/2021    EGFRIFNONA 51 (L) 05/05/2021    EGFRIFAFRI 59 (L) 03/01/2021    BCR 20.3 05/05/2021    K 4.4 05/05/2021    CO2 24.0 05/05/2021    CALCIUM 9.3 05/05/2021    ALBUMIN 4.30 05/05/2021    AST 39 05/05/2021    ALT 54 (H) 05/05/2021               Procedures  Reviewed catheterization films    Assessment/Plan:     Problem List Items Addressed This Visit        Cardiac and Vasculature    Hypertension    Hyperlipidemia    Left ventricular systolic dysfunction    Coronary artery disease of bypass graft of native heart with stable angina pectoris (CMS/[facility]) - Primary       Genitourinary and Reproductive     Stage 3a chronic kidney disease (CMS/[facility])            Recommendations/plans:  -Continue current medications  -Plan PCI to the mid-RCA via femoral approach on 5/24/2021.  Will likely begin with an AL-1 or AL2 guiding catheter to provide support down within the right coronary cusp but have enough reach to coaxially engage the superior takeoff of the RCA.    11 minutes spent on telephone visit, 20 minutes spent overall on day of service    Bill Crawford MD  05/12/2021  13:08 CDT

## 2021-05-24 ENCOUNTER — HOSPITAL ENCOUNTER (OUTPATIENT)
Facility: HOSPITAL | Age: 81
Discharge: HOME | End: 2021-05-25
Attending: INTERNAL MEDICINE | Admitting: INTERNAL MEDICINE

## 2021-05-24 DIAGNOSIS — Z95.5 S/P DRUG ELUTING CORONARY STENT PLACEMENT: Primary | ICD-10-CM

## 2021-05-24 DIAGNOSIS — I25.118 CORONARY ARTERY DISEASE OF NATIVE ARTERY OF NATIVE HEART WITH STABLE ANGINA PECTORIS (HCC): ICD-10-CM

## 2021-05-24 PROBLEM — I25.708 CORONARY ARTERY DISEASE OF BYPASS GRAFT OF NATIVE HEART WITH STABLE ANGINA PECTORIS: Status: RESOLVED | Noted: 2021-05-05 | Resolved: 2021-05-24

## 2021-05-24 LAB
ANION GAP SERPL CALCULATED.3IONS-SCNC: 9 MMOL/L (ref 5–15)
BUN SERPL-MCNC: 26 MG/DL (ref 8–23)
BUN/CREAT SERPL: 17.3 (ref 7–25)
CALCIUM SPEC-SCNC: 10.3 MG/DL (ref 8.6–10.5)
CHLORIDE SERPL-SCNC: 103 MMOL/L (ref 98–107)
CO2 SERPL-SCNC: 26 MMOL/L (ref 22–29)
CREAT SERPL-MCNC: 1.5 MG/DL (ref 0.76–1.27)
DEPRECATED RDW RBC AUTO: 46.3 FL (ref 37–54)
ERYTHROCYTE [DISTWIDTH] IN BLOOD BY AUTOMATED COUNT: 13.3 % (ref 12.3–15.4)
GFR SERPL CREATININE-BSD FRML MDRD: 45 ML/MIN/1.73
GLUCOSE SERPL-MCNC: 140 MG/DL (ref 65–99)
HCT VFR BLD AUTO: 42.4 % (ref 37.5–51)
HGB BLD-MCNC: 14.3 G/DL (ref 13–17.7)
MCH RBC QN AUTO: 32.1 PG (ref 26.6–33)
MCHC RBC AUTO-ENTMCNC: 33.7 G/DL (ref 31.5–35.7)
MCV RBC AUTO: 95.1 FL (ref 79–97)
PLATELET # BLD AUTO: 110 10*3/MM3 (ref 140–450)
PMV BLD AUTO: 12.5 FL (ref 6–12)
POTASSIUM SERPL-SCNC: 4.3 MMOL/L (ref 3.5–5.2)
RBC # BLD AUTO: 4.46 10*6/MM3 (ref 4.14–5.8)
SARS-COV-2 RDRP RESP QL NAA+PROBE: NORMAL
SARS-COV-2 RNA PNL SPEC NAA+PROBE: NOT DETECTED
SODIUM SERPL-SCNC: 138 MMOL/L (ref 136–145)
WBC # BLD AUTO: 6.38 10*3/MM3 (ref 3.4–10.8)

## 2021-05-24 PROCEDURE — 63710000001 CARVEDILOL 3.125 MG TABLET: Performed by: INTERNAL MEDICINE

## 2021-05-24 PROCEDURE — 85027 COMPLETE CBC AUTOMATED: CPT | Performed by: INTERNAL MEDICINE

## 2021-05-24 PROCEDURE — C1874 STENT, COATED/COV W/DEL SYS: HCPCS | Performed by: INTERNAL MEDICINE

## 2021-05-24 PROCEDURE — 94799 UNLISTED PULMONARY SVC/PX: CPT

## 2021-05-24 PROCEDURE — 92928 PRQ TCAT PLMT NTRAC ST 1 LES: CPT | Performed by: INTERNAL MEDICINE

## 2021-05-24 PROCEDURE — 63710000001 ATORVASTATIN 40 MG TABLET: Performed by: INTERNAL MEDICINE

## 2021-05-24 PROCEDURE — C1725 CATH, TRANSLUMIN NON-LASER: HCPCS | Performed by: INTERNAL MEDICINE

## 2021-05-24 PROCEDURE — C1769 GUIDE WIRE: HCPCS | Performed by: INTERNAL MEDICINE

## 2021-05-24 PROCEDURE — C1887 CATHETER, GUIDING: HCPCS | Performed by: INTERNAL MEDICINE

## 2021-05-24 PROCEDURE — 25010000002 HEPARIN (PORCINE) PER 1000 UNITS: Performed by: INTERNAL MEDICINE

## 2021-05-24 PROCEDURE — A9270 NON-COVERED ITEM OR SERVICE: HCPCS | Performed by: INTERNAL MEDICINE

## 2021-05-24 PROCEDURE — 25010000002 IOPAMIDOL 61 % SOLUTION: Performed by: INTERNAL MEDICINE

## 2021-05-24 PROCEDURE — C9600 PERC DRUG-EL COR STENT SING: HCPCS | Performed by: INTERNAL MEDICINE

## 2021-05-24 PROCEDURE — C9803 HOPD COVID-19 SPEC COLLECT: HCPCS

## 2021-05-24 PROCEDURE — 99152 MOD SED SAME PHYS/QHP 5/>YRS: CPT | Performed by: INTERNAL MEDICINE

## 2021-05-24 PROCEDURE — 25010000002 HEPARIN (PORCINE) 1000-0.9 UT/500ML-% SOLUTION: Performed by: INTERNAL MEDICINE

## 2021-05-24 PROCEDURE — 63710000001 CLOPIDOGREL 75 MG TABLET: Performed by: INTERNAL MEDICINE

## 2021-05-24 PROCEDURE — C1760 CLOSURE DEV, VASC: HCPCS | Performed by: INTERNAL MEDICINE

## 2021-05-24 PROCEDURE — 25010000002 MIDAZOLAM HCL (PF) 5 MG/5ML SOLUTION: Performed by: INTERNAL MEDICINE

## 2021-05-24 PROCEDURE — 87635 SARS-COV-2 COVID-19 AMP PRB: CPT | Performed by: INTERNAL MEDICINE

## 2021-05-24 PROCEDURE — 25010000002 FENTANYL CITRATE (PF) 50 MCG/ML SOLUTION: Performed by: INTERNAL MEDICINE

## 2021-05-24 PROCEDURE — C1894 INTRO/SHEATH, NON-LASER: HCPCS | Performed by: INTERNAL MEDICINE

## 2021-05-24 PROCEDURE — 25010000002 HEPARIN (PORCINE) 2000-0.9 UNIT/L-% SOLUTION: Performed by: INTERNAL MEDICINE

## 2021-05-24 PROCEDURE — 63710000001 RANOLAZINE 500 MG TABLET SUSTAINED-RELEASE 12 HOUR: Performed by: INTERNAL MEDICINE

## 2021-05-24 PROCEDURE — 80048 BASIC METABOLIC PNL TOTAL CA: CPT | Performed by: INTERNAL MEDICINE

## 2021-05-24 PROCEDURE — 99153 MOD SED SAME PHYS/QHP EA: CPT | Performed by: INTERNAL MEDICINE

## 2021-05-24 PROCEDURE — 85347 COAGULATION TIME ACTIVATED: CPT

## 2021-05-24 PROCEDURE — 25010000002 DIPHENHYDRAMINE PER 50 MG: Performed by: INTERNAL MEDICINE

## 2021-05-24 DEVICE — XIENCE SIERRA™ EVEROLIMUS ELUTING CORONARY STENT SYSTEM 3.25 MM X 15 MM / RAPID-EXCHANGE
Type: IMPLANTABLE DEVICE | Status: FUNCTIONAL
Brand: XIENCE SIERRA™

## 2021-05-24 RX ORDER — CARVEDILOL 3.12 MG/1
3.12 TABLET ORAL 2 TIMES DAILY WITH MEALS
Status: DISCONTINUED | OUTPATIENT
Start: 2021-05-24 | End: 2021-05-25 | Stop reason: HOSPADM

## 2021-05-24 RX ORDER — HEPARIN SODIUM 1000 [USP'U]/ML
INJECTION, SOLUTION INTRAVENOUS; SUBCUTANEOUS AS NEEDED
Status: DISCONTINUED | OUTPATIENT
Start: 2021-05-24 | End: 2021-05-24 | Stop reason: HOSPADM

## 2021-05-24 RX ORDER — ASPIRIN 81 MG/1
81 TABLET ORAL DAILY
Status: DISCONTINUED | OUTPATIENT
Start: 2021-05-25 | End: 2021-05-25 | Stop reason: HOSPADM

## 2021-05-24 RX ORDER — INDAPAMIDE 1.25 MG/1
2.5 TABLET, FILM COATED ORAL DAILY
Status: DISCONTINUED | OUTPATIENT
Start: 2021-05-25 | End: 2021-05-25 | Stop reason: HOSPADM

## 2021-05-24 RX ORDER — SODIUM CHLORIDE 0.9 % (FLUSH) 0.9 %
10 SYRINGE (ML) INJECTION AS NEEDED
Status: DISCONTINUED | OUTPATIENT
Start: 2021-05-24 | End: 2021-05-24 | Stop reason: HOSPADM

## 2021-05-24 RX ORDER — LIDOCAINE HYDROCHLORIDE 20 MG/ML
INJECTION, SOLUTION INFILTRATION; PERINEURAL AS NEEDED
Status: DISCONTINUED | OUTPATIENT
Start: 2021-05-24 | End: 2021-05-24 | Stop reason: HOSPADM

## 2021-05-24 RX ORDER — FENTANYL CITRATE 50 UG/ML
INJECTION, SOLUTION INTRAMUSCULAR; INTRAVENOUS AS NEEDED
Status: DISCONTINUED | OUTPATIENT
Start: 2021-05-24 | End: 2021-05-24 | Stop reason: HOSPADM

## 2021-05-24 RX ORDER — ASPIRIN 81 MG/1
81 TABLET ORAL DAILY
Status: DISCONTINUED | OUTPATIENT
Start: 2021-05-25 | End: 2021-05-24 | Stop reason: SDUPTHER

## 2021-05-24 RX ORDER — DIPHENHYDRAMINE HYDROCHLORIDE 50 MG/ML
INJECTION INTRAMUSCULAR; INTRAVENOUS AS NEEDED
Status: DISCONTINUED | OUTPATIENT
Start: 2021-05-24 | End: 2021-05-24 | Stop reason: HOSPADM

## 2021-05-24 RX ORDER — SODIUM CHLORIDE 9 MG/ML
125 INJECTION, SOLUTION INTRAVENOUS CONTINUOUS
Status: DISPENSED | OUTPATIENT
Start: 2021-05-24 | End: 2021-05-24

## 2021-05-24 RX ORDER — SODIUM CHLORIDE 0.9 % (FLUSH) 0.9 %
3 SYRINGE (ML) INJECTION EVERY 12 HOURS SCHEDULED
Status: DISCONTINUED | OUTPATIENT
Start: 2021-05-24 | End: 2021-05-24 | Stop reason: HOSPADM

## 2021-05-24 RX ORDER — CLOPIDOGREL BISULFATE 75 MG/1
600 TABLET ORAL ONCE
Status: COMPLETED | OUTPATIENT
Start: 2021-05-24 | End: 2021-05-24

## 2021-05-24 RX ORDER — ASPIRIN 81 MG/1
324 TABLET, CHEWABLE ORAL ONCE
Status: COMPLETED | OUTPATIENT
Start: 2021-05-24 | End: 2021-05-24

## 2021-05-24 RX ORDER — RANOLAZINE 500 MG/1
500 TABLET, EXTENDED RELEASE ORAL 2 TIMES DAILY
Status: DISCONTINUED | OUTPATIENT
Start: 2021-05-24 | End: 2021-05-25 | Stop reason: HOSPADM

## 2021-05-24 RX ORDER — AMLODIPINE BESYLATE 10 MG/1
10 TABLET ORAL
Status: DISCONTINUED | OUTPATIENT
Start: 2021-05-25 | End: 2021-05-25 | Stop reason: HOSPADM

## 2021-05-24 RX ORDER — CLOPIDOGREL BISULFATE 75 MG/1
75 TABLET ORAL DAILY
Status: DISCONTINUED | OUTPATIENT
Start: 2021-05-25 | End: 2021-05-25 | Stop reason: HOSPADM

## 2021-05-24 RX ORDER — ACETAMINOPHEN 325 MG/1
650 TABLET ORAL EVERY 4 HOURS PRN
Status: DISCONTINUED | OUTPATIENT
Start: 2021-05-24 | End: 2021-05-25 | Stop reason: HOSPADM

## 2021-05-24 RX ORDER — HEPARIN SODIUM 200 [USP'U]/100ML
INJECTION, SOLUTION INTRAVENOUS AS NEEDED
Status: DISCONTINUED | OUTPATIENT
Start: 2021-05-24 | End: 2021-05-24 | Stop reason: HOSPADM

## 2021-05-24 RX ORDER — ATORVASTATIN CALCIUM 40 MG/1
40 TABLET, FILM COATED ORAL NIGHTLY
Status: DISCONTINUED | OUTPATIENT
Start: 2021-05-24 | End: 2021-05-25 | Stop reason: HOSPADM

## 2021-05-24 RX ORDER — ALLOPURINOL 100 MG/1
100 TABLET ORAL DAILY
Status: DISCONTINUED | OUTPATIENT
Start: 2021-05-25 | End: 2021-05-25 | Stop reason: HOSPADM

## 2021-05-24 RX ORDER — SODIUM CHLORIDE 9 MG/ML
1-3 INJECTION, SOLUTION INTRAVENOUS CONTINUOUS
Status: DISCONTINUED | OUTPATIENT
Start: 2021-05-24 | End: 2021-05-25 | Stop reason: HOSPADM

## 2021-05-24 RX ORDER — ASPIRIN 81 MG/1
TABLET, CHEWABLE ORAL
Status: COMPLETED
Start: 2021-05-24 | End: 2021-05-24

## 2021-05-24 RX ORDER — MIDAZOLAM HYDROCHLORIDE 1 MG/ML
INJECTION, SOLUTION INTRAMUSCULAR; INTRAVENOUS AS NEEDED
Status: DISCONTINUED | OUTPATIENT
Start: 2021-05-24 | End: 2021-05-24 | Stop reason: HOSPADM

## 2021-05-24 RX ADMIN — CARVEDILOL 3.12 MG: 3.12 TABLET, FILM COATED ORAL at 18:36

## 2021-05-24 RX ADMIN — SODIUM CHLORIDE, PRESERVATIVE FREE 3 ML: 5 INJECTION INTRAVENOUS at 07:40

## 2021-05-24 RX ADMIN — ASPIRIN 243 MG: 81 TABLET, CHEWABLE ORAL at 07:40

## 2021-05-24 RX ADMIN — CLOPIDOGREL 600 MG: 75 TABLET, FILM COATED ORAL at 18:36

## 2021-05-24 RX ADMIN — RANOLAZINE 500 MG: 500 TABLET, FILM COATED, EXTENDED RELEASE ORAL at 20:23

## 2021-05-24 RX ADMIN — SODIUM CHLORIDE 1.12 ML/KG/HR: 9 INJECTION, SOLUTION INTRAVENOUS at 07:42

## 2021-05-24 RX ADMIN — TICAGRELOR 180 MG: 90 TABLET ORAL at 07:41

## 2021-05-24 RX ADMIN — ATORVASTATIN CALCIUM 40 MG: 40 TABLET, FILM COATED ORAL at 20:23

## 2021-05-24 NOTE — INTERVAL H&P NOTE
H&P reviewed. The patient was examined and there are no changes to the H&P.      I discussed cardiac catheterization/PCI, the procedure, risks (including bleeding, infection, vascular damage [including minor oozing, bruising, bleeding, and up to and including the need for vascular surgery], contrast reaction, renal failure, respiratory failure, heart attack, stroke, arrhythmia and even death), benefits, and alternatives and the patient has voiced understanding and is willing to proceed.

## 2021-05-24 NOTE — PLAN OF CARE
Problem: Adult Inpatient Plan of Care  Goal: Plan of Care Review  Outcome: Ongoing, Not Progressing  Flowsheets (Taken 5/24/2021 [id number])  Progress: no change  Plan of Care Reviewed With: patient  Outcome Summary: Recieved from Cath LAb  Goal: Patient-Specific Goal (Individualized)  Outcome: Ongoing, Not Progressing  Goal: Absence of Hospital-Acquired Illness or Injury  Outcome: Ongoing, Not Progressing  Goal: Optimal Comfort and Wellbeing  Outcome: Ongoing, Not Progressing  Goal: Readiness for Transition of Care  Outcome: Ongoing, Not Progressing   Goal Outcome Evaluation:  Plan of Care Reviewed With: patient  Progress: no change  Outcome Summary: Recieved from Cath LAb

## 2021-05-25 ENCOUNTER — TELEPHONE (OUTPATIENT)
Dept: GASTROENTEROLOGY | Facility: CLINIC | Age: 81
End: 2021-05-25

## 2021-05-25 VITALS
HEART RATE: 72 BPM | BODY MASS INDEX: 35.24 KG/M2 | OXYGEN SATURATION: 95 % | SYSTOLIC BLOOD PRESSURE: 106 MMHG | WEIGHT: 246.13 LBS | DIASTOLIC BLOOD PRESSURE: 61 MMHG | RESPIRATION RATE: 18 BRPM | TEMPERATURE: 97 F | HEIGHT: 70 IN

## 2021-05-25 DIAGNOSIS — Z95.5 S/P DRUG ELUTING CORONARY STENT PLACEMENT: Primary | ICD-10-CM

## 2021-05-25 LAB
ANION GAP SERPL CALCULATED.3IONS-SCNC: 12 MMOL/L (ref 5–15)
BUN SERPL-MCNC: 27 MG/DL (ref 8–23)
BUN/CREAT SERPL: 17.2 (ref 7–25)
CALCIUM SPEC-SCNC: 9.5 MG/DL (ref 8.6–10.5)
CHLORIDE SERPL-SCNC: 100 MMOL/L (ref 98–107)
CO2 SERPL-SCNC: 26 MMOL/L (ref 22–29)
CREAT SERPL-MCNC: 1.57 MG/DL (ref 0.76–1.27)
DEPRECATED RDW RBC AUTO: 46.3 FL (ref 37–54)
ERYTHROCYTE [DISTWIDTH] IN BLOOD BY AUTOMATED COUNT: 13.3 % (ref 12.3–15.4)
GFR SERPL CREATININE-BSD FRML MDRD: 43 ML/MIN/1.73
GLUCOSE SERPL-MCNC: 115 MG/DL (ref 65–99)
HCT VFR BLD AUTO: 36.1 % (ref 37.5–51)
HGB BLD-MCNC: 12.4 G/DL (ref 13–17.7)
MCH RBC QN AUTO: 32.5 PG (ref 26.6–33)
MCHC RBC AUTO-ENTMCNC: 34.3 G/DL (ref 31.5–35.7)
MCV RBC AUTO: 94.5 FL (ref 79–97)
PLATELET # BLD AUTO: 93 10*3/MM3 (ref 140–450)
PMV BLD AUTO: 13.4 FL (ref 6–12)
POTASSIUM SERPL-SCNC: 3.8 MMOL/L (ref 3.5–5.2)
RBC # BLD AUTO: 3.82 10*6/MM3 (ref 4.14–5.8)
SODIUM SERPL-SCNC: 138 MMOL/L (ref 136–145)
WBC # BLD AUTO: 5.85 10*3/MM3 (ref 3.4–10.8)

## 2021-05-25 PROCEDURE — 63710000001 AMLODIPINE 10 MG TABLET: Performed by: INTERNAL MEDICINE

## 2021-05-25 PROCEDURE — 85027 COMPLETE CBC AUTOMATED: CPT | Performed by: INTERNAL MEDICINE

## 2021-05-25 PROCEDURE — 94799 UNLISTED PULMONARY SVC/PX: CPT

## 2021-05-25 PROCEDURE — A9270 NON-COVERED ITEM OR SERVICE: HCPCS | Performed by: INTERNAL MEDICINE

## 2021-05-25 PROCEDURE — 99217 PR OBSERVATION CARE DISCHARGE MANAGEMENT: CPT | Performed by: NURSE PRACTITIONER

## 2021-05-25 PROCEDURE — 63710000001 RANOLAZINE 500 MG TABLET SUSTAINED-RELEASE 12 HOUR: Performed by: INTERNAL MEDICINE

## 2021-05-25 PROCEDURE — 63710000001 INDAPAMIDE 1.25 MG TABLET: Performed by: INTERNAL MEDICINE

## 2021-05-25 PROCEDURE — 63710000001 ASPIRIN 81 MG TABLET DELAYED-RELEASE: Performed by: INTERNAL MEDICINE

## 2021-05-25 PROCEDURE — 63710000001 CARVEDILOL 3.125 MG TABLET: Performed by: INTERNAL MEDICINE

## 2021-05-25 PROCEDURE — 63710000001 ALLOPURINOL 100 MG TABLET: Performed by: INTERNAL MEDICINE

## 2021-05-25 PROCEDURE — 80048 BASIC METABOLIC PNL TOTAL CA: CPT | Performed by: INTERNAL MEDICINE

## 2021-05-25 RX ORDER — CLOPIDOGREL BISULFATE 75 MG/1
75 TABLET ORAL DAILY
Qty: 90 TABLET | Refills: 3 | Status: SHIPPED | OUTPATIENT
Start: 2021-05-25 | End: 2021-05-25

## 2021-05-25 RX ORDER — CLOPIDOGREL BISULFATE 75 MG/1
75 TABLET ORAL DAILY
Qty: 30 TABLET | Refills: 11 | Status: SHIPPED | OUTPATIENT
Start: 2021-05-25 | End: 2021-05-25

## 2021-05-25 RX ORDER — NITROGLYCERIN 0.4 MG/1
TABLET SUBLINGUAL
Qty: 25 TABLET | Refills: 1 | Status: SHIPPED | OUTPATIENT
Start: 2021-05-25

## 2021-05-25 RX ORDER — CLOPIDOGREL BISULFATE 75 MG/1
75 TABLET ORAL DAILY
Qty: 90 TABLET | Refills: 3 | Status: SHIPPED | OUTPATIENT
Start: 2021-05-25 | End: 2022-07-28 | Stop reason: SDUPTHER

## 2021-05-25 RX ADMIN — RANOLAZINE 500 MG: 500 TABLET, FILM COATED, EXTENDED RELEASE ORAL at 08:07

## 2021-05-25 RX ADMIN — INDAPAMIDE 2.5 MG: 1.25 TABLET, FILM COATED ORAL at 08:07

## 2021-05-25 RX ADMIN — AMLODIPINE BESYLATE 10 MG: 10 TABLET ORAL at 08:07

## 2021-05-25 RX ADMIN — CARVEDILOL 3.12 MG: 3.12 TABLET, FILM COATED ORAL at 08:07

## 2021-05-25 RX ADMIN — ASPIRIN 81 MG: 81 TABLET, COATED ORAL at 08:06

## 2021-05-25 RX ADMIN — ALLOPURINOL 100 MG: 100 TABLET ORAL at 08:07

## 2021-05-25 NOTE — DISCHARGE SUMMARY
"[facility] HEART [facility] DISCHARGE    Date of Discharge:  5/25/2021    Discharge Diagnosis:   -Stable angina / CCS III on OMT with known coronary artery disease-severe RCA stenosis status post successful PCI to the mid RCA with a 3.5 x 15 mm drug-eluting stent on 5/24/2021.  (Also has known moderate to severe stenosis in the proximal circumflex, and a severe focal stenosis in a small mid circumflex extending into OM1 noted on initial cath on 5/5)    -Essential hypertension-controlled  -Mixed hyperlipidemia-most recent LDL 40-on high intensity statin  -Left ventricular systolic dysfunction- no s/s of acute CHF.  LVEF 45% by echo, 40 to 45% by previous ventriculogram  -Chronic kidney disease stage III    Presenting Problem/History of Present Illness  Coronary artery disease of native artery of native heart with stable angina pectoris (CMS/HCC) [I25.118]    Per HPI by Dr. Crawford on 5/12/2021 telephone visit: \" 81-year-old male returns today for prompt follow-up via televisit after undergoing cardiac catheterization on 5/5/2021.  By way of review, he was initially referred to me due to significant shortness of breath in March 2021.  Work-up included an echocardiogram showing mild LV dysfunction, so a nuclear perfusion stress test which then performed which was abnormal.  For this reason, we performed cardiac catheterization last week, which showed multivessel coronary disease.  The most significant lesion appeared to be a focal 90% stenosis in the mid-RCA that did correspond to the area of most severe ischemia noted on the aforementioned stress test.  There is also moderate to severe stenosis in the proximal circumflex and a severe focal stenosis in the small mid circumflex extending into a small OM1 branch.  Since there was no left main or LAD disease, the benefit of CABG was felt to be negated and I proceeded with attempt at PCI to the mid-RCA.  However, patient does have stage III CKD, and the intervention " "itself proved quite difficult.  I felt as though he had reached safety margins for total amount of dye that could be given in 1 setting, so had to terminate the procedure without achieving a successful PCI.  Of note, takeoff of the RCA made engagement of the guiding catheter very difficult, particularly in order to be coaxial and achieve enough support to navigate equipment around the first \"hairpin\" turn in the proximal vessel.  I was able to cross the lesion with the wire on multiple occasions, but can never achieve enough support to help deliver any device, including a balloon, but yet maintain guide catheter engagement well enough to take pictures to ensure appropriate positioning.  Therefore, angioplasty was never performed and no stent was brought into the vessel.  I did increase his atorvastatin dose now in light of these findings for ischemic event reduction, and added Ranexa 500 mg twice daily for symptom benefit.     Today states that it took him several days to get over that procedure, but overall he is back up and active now.  He states he thinks his breathing is slightly better, but that he still \"wears out\" quite easily and gets severely out of breath with what he perceives to be minimal to moderate activity.  Denies any associated chest discomfort.\"    At that visit, no medication changes were made and he was scheduled for PCI to the mid RCA for 5/24 given his persistent symptoms on optimal medical therapy.    Hospital Course  Patient is a 81 y.o. male presented for PCI reasons described above.  Yesterday, on 5/24/2021 he underwent successful PCI to the mid RCA using a 3.5 x 15 mm drug-eluting stent.  Post procedure, the patient has done well.  He did receive a loading dose of Brilinta as well as Plavix (received Brilinta loading dose yesterday morning, and Plavix loading dose yesterday evening).  He has been transitioned to Plavix 75 mg daily to start today for a minimum of 1 year from PCI.  He will " "continue aspirin 81 mg daily indefinitely.  He reports he feels \"great\" overall but his shortness of breath at rest is unchanged following PCI.  He continues to deny ever having any chest pain.  There no signs of acute CHF or volume overload.  He is felt to be stable for discharge on medical therapy listed below.  He will begin cardiac rehab in 2 weeks.  He will follow-up with his PCP in 1 to 2 weeks with CBC and BMP at that time.  Creatinine has increased slightly compared to yesterday but is stable overall.  Hemoglobin has dropped approximately 2 points to 12.4 and platelets are down to 93 today, compared to 110 yesterday.  He does have at least a moderate amount of ecchymosis surrounding his access site, but no signs of active bleeding.    Procedures Performed  Procedure(s):  Percutaneous Coronary Intervention - RCA Femoral approach  05/24 1006 Note By: Bill Crawford MD     Indication: known severe mid-RCA stenosis identified on prior cath (done after nuclear stress test showed high risk for inferior ischemia), CCS III angina on OMT,   Premedication: Versed, Fentanyl  Contrast: Isovue 300- 150 ml to patient  Radiation: Flouro time= 10:36. Air Kerma= 308.17 mGy  Guiding catheter: 6 Croatian JR4  PCI Hardware: .014\" Whisper ES guidewire,  3.0 x 8 balloon, 3.5 x 15 drug eluting stent     Procedural details:  The patient was brought to the cath lab and prepped and draped in the usual fashion. A Seldinger technique was used to place a 6 Fr sheath in the left common femoral artery, guided by fluoroscopic landmarks.  IV heparin was given to achieve full anticoagulation, as assessed by ACT.  Relying upon experience during previous attempted PCI to this vessel several weeks ago, initially tried to engage the RCA with an AR-2.  However, this could not be advanced down within the right coronary cusp, I then decided to take a JR4 guiding catheter, knowing that it would not provide full coaxial engagement, but should allow " "entry into the right coronary cusp.  This was able to approximate the right coronary artery and allow for angiography to confirm the severe mid RCA stenosis.  Lesion was a type C lesion, less than 20 mm in length, with ROSE MARIE-2 flow distally.  The whisper ES guidewire was then advanced beyond the lesion without difficulty.  Because of the large \"piedra's crook\" proximally, 6 Tunisian guide liner was advanced over this wire and out beyond the piedra's crook into the mid vessel.  Lesion was predilated with a 3.0x8  mm balloon to nominal pressure, with resultant 70% stenosis and no complication. Then a 3.5x15 mm Xience drug-eluting stent was deployed above nominal pressure without complication.  Final angiography after removal of all equipment showed no evidence of edge dissection, distal embolization, perforation, or other side branch compromise.     Femoral angiography had been performed at the onset of the case, showing the arteriotomy itself to be suitable for closure device deployment.  A 6 Tunisian Angio-Seal was deployed, but failed.  It was thus removed from the body and direct manual pressure applied for 20 minutes, achieving adequate hemostasis.  Leg immobilizer was applied as patient was transferred back to the [facility].  No other immediate complications were noted.     I supervised the administration of conscious sedation by nursing staff throughout the case.  First dose was given at 0854 and the end of my face-to-face encounter was at 1002, accounting for a total of 68 minutes of supervision.  During the case, continuous pulse oximetry, heart rate, blood pressure, and patient status were monitored.         Findings:     Hemodynamics:  Ao= 109/61, m78     Lesion characteristics  Pre-PCI: Mid-right coronary artery with a focal, eccentric 90% stenosis (type C), ROSE MARIE-2 flow distally  Post-PCI: Residual stenosis 0 %, ROSE MARIE-3 flow distally     Impression:  1.  Successful PCI to the mid-right coronary artery using a 3.5 " x 15 mm drug-eluting stent    Lab Results (last 72 hours)     Procedure Component Value Units Date/Time    Basic Metabolic Panel [[id number]]  (Abnormal) Collected: 05/25/21 0506    Specimen: Blood Updated: 05/25/21 0621     Glucose 115 mg/dL      BUN 27 mg/dL      Creatinine 1.57 mg/dL      Sodium 138 mmol/L      Potassium 3.8 mmol/L      Chloride 100 mmol/L      CO2 26.0 mmol/L      Calcium 9.5 mg/dL      eGFR Non African Amer 43 mL/min/1.73      BUN/Creatinine Ratio 17.2     Anion Gap 12.0 mmol/L     Narrative:      GFR Normal >60  Chronic Kidney Disease <60  Kidney Failure <15      CBC (No Diff) [[id number]]  (Abnormal) Collected: 05/25/21 0506    Specimen: Blood Updated: 05/25/21 0601     WBC 5.85 10*3/mm3      RBC 3.82 10*6/mm3      Hemoglobin 12.4 g/dL      Hematocrit 36.1 %      MCV 94.5 fL      MCH 32.5 pg      MCHC 34.3 g/dL      RDW 13.3 %      RDW-SD 46.3 fl      MPV 13.4 fL      Platelets 93 10*3/mm3     COVID PRE-OP / PRE-PROCEDURE SCREENING ORDER (NO ISOLATION) - Swab, Nasal Cavity [[id number]]  (Normal) Collected: 05/24/21 1136    Specimen: Swab from Nasal Cavity Updated: 05/24/21 1216    Narrative:      The following orders were created for panel order COVID PRE-OP / PRE-PROCEDURE SCREENING ORDER (NO ISOLATION) - Swab, Nasal Cavity.  Procedure                               Abnormality         Status                     ---------                               -----------         ------                     COVID-19,[facility] IN-DOMINIQUE...[[id number]]  Normal              Final result                 Please view results for these tests on the individual orders.    COVID-19,[facility] IN-HOUSE,Nasal Swab No Transport Media 3-4 HR TAT - Swab, Nasal Cavity [[id number]]  (Normal) Collected: 05/24/21 1136    Specimen: Swab from Nasal Cavity Updated: 05/24/21 1216     COVID19 Not Detected    Narrative:      Fact sheet for providers: https://www.fda.gov/media/255620/download     Fact sheet for patients:  https://www.fda.gov/media/633294/download    Test performed by PCR.    Consider negative results in combination with clinical observations, patient history, and epidemiological information.    COVID-19, [facility] IN-HOUSE,NASAL Swab (NO TRANSPORT MEDIA) 2 HR TAT - Swab, Nasopharynx [[id number]]  (Normal) Collected: 05/24/21 1034    Specimen: Swab from Nasopharynx Updated: 05/24/21 1109     COVID19 Invalid    Narrative:      Invalid x 2; Perform different test method. Test credited  Fact sheet for providers: https://www.fda.gov/media/178017/download     Fact sheet for patients: https://www.fda.gov/media/511300/download    Test performed by PCR.    Basic Metabolic Panel [[id number]]  (Abnormal) Collected: 05/24/21 0713    Specimen: Blood Updated: 05/24/21 0750     Glucose 140 mg/dL      BUN 26 mg/dL      Creatinine 1.50 mg/dL      Sodium 138 mmol/L      Potassium 4.3 mmol/L      Chloride 103 mmol/L      CO2 26.0 mmol/L      Calcium 10.3 mg/dL      eGFR Non African Amer 45 mL/min/1.73      BUN/Creatinine Ratio 17.3     Anion Gap 9.0 mmol/L     Narrative:      GFR Normal >60  Chronic Kidney Disease <60  Kidney Failure <15      CBC (No Diff) [[id number]]  (Abnormal) Collected: 05/24/21 0713    Specimen: Blood Updated: 05/24/21 0728     WBC 6.38 10*3/mm3      RBC 4.46 10*6/mm3      Hemoglobin 14.3 g/dL      Hematocrit 42.4 %      MCV 95.1 fL      MCH 32.1 pg      MCHC 33.7 g/dL      RDW 13.3 %      RDW-SD 46.3 fl      MPV 12.5 fL      Platelets 110 10*3/mm3           Ejection Fraction  Lab Results   Component Value Date    EF 46 04/14/2021       Echo EF Estimated  Lab Results   Component Value Date    ECHOEFEST 45 04/02/2021         Condition on Discharge:  Stable     Physical Exam at Discharge  General : alert and oriented   Card: RRR ; normal sinus rhythm on telemetry with heart rates mostly in the 60s and 70s with occasional PVCs  Resp: CTA  Extrem: no edema, PPP, left femoral artery access site clean, dry.  No drainage or  warmth.  Soft to palpation.  No bruit to auscultation.  He does have at least a moderate amount of ecchymosis surrounding the access site extending over the suprapubic area.     Vital Signs  Temp:  [97 °F (36.1 °C)-98.1 °F (36.7 °C)] 97 °F (36.1 °C)  Heart Rate:  [56-77] 72  Resp:  [12-22] 18  BP: (102-157)/(52-98) 106/61    Discharge Disposition  Home or Self Care    Discharge Medications     Discharge Medications      New Medications      Instructions Start Date   clopidogrel 75 MG tablet  Commonly known as: PLAVIX   75 mg, Oral, Daily      nitroglycerin 0.4 MG SL tablet  Commonly known as: NITROSTAT   1 under the tongue as needed for angina, may repeat q5mins for up three doses         Changes to Medications      Instructions Start Date   metFORMIN 500 MG tablet  Commonly known as: GLUCOPHAGE  What changed: additional instructions   1,000 mg, Oral, 2 Times Daily With Meals, HOLD FOR 48 HRS POST CATH         Continue These Medications      Instructions Start Date   allopurinol 100 MG tablet  Commonly known as: ZYLOPRIM   allopurinol 100 mg tablet      amLODIPine 10 MG tablet  Commonly known as: NORVASC   amlodipine 10 mg tablet      aspirin 81 MG EC tablet   81 mg, Oral, Daily      atorvastatin 40 MG tablet  Commonly known as: LIPITOR   40 mg, Oral, Daily      carvedilol 3.125 MG tablet  Commonly known as: COREG   3.125 mg, Oral, 2 Times Daily With Meals      cloNIDine 0.1 MG tablet  Commonly known as: CATAPRES   0.1 mg, Daily      indapamide 1.25 MG tablet  Commonly known as: LOZOL   1.25 mg, 2 times daily      ranolazine 500 MG 12 hr tablet  Commonly known as: Ranexa   500 mg, Oral, 2 Times Daily      vitamin C 250 MG tablet  Commonly known as: ASCORBIC ACID   250 mg, Oral      vitamin E 1000 UNIT capsule   1,000 Units, Oral             Discharge Diet:  Heart healthy, low-sodium diabetic diet    Activity at Discharge:    No heavy lifting for 5-7 days greater than 10 pounds.  No heavy or strenuous pushing or  pulling.  No tub baths, hot tubs, swimming pools, or submerging groin underwater for 1 week.  Wash groin site daily with antibacterial soap and water. Rinse and keep clean and dry.  No lotions, powders, or topical ointments to site. Keep open to air and dry.  Call our office with any concerns or if you notice redness, swelling, drainage, warmth, or pain at the site.    Follow-up Appointments  Dr. Russo 1-2 weeks with CBC and BMP in 1 week  Dr. Crawford or myself in 4 to 6 weeks    Future Appointments   Date Time Provider Department Center   6/28/2021  8:15 AM Dariela Chauhan APRN [facility]     Additional Instructions for the Follow-ups that You Need to Schedule     Ambulatory Referral to Cardiac Rehab   As directed            Electronically signed by Dariela BYRD. EPIFANIO Chauhan, 05/25/21, 9:11 AM CDT.     Time: 45 minutes

## 2021-05-25 NOTE — PLAN OF CARE
Goal Outcome Evaluation:  Plan of Care Reviewed With: patient  Progress: no change  Outcome Summary: pt denies pain this shift. VSS. S 66-78. Cath site is clean and dry with no bleeding. no signs of hematoma. bruising around incision. alert and oriented. room air. possible d/c today. no fall. safety maintained.

## 2021-05-26 ENCOUNTER — TELEPHONE (OUTPATIENT)
Dept: CARDIOLOGY | Facility: CLINIC | Age: 81
End: 2021-05-26

## 2021-05-26 ENCOUNTER — TELEPHONE (OUTPATIENT)
Dept: PRIMARY CARE CLINIC | Age: 81
End: 2021-05-26

## 2021-05-26 NOTE — TELEPHONE ENCOUNTER
Patient called the office today to report he is experiencing severe leg aching and cramping.  He feels this is related to his Lipitor 40 mg he is taking daily.  Please advise.

## 2021-05-26 NOTE — TELEPHONE ENCOUNTER
Pt states is was on Lipitor 10 mg. Dr. Lim Call increased to 40 mg. Pt states his legs are in terrible pain. He states he will also call Dr. Lim Call office.

## 2021-05-26 NOTE — TELEPHONE ENCOUNTER
Spoke with patient and explained Dr. Crawford's response.  He is agreeable to this plan and will call me back after seeing Dr. Russo.

## 2021-05-26 NOTE — TELEPHONE ENCOUNTER
Per Dr. Crawford-    His dose was increased from 20 up to 40 about 3 weeks ago, so it is conceivable that he is having myalgias from this.  However, I also find it rather coincidental that he was literally just in the hospital for PCI 2 days ago, went home yesterday, and did not mention this.  He was complaining of unilateral left leg pain below the knee, but not a bilateral cramping and did not sound like myalgias to me. I would advise him to continue atorva 40 for now and discuss this further with Dr. Russo when he sees her next week.  If it is truly still bilateral leg cramps that are consistent with myalgias, then she may advise him to reduce the dose back to the 20 mg that he had previously tolerated.     Left voicemail asking patient to call me back to further discuss.

## 2021-05-27 ENCOUNTER — OFFICE VISIT (OUTPATIENT)
Dept: PRIMARY CARE CLINIC | Age: 81
End: 2021-05-27
Payer: MEDICARE

## 2021-05-27 VITALS
HEIGHT: 70 IN | BODY MASS INDEX: 35.76 KG/M2 | OXYGEN SATURATION: 98 % | WEIGHT: 249.8 LBS | SYSTOLIC BLOOD PRESSURE: 120 MMHG | TEMPERATURE: 97.7 F | HEART RATE: 78 BPM | DIASTOLIC BLOOD PRESSURE: 78 MMHG

## 2021-05-27 DIAGNOSIS — M79.89 PAIN AND SWELLING OF LOWER EXTREMITY, UNSPECIFIED LATERALITY: Primary | ICD-10-CM

## 2021-05-27 DIAGNOSIS — M79.604 PAIN IN BOTH LOWER EXTREMITIES: ICD-10-CM

## 2021-05-27 DIAGNOSIS — M79.605 PAIN IN BOTH LOWER EXTREMITIES: ICD-10-CM

## 2021-05-27 DIAGNOSIS — D64.9 ANEMIA, UNSPECIFIED TYPE: ICD-10-CM

## 2021-05-27 DIAGNOSIS — M79.606 PAIN AND SWELLING OF LOWER EXTREMITY, UNSPECIFIED LATERALITY: Primary | ICD-10-CM

## 2021-05-27 DIAGNOSIS — N28.9 RENAL INSUFFICIENCY: ICD-10-CM

## 2021-05-27 LAB
ALBUMIN SERPL-MCNC: 4.1 G/DL (ref 3.5–5.2)
ALP BLD-CCNC: 77 U/L (ref 40–130)
ALT SERPL-CCNC: 42 U/L (ref 5–41)
ANION GAP SERPL CALCULATED.3IONS-SCNC: 14 MMOL/L (ref 7–19)
AST SERPL-CCNC: 36 U/L (ref 5–40)
BASOPHILS ABSOLUTE: 0 K/UL (ref 0–0.2)
BASOPHILS RELATIVE PERCENT: 0.1 % (ref 0–1)
BILIRUB SERPL-MCNC: 0.5 MG/DL (ref 0.2–1.2)
BUN BLDV-MCNC: 28 MG/DL (ref 8–23)
CALCIUM SERPL-MCNC: 9.1 MG/DL (ref 8.8–10.2)
CHLORIDE BLD-SCNC: 101 MMOL/L (ref 98–111)
CO2: 21 MMOL/L (ref 22–29)
CREAT SERPL-MCNC: 1.6 MG/DL (ref 0.5–1.2)
EOSINOPHILS ABSOLUTE: 0.3 K/UL (ref 0–0.6)
EOSINOPHILS RELATIVE PERCENT: 3.7 % (ref 0–5)
GFR AFRICAN AMERICAN: 50
GFR NON-AFRICAN AMERICAN: 42
GLUCOSE BLD-MCNC: 93 MG/DL (ref 74–109)
HCT VFR BLD CALC: 39 % (ref 42–52)
HEMOGLOBIN: 13.3 G/DL (ref 14–18)
IMMATURE GRANULOCYTES #: 0.1 K/UL
LYMPHOCYTES ABSOLUTE: 1.7 K/UL (ref 1.1–4.5)
LYMPHOCYTES RELATIVE PERCENT: 22.2 % (ref 20–40)
MCH RBC QN AUTO: 33.2 PG (ref 27–31)
MCHC RBC AUTO-ENTMCNC: 34.1 G/DL (ref 33–37)
MCV RBC AUTO: 97.3 FL (ref 80–94)
MONOCYTES ABSOLUTE: 0.6 K/UL (ref 0–0.9)
MONOCYTES RELATIVE PERCENT: 7.6 % (ref 0–10)
NEUTROPHILS ABSOLUTE: 5.1 K/UL (ref 1.5–7.5)
NEUTROPHILS RELATIVE PERCENT: 65.8 % (ref 50–65)
PDW BLD-RTO: 13.2 % (ref 11.5–14.5)
PLATELET # BLD: 112 K/UL (ref 130–400)
PMV BLD AUTO: 13 FL (ref 9.4–12.4)
POTASSIUM SERPL-SCNC: 4.2 MMOL/L (ref 3.5–5)
RBC # BLD: 4.01 M/UL (ref 4.7–6.1)
SODIUM BLD-SCNC: 136 MMOL/L (ref 136–145)
TOTAL CK: 81 U/L (ref 39–308)
TOTAL PROTEIN: 7.3 G/DL (ref 6.6–8.7)
WBC # BLD: 7.8 K/UL (ref 4.8–10.8)

## 2021-05-27 PROCEDURE — 4040F PNEUMOC VAC/ADMIN/RCVD: CPT | Performed by: NURSE PRACTITIONER

## 2021-05-27 PROCEDURE — 93000 ELECTROCARDIOGRAM COMPLETE: CPT | Performed by: NURSE PRACTITIONER

## 2021-05-27 PROCEDURE — G8417 CALC BMI ABV UP PARAM F/U: HCPCS | Performed by: NURSE PRACTITIONER

## 2021-05-27 PROCEDURE — 1123F ACP DISCUSS/DSCN MKR DOCD: CPT | Performed by: NURSE PRACTITIONER

## 2021-05-27 PROCEDURE — 99214 OFFICE O/P EST MOD 30 MIN: CPT | Performed by: NURSE PRACTITIONER

## 2021-05-27 PROCEDURE — G8427 DOCREV CUR MEDS BY ELIG CLIN: HCPCS | Performed by: NURSE PRACTITIONER

## 2021-05-27 PROCEDURE — 36415 COLL VENOUS BLD VENIPUNCTURE: CPT | Performed by: NURSE PRACTITIONER

## 2021-05-27 PROCEDURE — 1036F TOBACCO NON-USER: CPT | Performed by: NURSE PRACTITIONER

## 2021-05-27 RX ORDER — CARVEDILOL 3.12 MG/1
3.12 TABLET ORAL 2 TIMES DAILY WITH MEALS
COMMUNITY

## 2021-05-27 RX ORDER — RANOLAZINE 500 MG/1
500 TABLET, EXTENDED RELEASE ORAL 2 TIMES DAILY
COMMUNITY
End: 2021-10-07 | Stop reason: SDUPTHER

## 2021-05-27 RX ORDER — CLOPIDOGREL BISULFATE 75 MG/1
75 TABLET ORAL DAILY
COMMUNITY

## 2021-05-27 RX ORDER — ATORVASTATIN CALCIUM 40 MG/1
40 TABLET, FILM COATED ORAL DAILY
COMMUNITY
End: 2021-05-27 | Stop reason: SINTOL

## 2021-05-27 NOTE — PROGRESS NOTES
[facility] PHYSICIAN SERVICES  [facility]  [address]  [address] [address]  Dept: [phone]  Dept Fax: [phone]  Loc: [phone]    Luci Jiang is a 79 y.o. male who presents today for his medical conditions/complaints as noted below. Luci Jiang is c/o of Leg Swelling (Pt is s/p heart cath 10 days and again on Monday. He states his Lipitor was increased prior to the procedure. He has been having BLE swelling/pain since the med increase.) and Leg Pain        HPI:     HPI this 25-year-old male presents today for swelling in his bilateral lower extremities. He states that he had heart cath through the right groin 10 days ago that was repeated on Monday in the left groin because they were unable to access the area needing a stent in the first procedure. He states that the plug used on the right side held but the 1 in the left failed causing a great deal of bruising to his left leg genital area. He states that at that time the doctor increased his Lipitor from 10 mg daily and added 40 more milligrams daily. His leg pain started shortly after that. He is spoken with several friends that states they had the same problems with Lipitor. Chief Complaint   Patient presents with    Leg Swelling     Pt is s/p heart cath 10 days and again on Monday. He states his Lipitor was increased prior to the procedure. He has been having BLE swelling/pain since the med increase.     Leg Pain     Past Medical History:   Diagnosis Date    Acid reflux     GERD (gastroesophageal reflux disease)     Hx of blood clots     left leg    Hypertension       Past Surgical History:   Procedure Laterality Date    CARDIOVASCULAR STRESS TEST  07/2015    CAROTID ENDARTERECTOMY Left 6/28/2016    CAROTID ENDARTERECTOMY WITH GORETEX ACUSEAL PATCH ANGIOPLASTY performed by Alfonso Olson MD at [facility]      LEG SURGERY      (L) blood clot    VASCULAR SURGERY 7/14/2015 [facility]    [place] right carotid endarterectomy        Vitals 5/27/2021 3/1/2021 9/9/2020 8/24/2020 12/18/2019 2/16/3160   SYSTOLIC [phone]   DIASTOLIC 78 80 78 90 80 80   Site Left Upper Arm - - - Left Upper Arm Left Upper Arm   Position Sitting - - - Sitting Sitting   Cuff Size Medium Adult - - - Medium Adult Large Adult   Pulse 78 86 - 74 78 82   Temp 97.7 96.9 - 97.6 97.7 98.3   Resp - 18 - 18 18 18   SpO2 98 94 - 94 96 94   Weight 249 lb 12.8 oz 252 lb 6.4 oz 240 lb 246 lb 6.4 oz 256 lb 250 lb 12.8 oz   Height 5' 10\" 5' 10\" 5' 11\" 5' 11\" 5' 10\" 5' 11\"   Body mass index 35.84 kg/m2 36.21 kg/m2 33.47 kg/m2 34.36 kg/m2 36.73 kg/m2 34.98 kg/m2   Some recent data might be hidden       Family History   Problem Relation Age of Onset    No Known Problems Mother     No Known Problems Father        Social History     Tobacco Use    Smoking status: Never Smoker    Smokeless tobacco: Never Used   Substance Use Topics    Alcohol use: No      Current Outpatient Medications   Medication Sig Dispense Refill    clopidogrel (PLAVIX) 75 MG tablet Take 75 mg by mouth daily      atorvastatin (LIPITOR) 40 MG tablet Take 40 mg by mouth daily      carvedilol (COREG) 3.125 MG tablet Take 3.125 mg by mouth 2 times daily (with meals)      ranolazine (RANEXA) 500 MG extended release tablet Take 500 mg by mouth 2 times daily      cloNIDine (CATAPRES) 0.1 MG tablet Take 1 tablet by mouth 2 times daily 180 tablet 3    atorvastatin (LIPITOR) 10 MG tablet Take 1 tablet by mouth daily At bedtime for high cholesterol 90 tablet 1    allopurinol (ZYLOPRIM) 100 MG tablet TAKE 1 TABLET TWICE DAILY 180 tablet 3    amLODIPine (NORVASC) 10 MG tablet TAKE 1 TABLET EVERY NIGHT for blood pressure 90 tablet 3    metFORMIN (GLUCOPHAGE) 500 MG tablet Take 1 tablet by mouth 2 times daily (with meals) For diabetes (Patient taking differently: Take 1,000 mg by mouth 2 times daily (with meals) For diabetes) 60 tablet 5    indapamide (LOZOL) 1.25 MG tablet TAKE 1 TABLET EVERY DAY 90 tablet 3    blood glucose test strips (CONTOUR NEXT TEST) strip 1 each by In Vitro route daily As needed. 100 each 3    aspirin 81 MG tablet Take 81 mg by mouth daily      Ascorbic Acid (VITAMIN C) 250 MG tablet Take 500 mg by mouth daily       vitamin E 1000 UNITS capsule Take 400 Units by mouth daily        No current facility-administered medications for this visit. No Known Allergies    Health Maintenance   Topic Date Due    DTaP/Tdap/Td vaccine (1 - Tdap) 02/20/1959    Shingles Vaccine (1 of 2) Never done    Annual Wellness Visit (AWV)  09/10/2021    Lipid screen  03/01/2022    Potassium monitoring  03/01/2022    Creatinine monitoring  03/01/2022    Pneumococcal 65+ years Vaccine  Completed    COVID-19 Vaccine  Completed    Hepatitis A vaccine  Aged Out    Hib vaccine  Aged Out    Meningococcal (ACWY) vaccine  Aged Out       Subjective:      Review of Systems   Constitutional: Negative for chills, fatigue and fever. HENT: Negative. Eyes: Negative. Respiratory: Negative. Cardiovascular: Positive for leg swelling. Negative for chest pain and palpitations. Gastrointestinal: Negative for abdominal pain, constipation, diarrhea, nausea and vomiting. Endocrine: Negative. Genitourinary: Negative for difficulty urinating, dysuria, hematuria, penile swelling, scrotal swelling and testicular pain. Musculoskeletal: Positive for myalgias. Bilateral lower extremities   Skin: Positive for color change. Allergic/Immunologic: Negative. Neurological: Negative. Hematological: Negative. Psychiatric/Behavioral: Negative. Objective:     Physical Exam  Vitals and nursing note reviewed. Constitutional:       General: He is not in acute distress. Appearance: Normal appearance. He is not ill-appearing or toxic-appearing. HENT:      Head: Normocephalic and atraumatic.       Nose: Nose normal. Mouth/Throat:      Mouth: Mucous membranes are dry. Pharynx: Oropharynx is clear. Eyes:      Extraocular Movements: Extraocular movements intact. Conjunctiva/sclera: Conjunctivae normal.      Pupils: Pupils are equal, round, and reactive to light. Cardiovascular:      Rate and Rhythm: Normal rate and regular rhythm. Pulses:           Radial pulses are 3+ on the right side and 3+ on the left side. Dorsalis pedis pulses are 2+ on the right side and 2+ on the left side. Posterior tibial pulses are 2+ on the right side and 2+ on the left side. Heart sounds: Normal heart sounds. Pulmonary:      Effort: Pulmonary effort is normal. No respiratory distress. Breath sounds: Normal breath sounds. No wheezing, rhonchi or rales. Abdominal:      General: Bowel sounds are normal. There is no distension. Palpations: Abdomen is soft. There is no mass. Tenderness: There is no abdominal tenderness. There is no guarding or rebound. Hernia: No hernia is present. Genitourinary:     Testes:         Right: Tenderness, testicular hydrocele or varicocele not present. Left: Tenderness, testicular hydrocele or varicocele not present. Musculoskeletal:         General: Tenderness present. No signs of injury. Cervical back: Normal range of motion and neck supple. No rigidity or tenderness. Right lower le+ Pitting Edema present. Left lower le+ Pitting Edema present. Skin:     General: Skin is warm and dry. Capillary Refill: Capillary refill takes less than 2 seconds. Findings: Bruising present. Neurological:      Mental Status: He is alert and oriented to person, place, and time. Psychiatric:         Mood and Affect: Mood normal.         Behavior: Behavior normal.         Thought Content:  Thought content normal.         Judgment: Judgment normal.       /78 (Site: Left Upper Arm, Position: Sitting, Cuff Size: Medium Adult) Pulse 78   Temp 97.7 °F (36.5 °C)   Ht 5' 10\" (1.778 m)   Wt 249 lb 12.8 oz (113.3 kg)   SpO2 98%   BMI 35.84 kg/m²     Assessment:       Diagnosis Orders   1. Pain and swelling of lower extremity, unspecified laterality  EKG 12 Lead         Plan:   EKG in office today evaluated normal sinus rhythm first-degree AV block, slowed conduction. No signs of acute ischemia    Labs today CK, myoglobin CBC and CMP. Results reviewed CK is normal.  CBC does show an acute anemia change from March 1, 2021 secondary to cath procedures. Repeat CBC next week due to severe bruising to ensure recovery. .  Results reviewed CMP indicates an renal insufficiency. BUN 28 creatinine 1.6 GFR 42 this is mildly worse than in March BUN was 25 creatinine was 1.4 GFR was 49. CMP reviewed from 8/24/2020 indicated a normal BUN/creatinine and a GFR of 58 at that time. Patient was instructed to make sure to drink at least eight 6 ounce glasses of water a day. He needs to flush his kidneys he had dye on 2 separate days. 1+ pitting edema noted bilaterally. Patient reports pain started before edema was this severe. Stop Lipitor. Consider different statin medication. Patient is scheduled to see Dr. Elan Whitfield next Wednesday he was instructed that she would reevaluate and determine new statin medication at that time. He was instructed to make sure that he did not miss his Plavix and aspirin and to continue all other medications. I believe that his edema is currently related to the inflammatory process and healing from bilateral groin access. He may have to have a short-term dose of a diuretic to pull fluids. He was instructed to ambulate as tolerated to promote venous return. While sitting he was instructed to have his feet elevated. See if this edema did not resolve by the time he follows up on next Wednesday    May need to consider compression socks. Patient given educational materials -see patient instructions.   Discussed use, benefit, and side effects of prescribed medications. All patient questions answered. Pt voiced understanding. Reviewed health maintenance. Instructed to continue currentmedications, diet and exercise. Patient agreed with treatment plan. Follow up as directed. MEDICATIONS:  No orders of the defined types were placed in this encounter. ORDERS:  Orders Placed This Encounter   Procedures    EKG 12 Lead       Follow-up:  No follow-ups on file. PATIENT INSTRUCTIONS:  There are no Patient Instructions on file for this visit. Electronically signed by SHELLY Warner NP on 5/27/2021 at 2:52 PM    EMR Dragon/transcription disclaimer:  Much of thisencounter note is electronic transcription/translation of spoken language to printed texts. The electronic translation of spoken language may be erroneous, or at times, nonsensical words or phrases may be inadvertentlytranscribed.   Although I have reviewed the note for such errors, some may still exist.

## 2021-05-28 ASSESSMENT — ENCOUNTER SYMPTOMS
ABDOMINAL PAIN: 0
NAUSEA: 0
RESPIRATORY NEGATIVE: 1
EYES NEGATIVE: 1
COLOR CHANGE: 1
CONSTIPATION: 0
ALLERGIC/IMMUNOLOGIC NEGATIVE: 1
VOMITING: 0
DIARRHEA: 0

## 2021-05-29 LAB — MYOGLOBIN: 65 NG/ML (ref 28–72)

## 2021-06-02 ENCOUNTER — OFFICE VISIT (OUTPATIENT)
Dept: PRIMARY CARE CLINIC | Age: 81
End: 2021-06-02
Payer: MEDICARE

## 2021-06-02 VITALS
HEIGHT: 70 IN | HEART RATE: 78 BPM | RESPIRATION RATE: 18 BRPM | DIASTOLIC BLOOD PRESSURE: 80 MMHG | OXYGEN SATURATION: 96 % | SYSTOLIC BLOOD PRESSURE: 148 MMHG | TEMPERATURE: 97.7 F | WEIGHT: 250 LBS | BODY MASS INDEX: 35.79 KG/M2

## 2021-06-02 DIAGNOSIS — N28.9 DECREASED RENAL FUNCTION: Primary | ICD-10-CM

## 2021-06-02 DIAGNOSIS — N28.9 RENAL INSUFFICIENCY: ICD-10-CM

## 2021-06-02 DIAGNOSIS — D69.6 THROMBOCYTOPENIA (HCC): ICD-10-CM

## 2021-06-02 LAB
ALBUMIN SERPL-MCNC: 4.2 G/DL (ref 3.5–5.2)
ALP BLD-CCNC: 84 U/L (ref 40–130)
ALT SERPL-CCNC: 43 U/L (ref 5–41)
ANION GAP SERPL CALCULATED.3IONS-SCNC: 14 MMOL/L (ref 7–19)
AST SERPL-CCNC: 31 U/L (ref 5–40)
BILIRUB SERPL-MCNC: 0.3 MG/DL (ref 0.2–1.2)
BUN BLDV-MCNC: 34 MG/DL (ref 8–23)
CALCIUM SERPL-MCNC: 9.5 MG/DL (ref 8.8–10.2)
CHLORIDE BLD-SCNC: 102 MMOL/L (ref 98–111)
CO2: 24 MMOL/L (ref 22–29)
CREAT SERPL-MCNC: 1.5 MG/DL (ref 0.5–1.2)
GFR AFRICAN AMERICAN: 54
GFR NON-AFRICAN AMERICAN: 45
GLUCOSE BLD-MCNC: 110 MG/DL (ref 74–109)
HCT VFR BLD CALC: 39 % (ref 42–52)
HEMOGLOBIN: 12.6 G/DL (ref 14–18)
MCH RBC QN AUTO: 32.2 PG (ref 27–31)
MCHC RBC AUTO-ENTMCNC: 32.3 G/DL (ref 33–37)
MCV RBC AUTO: 99.7 FL (ref 80–94)
PDW BLD-RTO: 13.7 % (ref 11.5–14.5)
PLATELET # BLD: 109 K/UL (ref 130–400)
PMV BLD AUTO: 12.7 FL (ref 9.4–12.4)
POTASSIUM SERPL-SCNC: 4.5 MMOL/L (ref 3.5–5)
RBC # BLD: 3.91 M/UL (ref 4.7–6.1)
SODIUM BLD-SCNC: 140 MMOL/L (ref 136–145)
TOTAL PROTEIN: 7.3 G/DL (ref 6.6–8.7)
WBC # BLD: 7 K/UL (ref 4.8–10.8)

## 2021-06-02 PROCEDURE — 1036F TOBACCO NON-USER: CPT | Performed by: FAMILY MEDICINE

## 2021-06-02 PROCEDURE — G8417 CALC BMI ABV UP PARAM F/U: HCPCS | Performed by: FAMILY MEDICINE

## 2021-06-02 PROCEDURE — 1123F ACP DISCUSS/DSCN MKR DOCD: CPT | Performed by: FAMILY MEDICINE

## 2021-06-02 PROCEDURE — 36415 COLL VENOUS BLD VENIPUNCTURE: CPT | Performed by: FAMILY MEDICINE

## 2021-06-02 PROCEDURE — 99214 OFFICE O/P EST MOD 30 MIN: CPT | Performed by: FAMILY MEDICINE

## 2021-06-02 PROCEDURE — G8427 DOCREV CUR MEDS BY ELIG CLIN: HCPCS | Performed by: FAMILY MEDICINE

## 2021-06-02 PROCEDURE — 4040F PNEUMOC VAC/ADMIN/RCVD: CPT | Performed by: FAMILY MEDICINE

## 2021-06-02 RX ORDER — ROSUVASTATIN CALCIUM 5 MG/1
TABLET, COATED ORAL
Qty: 30 TABLET | Refills: 5 | Status: SHIPPED | OUTPATIENT
Start: 2021-06-02 | End: 2021-08-23 | Stop reason: SDUPTHER

## 2021-06-02 NOTE — PATIENT INSTRUCTIONS
Your platelets, which help clot the blood, were low over the past year. There really is not anything that you can do to help this. We will keep an eye on it. Your kidney function, the way the blood is filtered by the kidney, (not how you urinate) was also decreased. It may have been due to the procedure that you recently had. I need you to increase your water intake. Stop Aleve or ibuprofen and go to extra strength Tylenol 2 tablets every 8 hours if needed for arthritis pain. I have started you on a new cholesterol medication, Crestor 5 mg. Take it at bedtime. Hopefully will not make your muscles hurt. I will see you in 3 months to recheck your labs and do blood work. I have included a handout that may explain this a little more. Patient Education        Thrombocytopenia: Care Instructions  Your Care Instructions     Thrombocytopenia is a low number of platelets in the blood. Platelets are the cells that help blood clot. If you don't have enough of them, your blood cannot clot well. So it is harder to stop bleeding. You may have low platelets because your bone marrow does not make them. Or your body's defenses (immune system) may destroy them. Having an enlarged spleen can also reduce the number of platelets in your blood. This is because they can get trapped in the enlarged spleen. Some diseases or medicines may also cause low platelets. But platelets may go back to normal levels if the disease is treated or the medicine is stopped. You may not need treatment if your problem is mild. If you do need treatment, you may have platelets added to your blood. Or you may get medicine to stop the loss of platelets or help your body make them. Follow-up care is a key part of your treatment and safety. Be sure to make and go to all appointments, and call your doctor if you are having problems. It's also a good idea to know your test results and keep a list of the medicines you take.   How can you care for yourself at home? · Be safe with medicines. Take your medicines exactly as prescribed. Call your doctor if you think you are having a problem with your medicine. · Do not take aspirin or anti-inflammatory medicines unless your doctor says it is okay. Examples are ibuprofen (Advil, Motrin) and naproxen (Aleve). They may increase the risk of bleeding. · Avoid contact sports or activities that could cause you to fall. When should you call for help? Call 911 anytime you think you may need emergency care. For example, call if:    · You passed out (lost consciousness).     · You have signs of severe bleeding, which includes:  ? You have a severe headache that is different from past headaches. ? You vomit blood or what looks like coffee grounds. ? Your stools are maroon or very bloody. Call your doctor now or seek immediate medical care if:    · You are dizzy or lightheaded, or you feel like you may faint.     · You have abnormal bleeding, such as:  ? A nosebleed that you can't easily stop. This means it's still bleeding after pressure has been applied 3 times for 10 minutes each time (30 minutes total). ? Your stools are black and look like tar, or they have streaks of blood. ? You have blood in your urine. ? You have joint pain. ? You have bruises or blood spots under your skin. Watch closely for changes in your health, and be sure to contact your doctor if:    · You do not get better as expected. Where can you learn more? Go to https://NeoAcceligLiveSchool.[email or web address]. org and sign in to your [employer] account. Enter P413 in the [facility] box to learn more about \"Thrombocytopenia: Care Instructions. \"     If you do not have an account, please click on the \"Sign Up Now\" link. Current as of: September 23, 2020               Content Version: 12.8  © 9319-2061 Healthwise, Incorporated. Care instructions adapted under license by [facility] [employer] ([facility]).  If you have questions about a medical condition or this instruction, always ask your healthcare professional. [address] any warranty or liability for your use of this information. Patient Education        Learning About Monitoring Kidney Function  What is kidney function? How well your kidneys work is called kidney function. Your kidneys have the important job of filtering your blood. They remove waste products and extra fluid from your body. Why is it important to check kidney function? When your kidneys do not work as they should, waste can build up in your blood. This can make you sick. If you have long-term (chronic) kidney disease, you may not have any symptoms until your kidney function is very low. Blood and urine tests can help show how well your kidneys are working. They can help your doctor know:  · If the disease started all of a sudden or if you have had it a long time. · The cause of the kidney damage. · The best type of treatment to slow the damage. · How well treatment is working. · When to begin dialysis or have a kidney transplant. What tests might be used? Tests that check kidney function may include:  Creatinine clearance test.   This test measures how well your kidneys are filtering your blood. If your creatinine clearance goes down, this means that your kidneys aren't filtering your blood as well. Blood creatinine test.   This test shows the level of a waste product called creatinine in your blood. A high blood creatinine level may mean that your kidneys aren't working as they should. Urine tests. These tests measure certain substances, such as protein, in your urine. These tests may be done over 24 hours. Or you may do a one-time sample. Kidney damage can cause increased protein in the urine. This is because the kidneys can't filter protein as well as they used to. Blood urea nitrogen (BUN) test.   This test measures the amount of nitrogen in your blood.  This comes from a waste product called urea. If your kidneys can't remove urea from the blood normally, your BUN level rises. How often should you be tested? Experts recommend screening tests for people who are at higher risk of the disease. This includes people with diabetes or high blood pressure. If you have diabetes, your doctor may recommend checking your kidney function yearly. This is often done with blood and urine tests. When to begin testing depends on the type of diabetes you have. Follow-up care is a key part of your treatment and safety. Be sure to make and go to all appointments, and call your doctor if you are having problems. It's also a good idea to keep a list of the medicines you take. Ask your doctor when you can expect to have your test results. Where can you learn more? Go to https://MilePoint.StreamStar. org and sign in to your [employer] account. Enter B694 in the [employer] box to learn more about \"Learning About Monitoring Kidney Function. \"     If you do not have an account, please click on the \"Sign Up Now\" link. Current as of: December 17, 2020               Content Version: 12.8  © 5213-4753 Healthwise, Incorporated. Care instructions adapted under license by [facility] ([facility]). If you have questions about a medical condition or this instruction, always ask your healthcare professional. [address] any warranty or liability for your use of this information.

## 2021-06-04 DIAGNOSIS — D64.9 ANEMIA, UNSPECIFIED TYPE: ICD-10-CM

## 2021-06-04 DIAGNOSIS — D69.6 THROMBOCYTOPENIA (HCC): Primary | ICD-10-CM

## 2021-06-08 ENCOUNTER — NURSE ONLY (OUTPATIENT)
Dept: PRIMARY CARE CLINIC | Age: 81
End: 2021-06-08
Payer: MEDICARE

## 2021-06-08 DIAGNOSIS — D64.9 ANEMIA, UNSPECIFIED TYPE: ICD-10-CM

## 2021-06-08 DIAGNOSIS — D69.6 THROMBOCYTOPENIA (HCC): ICD-10-CM

## 2021-06-08 DIAGNOSIS — N28.9 DECREASED RENAL FUNCTION: Primary | ICD-10-CM

## 2021-06-08 LAB
ANION GAP SERPL CALCULATED.3IONS-SCNC: 13 MMOL/L (ref 7–19)
BUN BLDV-MCNC: 24 MG/DL (ref 8–23)
CALCIUM SERPL-MCNC: 9.5 MG/DL (ref 8.8–10.2)
CHLORIDE BLD-SCNC: 98 MMOL/L (ref 98–111)
CO2: 24 MMOL/L (ref 22–29)
CREAT SERPL-MCNC: 1.4 MG/DL (ref 0.5–1.2)
FERRITIN: 223.7 NG/ML (ref 30–400)
GFR AFRICAN AMERICAN: 59
GFR NON-AFRICAN AMERICAN: 48
GLUCOSE BLD-MCNC: 107 MG/DL (ref 74–109)
HCT VFR BLD CALC: 42.7 % (ref 42–52)
HEMOGLOBIN: 13.9 G/DL (ref 14–18)
MCH RBC QN AUTO: 32.6 PG (ref 27–31)
MCHC RBC AUTO-ENTMCNC: 32.6 G/DL (ref 33–37)
MCV RBC AUTO: 100 FL (ref 80–94)
PDW BLD-RTO: 13.7 % (ref 11.5–14.5)
PLATELET # BLD: 126 K/UL (ref 130–400)
PMV BLD AUTO: 12.5 FL (ref 9.4–12.4)
POTASSIUM SERPL-SCNC: 4.6 MMOL/L (ref 3.5–5)
RBC # BLD: 4.27 M/UL (ref 4.7–6.1)
SODIUM BLD-SCNC: 135 MMOL/L (ref 136–145)
WBC # BLD: 6.5 K/UL (ref 4.8–10.8)

## 2021-06-08 PROCEDURE — 36415 COLL VENOUS BLD VENIPUNCTURE: CPT | Performed by: FAMILY MEDICINE

## 2021-06-08 ASSESSMENT — ENCOUNTER SYMPTOMS: RESPIRATORY NEGATIVE: 1

## 2021-06-09 NOTE — PROGRESS NOTES
SUBJECTIVE:    Jemma Moody is 79 y.o.male who comes in complaining of Follow-up (had a stenet placed )   . HPI: Mr. Nona Dey comes in today for follow-up after having a stent placed by Dr. Hans Rubio on 5/24/2021. He has known coronary artery disease with stable angina and a severe right coronary artery stenosis. This was where the stent was placed. He says he is doing fairly well. He did have a BMP on 5/25/2021 which revealed a BUN of 27 with a creatinine of 1.57 and a GFR of 43. CBC that same day revealed a hemoglobin of 12.4 with platelets of 21,634. He was told to follow-up with us for further recheck. He says he is doing very well. His groin was very bruised but that is better now also. Denies any chest pain or shortness of breath. He was placed on Plavix but denies any bleeding from the bladder or when he brushes his teeth. He was placed on Lipitor 40 mg but he says he just cannot tolerate it. It really made his muscles ache. No Known Allergies    Social History     Socioeconomic History    Marital status:      Spouse name: Not on file    Number of children: 3    Years of education: Not on file    Highest education level: Not on file   Occupational History     Employer: [employer]   Tobacco Use    Smoking status: Never Smoker    Smokeless tobacco: Never Used   Substance and Sexual Activity    Alcohol use: No    Drug use: No    Sexual activity: Not on file   Other Topics Concern    Not on file   Social History Narrative    Not on file     Social Determinants of Health     Financial Resource Strain:     Difficulty of Paying Living Expenses:    Food Insecurity:     Worried About Running Out of Food in the Last Year:     [address] in the Last Year:    Transportation Needs:     Lack of Transportation (Medical):      Lack of Transportation (Non-Medical):    Physical Activity:     Days of Exercise per Week:     Minutes of Exercise per Session:    Stress:     Feeling of Stress :    Social Connections:     Frequency of Communication with Friends and Family:     Frequency of Social Gatherings with Friends and Family:     Attends Muslim Services:     Active Member of Clubs or Organizations:     Attends Club or Organization Meetings:     Marital Status:    Intimate Partner Violence:     Fear of Current or Ex-Partner:     Emotionally Abused:     Physically Abused:     Sexually Abused:        Review of Systems   Constitutional: Negative. Respiratory: Negative. Cardiovascular: Negative. Musculoskeletal: Positive for arthralgias. Neurological: Negative. Hematological: Bruises/bleeds easily. Psychiatric/Behavioral: Negative. Current Outpatient Medications on File Prior to Visit   Medication Sig Dispense Refill    clopidogrel (PLAVIX) 75 MG tablet Take 75 mg by mouth daily      carvedilol (COREG) 3.125 MG tablet Take 3.125 mg by mouth 2 times daily (with meals)      ranolazine (RANEXA) 500 MG extended release tablet Take 500 mg by mouth 2 times daily      cloNIDine (CATAPRES) 0.1 MG tablet Take 1 tablet by mouth 2 times daily 180 tablet 3    allopurinol (ZYLOPRIM) 100 MG tablet TAKE 1 TABLET TWICE DAILY 180 tablet 3    amLODIPine (NORVASC) 10 MG tablet TAKE 1 TABLET EVERY NIGHT for blood pressure 90 tablet 3    indapamide (LOZOL) 1.25 MG tablet TAKE 1 TABLET EVERY DAY 90 tablet 3    blood glucose test strips (CONTOUR NEXT TEST) strip 1 each by In Vitro route daily As needed. 100 each 3    aspirin 81 MG tablet Take 81 mg by mouth daily      Ascorbic Acid (VITAMIN C) 250 MG tablet Take 500 mg by mouth daily       vitamin E 1000 UNITS capsule Take 400 Units by mouth daily        No current facility-administered medications on file prior to visit.           OBJECTIVE:    Wt Readings from Last 3 Encounters:   06/02/21 250 lb (113.4 kg)   05/27/21 249 lb 12.8 oz (113.3 kg)   03/01/21 252 lb 6.4 oz (114.5 kg)       BP (!) 148/80   Pulse 78   Temp 97.7 °F (36.5 °C)   Resp 18   Ht 5' 10\" (1.778 m)   Wt 250 lb (113.4 kg)   SpO2 96%   BMI 35.87 kg/m²     Physical Exam  Vitals and nursing note reviewed. Constitutional:       General: He is not in acute distress. Appearance: Normal appearance. He is well-developed. He is not ill-appearing. HENT:      Head: Normocephalic. Right Ear: Tympanic membrane, ear canal and external ear normal.      Left Ear: Tympanic membrane, ear canal and external ear normal.   Eyes:      Extraocular Movements: Extraocular movements intact. Conjunctiva/sclera: Conjunctivae normal.      Pupils: Pupils are equal, round, and reactive to light. Cardiovascular:      Rate and Rhythm: Normal rate and regular rhythm. Pulses: Normal pulses. Heart sounds: Normal heart sounds. Pulmonary:      Effort: Pulmonary effort is normal.      Breath sounds: Normal breath sounds. Musculoskeletal:      Cervical back: Normal range of motion and neck supple. Lymphadenopathy:      Cervical: No cervical adenopathy. Skin:     General: Skin is warm and dry. Findings: Bruising present. Comments: He still has a lot of bruising in his left groin but no increased warmth or redness. No evidence of hematoma   Neurological:      General: No focal deficit present. Mental Status: He is alert and oriented to person, place, and time. Psychiatric:         Mood and Affect: Mood normal.         Behavior: Behavior normal.         Thought Content: Thought content normal.         Judgment: Judgment normal.         ASSESSMENT:    1. Decreased renal function    2. Thrombocytopenia ([address]    3. Renal insufficiency          PLAN:    MEDICATIONS:  Orders Placed This Encounter   Medications    rosuvastatin (CRESTOR) 5 MG tablet     Si tablet by mouth at bedtime for high cholesterol     Dispense:  30 tablet     Refill:  5     Continue current medications. We will refill when needed.     ORDERS:  Orders Placed This Encounter Procedures    CBC     Going to check a CMP and CBC. We will contact him when we get results of his blood work. If he has any problems he is to let me know. He will follow up with Dr. Michaell Fabry. We will change him to low-dose Crestor for his cholesterol. .  I will recheck him in 3 months. We talked about what he can use instead of NSAIDs. All of this was placed in the after visit summary. Follow-up:  Return in about 3 months (around 9/2/2021) for Recheck with me for blood work and fasting lab work. PATIENT INSTRUCTIONS:  Patient Instructions     Your platelets, which help clot the blood, were low over the past year. There really is not anything that you can do to help this. We will keep an eye on it. Your kidney function, the way the blood is filtered by the kidney, (not how you urinate) was also decreased. It may have been due to the procedure that you recently had. I need you to increase your water intake. Stop Aleve or ibuprofen and go to extra strength Tylenol 2 tablets every 8 hours if needed for arthritis pain. I have started you on a new cholesterol medication, Crestor 5 mg. Take it at bedtime. Hopefully will not make your muscles hurt. I will see you in 3 months to recheck your labs and do blood work. I have included a handout that may explain this a little more. Patient Education        Thrombocytopenia: Care Instructions  Your Care Instructions     Thrombocytopenia is a low number of platelets in the blood. Platelets are the cells that help blood clot. If you don't have enough of them, your blood cannot clot well. So it is harder to stop bleeding. You may have low platelets because your bone marrow does not make them. Or your body's defenses (immune system) may destroy them. Having an enlarged spleen can also reduce the number of platelets in your blood. This is because they can get trapped in the enlarged spleen.   Some diseases or medicines may also cause low platelets. But platelets may go back to normal levels if the disease is treated or the medicine is stopped. You may not need treatment if your problem is mild. If you do need treatment, you may have platelets added to your blood. Or you may get medicine to stop the loss of platelets or help your body make them. Follow-up care is a key part of your treatment and safety. Be sure to make and go to all appointments, and call your doctor if you are having problems. It's also a good idea to know your test results and keep a list of the medicines you take. How can you care for yourself at home? · Be safe with medicines. Take your medicines exactly as prescribed. Call your doctor if you think you are having a problem with your medicine. · Do not take aspirin or anti-inflammatory medicines unless your doctor says it is okay. Examples are ibuprofen (Advil, Motrin) and naproxen (Aleve). They may increase the risk of bleeding. · Avoid contact sports or activities that could cause you to fall. When should you call for help? Call 911 anytime you think you may need emergency care. For example, call if:    · You passed out (lost consciousness).     · You have signs of severe bleeding, which includes:  ? You have a severe headache that is different from past headaches. ? You vomit blood or what looks like coffee grounds. ? Your stools are maroon or very bloody. Call your doctor now or seek immediate medical care if:    · You are dizzy or lightheaded, or you feel like you may faint.     · You have abnormal bleeding, such as:  ? A nosebleed that you can't easily stop. This means it's still bleeding after pressure has been applied 3 times for 10 minutes each time (30 minutes total). ? Your stools are black and look like tar, or they have streaks of blood. ? You have blood in your urine. ? You have joint pain. ? You have bruises or blood spots under your skin.    Watch closely for changes in your health, and be sure to contact your doctor if:    · You do not get better as expected. Where can you learn more? Go to [email or web address] and sign in to your [employer] account. Enter D466 in the [facility] box to learn more about \"Thrombocytopenia: Care Instructions. \"     If you do not have an account, please click on the \"Sign Up Now\" link. Current as of: September 23, 2020               Content Version: 12.8  © 2006-2021 [employer]. Care instructions adapted under license by [employer]. If you have questions about a medical condition or this instruction, always ask your healthcare professional. [address] any warranty or liability for your use of this information. Patient Education        Learning About Monitoring Kidney Function  What is kidney function? How well your kidneys work is called kidney function. Your kidneys have the important job of filtering your blood. They remove waste products and extra fluid from your body. Why is it important to check kidney function? When your kidneys do not work as they should, waste can build up in your blood. This can make you sick. If you have long-term (chronic) kidney disease, you may not have any symptoms until your kidney function is very low. Blood and urine tests can help show how well your kidneys are working. They can help your doctor know:  · If the disease started all of a sudden or if you have had it a long time. · The cause of the kidney damage. · The best type of treatment to slow the damage. · How well treatment is working. · When to begin dialysis or have a kidney transplant. What tests might be used? Tests that check kidney function may include:  Creatinine clearance test.   This test measures how well your kidneys are filtering your blood. If your creatinine clearance goes down, this means that your kidneys aren't filtering your blood as well.   Blood creatinine test.   This or liability for your use of this information. EMR Dragon/transcription disclaimer:  Much of this encounter note is electronic transcription/translation of spoken language to printed texts. The electronic translation of spoken language may be erroneous, or at times, nonsensical words or phrases may beinadvertently transcribed.   Although I have reviewed the note for such errors, some may still exist.

## 2021-06-10 DIAGNOSIS — D69.6 THROMBOCYTOPENIA (HCC): Primary | ICD-10-CM

## 2021-06-14 LAB — ACT BLD: 312 SECONDS (ref 82–152)

## 2021-06-16 RX ORDER — INDAPAMIDE 1.25 MG/1
1.25 TABLET, FILM COATED ORAL 2 TIMES DAILY
Qty: 180 TABLET | Refills: 4 | Status: SHIPPED | OUTPATIENT
Start: 2021-06-16

## 2021-06-22 DIAGNOSIS — D69.6 THROMBOCYTOPENIA (HCC): Primary | ICD-10-CM

## 2021-06-23 NOTE — PROGRESS NOTES
(GLUCOPHAGE) 500 MG tablet, Take 2 tablets by mouth 2 times daily (with meals) For diabetes, Disp: 180 tablet, Rfl: 3    rosuvastatin (CRESTOR) 5 MG tablet, 1 tablet by mouth at bedtime for high cholesterol, Disp: 30 tablet, Rfl: 5    clopidogrel (PLAVIX) 75 MG tablet, Take 75 mg by mouth daily, Disp: , Rfl:     carvedilol (COREG) 3.125 MG tablet, Take 3.125 mg by mouth 2 times daily (with meals), Disp: , Rfl:     ranolazine (RANEXA) 500 MG extended release tablet, Take 500 mg by mouth 2 times daily, Disp: , Rfl:     cloNIDine (CATAPRES) 0.1 MG tablet, Take 1 tablet by mouth 2 times daily, Disp: 180 tablet, Rfl: 3    allopurinol (ZYLOPRIM) 100 MG tablet, TAKE 1 TABLET TWICE DAILY, Disp: 180 tablet, Rfl: 3    amLODIPine (NORVASC) 10 MG tablet, TAKE 1 TABLET EVERY NIGHT for blood pressure, Disp: 90 tablet, Rfl: 3    indapamide (LOZOL) 1.25 MG tablet, TAKE 1 TABLET EVERY DAY, Disp: 90 tablet, Rfl: 3    blood glucose test strips (CONTOUR NEXT TEST) strip, 1 each by In Vitro route daily As needed. , Disp: 100 each, Rfl: 3    aspirin 81 MG tablet, Take 81 mg by mouth daily, Disp: , Rfl:     Ascorbic Acid (VITAMIN C) 250 MG tablet, Take 500 mg by mouth daily , Disp: , Rfl:     vitamin E 1000 UNITS capsule, Take 400 Units by mouth daily , Disp: , Rfl:      No Known Allergies    Social History     Tobacco Use    Smoking status: Never Smoker    Smokeless tobacco: Never Used   Substance Use Topics    Alcohol use: No    Drug use: No       Family History   Problem Relation Age of Onset    No Known Problems Mother     No Known Problems Father        Subjective   REVIEW OF SYSTEMS:   Review of Systems   Constitutional: Negative for chills, diaphoresis, fatigue, fever and unexpected weight change. HENT: Positive for dental problem. Negative for mouth sores, nosebleeds, sore throat, trouble swallowing and voice change. Eyes: Positive for visual disturbance. Negative for photophobia, discharge and itching. Respiratory: Positive for cough (Nonproductive), shortness of breath (With exertion) and wheezing. Cardiovascular: Negative for chest pain, palpitations and leg swelling. Gastrointestinal: Negative for abdominal distention, abdominal pain, blood in stool, constipation, diarrhea, nausea and vomiting. Endocrine: Negative for cold intolerance, heat intolerance, polydipsia and polyuria. Genitourinary: Negative for difficulty urinating, dysuria, hematuria and urgency. Musculoskeletal: Positive for arthralgias. Negative for back pain, joint swelling and myalgias. Skin: Negative for color change and rash. Neurological: Negative for dizziness, tremors, seizures, syncope and light-headedness. Hematological: Negative for adenopathy. Bruises/bleeds easily. Psychiatric/Behavioral: Negative for behavioral problems and suicidal ideas. The patient is not nervous/anxious. All other systems reviewed and are negative. Objective   /70   Pulse 81   Ht 5' 10\" (1.778 m)   Wt 241 lb (109.3 kg)   SpO2 93%   BMI 34.58 kg/m²     PHYSICAL EXAM:  Physical Exam  Vitals reviewed. Constitutional:       General: He is not in acute distress. Appearance: He is well-developed. HENT:      Head: Normocephalic and atraumatic. Nose: Nose normal.   Eyes:      General: No scleral icterus. Conjunctiva/sclera: Conjunctivae normal.   Neck:      Vascular: No JVD. Trachea: No tracheal deviation. Cardiovascular:      Rate and Rhythm: Normal rate and regular rhythm. Heart sounds: Normal heart sounds. No murmur heard. Pulmonary:      Effort: Pulmonary effort is normal. No respiratory distress. Breath sounds: Normal breath sounds. No wheezing. Abdominal:      General: Bowel sounds are normal. There is no distension. Palpations: Abdomen is soft. There is no mass. Tenderness: There is no abdominal tenderness. Musculoskeletal:         General: No tenderness or deformity.  Normal range of motion. Skin:     Findings: Ecchymosis (Both arms) present. No erythema or rash. Neurological:      Mental Status: He is alert and oriented to person, place, and time. Psychiatric:         Thought Content: Thought content normal.       CBC reviewed by me  Lab Results   Component Value Date    WBC 6.85 06/24/2021    HGB 13.4 (L) 06/24/2021    HCT 40.9 06/24/2021    .5 (H) 06/24/2021     (L) 06/24/2021     Lab Results   Component Value Date    NEUTROABS 4.00 06/24/2021       VISIT DIAGNOSES  1. Thrombocytopenia ([address]    2. Macrocytosis    3. Elevation of levels of liver transaminase levels     4. Prediabetes         ASSESSMENT/PLAN:            Brisa Paiz had a coronary stent placed on 5/24/2021 by Dr. Letty Jacobsen. He is on Plavix 75 mg and aspirin 81 daily. He reports that he has known about mild thrombocytopenia going on for some time. He does have a history of easy bruising. He does not have any known history of liver dysfunction. CMP 6/2/2021 revealed slight elevation of ALT of 43 otherwise LFTs WNL. CBC today reveals WBC is 6.85 with normal percent differential.  Hgb 13.4 with .5, ,000. I discussed the fact that he does have mild thrombocytopenia. He does have bruising issues. I also discussed fact that his MCV was slightly elevated. Serology will be requested for evaluation. However, I explained to Marnie Murray and his wife Tomer Hardin that his platelets are not dangerously low by any means. We will monitor his CBC. · Colon cancer screening. Colonoscopy 4/2016 with 2 polyps-tubular adenomas removed by Dr. Jodie Persaud. Follow-up plan for 4/21 however patient on Plavix due to cardiac stent-colonoscopy delayed. Immunization History   Administered Date(s) Administered   ALEC Riggs, 100mcg/0.5mL 02/24/2021, 03/31/2021       Thank you Dr. Caleb Harrison for referring [facility] for evaluation.     Orders Placed This Encounter   Procedures    Vitamin B12    Folate

## 2021-06-24 ENCOUNTER — HOSPITAL ENCOUNTER (OUTPATIENT)
Dept: INFUSION THERAPY | Age: 81
Discharge: HOME | End: 2021-06-24
Payer: MEDICARE

## 2021-06-24 ENCOUNTER — OFFICE VISIT (OUTPATIENT)
Dept: HEMATOLOGY | Age: 81
End: 2021-06-24
Payer: MEDICARE

## 2021-06-24 VITALS
HEART RATE: 81 BPM | WEIGHT: 241 LBS | BODY MASS INDEX: 34.5 KG/M2 | DIASTOLIC BLOOD PRESSURE: 70 MMHG | SYSTOLIC BLOOD PRESSURE: 124 MMHG | OXYGEN SATURATION: 93 % | HEIGHT: 70 IN

## 2021-06-24 DIAGNOSIS — D75.89 MACROCYTOSIS: ICD-10-CM

## 2021-06-24 DIAGNOSIS — D69.6 THROMBOCYTOPENIA (HCC): ICD-10-CM

## 2021-06-24 DIAGNOSIS — D69.6 THROMBOCYTOPENIA (HCC): Primary | ICD-10-CM

## 2021-06-24 DIAGNOSIS — R73.03 PREDIABETES: ICD-10-CM

## 2021-06-24 DIAGNOSIS — R74.01 ELEVATION OF LEVELS OF LIVER TRANSAMINASE LEVELS: ICD-10-CM

## 2021-06-24 LAB
BASOPHILS ABSOLUTE: 0.01 K/UL (ref 0.01–0.08)
BASOPHILS RELATIVE PERCENT: 0.1 % (ref 0.1–1.2)
EOSINOPHILS ABSOLUTE: 0.17 K/UL (ref 0.04–0.54)
EOSINOPHILS RELATIVE PERCENT: 2.5 % (ref 0.7–7)
FOLATE: 18.5 NG/ML (ref 2.7–20)
HCT VFR BLD CALC: 40.9 % (ref 40.1–51)
HEMOGLOBIN: 13.4 G/DL (ref 13.7–17.5)
LACTATE DEHYDROGENASE: 429 U/L (ref 313–618)
LYMPHOCYTES ABSOLUTE: 2.06 K/UL (ref 1.18–3.74)
LYMPHOCYTES RELATIVE PERCENT: 30.1 % (ref 19.3–53.1)
MCH RBC QN AUTO: 32.9 PG (ref 25.7–32.2)
MCHC RBC AUTO-ENTMCNC: 32.8 G/DL (ref 32.3–36.5)
MCV RBC AUTO: 100.5 FL (ref 79–92.2)
MONOCYTES ABSOLUTE: 0.61 K/UL (ref 0.24–0.82)
MONOCYTES RELATIVE PERCENT: 8.9 % (ref 4.7–12.5)
NEUTROPHILS ABSOLUTE: 4 K/UL (ref 1.56–6.13)
NEUTROPHILS RELATIVE PERCENT: 58.4 % (ref 34–71.1)
PDW BLD-RTO: 13.3 % (ref 11.6–14.4)
PLATELET # BLD: 120 K/UL (ref 163–337)
PMV BLD AUTO: 11.7 FL (ref 7.4–10.4)
RBC # BLD: 4.07 M/UL (ref 4.63–6.08)
VITAMIN B-12: 260 PG/ML (ref 239–931)
WBC # BLD: 6.85 K/UL (ref 4.23–9.07)

## 2021-06-24 PROCEDURE — 1036F TOBACCO NON-USER: CPT | Performed by: PHYSICIAN ASSISTANT

## 2021-06-24 PROCEDURE — 85025 COMPLETE CBC W/AUTO DIFF WBC: CPT

## 2021-06-24 PROCEDURE — 82746 ASSAY OF FOLIC ACID SERUM: CPT

## 2021-06-24 PROCEDURE — 82607 VITAMIN B-12: CPT

## 2021-06-24 PROCEDURE — 1123F ACP DISCUSS/DSCN MKR DOCD: CPT | Performed by: PHYSICIAN ASSISTANT

## 2021-06-24 PROCEDURE — 4040F PNEUMOC VAC/ADMIN/RCVD: CPT | Performed by: PHYSICIAN ASSISTANT

## 2021-06-24 PROCEDURE — 99212 OFFICE O/P EST SF 10 MIN: CPT

## 2021-06-24 PROCEDURE — G8427 DOCREV CUR MEDS BY ELIG CLIN: HCPCS | Performed by: PHYSICIAN ASSISTANT

## 2021-06-24 PROCEDURE — 99203 OFFICE O/P NEW LOW 30 MIN: CPT | Performed by: PHYSICIAN ASSISTANT

## 2021-06-24 PROCEDURE — 83615 LACTATE (LD) (LDH) ENZYME: CPT

## 2021-06-24 PROCEDURE — G8417 CALC BMI ABV UP PARAM F/U: HCPCS | Performed by: PHYSICIAN ASSISTANT

## 2021-06-24 PROCEDURE — 36415 COLL VENOUS BLD VENIPUNCTURE: CPT

## 2021-06-24 ASSESSMENT — ENCOUNTER SYMPTOMS
DIARRHEA: 0
SORE THROAT: 0
TROUBLE SWALLOWING: 0
COLOR CHANGE: 0
ABDOMINAL PAIN: 0
COUGH: 1
EYE ITCHING: 0
WHEEZING: 1
VOMITING: 0
VOICE CHANGE: 0
SHORTNESS OF BREATH: 1
CONSTIPATION: 0
ABDOMINAL DISTENTION: 0
BLOOD IN STOOL: 0
BACK PAIN: 0
NAUSEA: 0
PHOTOPHOBIA: 0
EYE DISCHARGE: 0

## 2021-06-25 DIAGNOSIS — D51.9 ANEMIA DUE TO VITAMIN B12 DEFICIENCY, UNSPECIFIED B12 DEFICIENCY TYPE: ICD-10-CM

## 2021-06-25 DIAGNOSIS — E53.8 VITAMIN B12 DEFICIENCY: Primary | ICD-10-CM

## 2021-06-25 RX ORDER — CYANOCOBALAMIN 1000 UG/ML
1000 INJECTION INTRAMUSCULAR; SUBCUTANEOUS ONCE
Status: CANCELLED
Start: 2021-06-28 | End: 2021-06-28

## 2021-06-25 RX ORDER — CYANOCOBALAMIN 1000 UG/ML
1000 INJECTION INTRAMUSCULAR; SUBCUTANEOUS ONCE
Status: CANCELLED
Start: 2021-06-28

## 2021-06-28 ENCOUNTER — OFFICE VISIT (OUTPATIENT)
Dept: CARDIOLOGY | Facility: CLINIC | Age: 81
End: 2021-06-28

## 2021-06-28 ENCOUNTER — HOSPITAL ENCOUNTER (OUTPATIENT)
Dept: INFUSION THERAPY | Age: 81
Discharge: HOME | End: 2021-06-28
Payer: MEDICARE

## 2021-06-28 VITALS
HEART RATE: 73 BPM | BODY MASS INDEX: 34.65 KG/M2 | DIASTOLIC BLOOD PRESSURE: 70 MMHG | WEIGHT: 242 LBS | SYSTOLIC BLOOD PRESSURE: 132 MMHG | OXYGEN SATURATION: 96 % | HEIGHT: 70 IN

## 2021-06-28 DIAGNOSIS — I51.9 LEFT VENTRICULAR SYSTOLIC DYSFUNCTION: ICD-10-CM

## 2021-06-28 DIAGNOSIS — D51.9 ANEMIA DUE TO VITAMIN B12 DEFICIENCY, UNSPECIFIED B12 DEFICIENCY TYPE: Primary | ICD-10-CM

## 2021-06-28 DIAGNOSIS — N18.31 STAGE 3A CHRONIC KIDNEY DISEASE (HCC): ICD-10-CM

## 2021-06-28 DIAGNOSIS — I10 ESSENTIAL HYPERTENSION: ICD-10-CM

## 2021-06-28 DIAGNOSIS — I25.118 CORONARY ARTERY DISEASE OF NATIVE ARTERY OF NATIVE HEART WITH STABLE ANGINA PECTORIS (HCC): Primary | ICD-10-CM

## 2021-06-28 DIAGNOSIS — E78.2 MIXED HYPERLIPIDEMIA: ICD-10-CM

## 2021-06-28 PROCEDURE — 96372 THER/PROPH/DIAG INJ SC/IM: CPT

## 2021-06-28 PROCEDURE — 93000 ELECTROCARDIOGRAM COMPLETE: CPT | Performed by: NURSE PRACTITIONER

## 2021-06-28 PROCEDURE — 6360000002 HC RX W HCPCS: Performed by: PHYSICIAN ASSISTANT

## 2021-06-28 PROCEDURE — 99214 OFFICE O/P EST MOD 30 MIN: CPT | Performed by: NURSE PRACTITIONER

## 2021-06-28 RX ORDER — CYANOCOBALAMIN 1000 UG/ML
1000 INJECTION INTRAMUSCULAR; SUBCUTANEOUS ONCE
Status: CANCELLED
Start: 2021-07-05 | End: 2021-07-05

## 2021-06-28 RX ORDER — INDAPAMIDE 1.25 MG/1
TABLET, FILM COATED ORAL
Qty: 90 TABLET | Refills: 3 | Status: SHIPPED | OUTPATIENT
Start: 2021-06-28 | End: 2021-12-27 | Stop reason: SDUPTHER

## 2021-06-28 RX ORDER — ROSUVASTATIN CALCIUM 5 MG/1
TABLET, COATED ORAL
COMMUNITY
Start: 2021-06-02 | End: 2021-09-17 | Stop reason: SDUPTHER

## 2021-06-28 RX ORDER — CYANOCOBALAMIN 1000 UG/ML
1000 INJECTION INTRAMUSCULAR; SUBCUTANEOUS ONCE
Status: CANCELLED
Start: 2021-07-05

## 2021-06-28 RX ORDER — CYANOCOBALAMIN 1000 UG/ML
1000 INJECTION INTRAMUSCULAR; SUBCUTANEOUS ONCE
Status: COMPLETED | OUTPATIENT
Start: 2021-06-28 | End: 2021-06-28

## 2021-06-28 RX ADMIN — CYANOCOBALAMIN 1000 MCG: 1000 INJECTION, SOLUTION INTRAMUSCULAR; SUBCUTANEOUS at 11:20

## 2021-06-28 NOTE — PROGRESS NOTES
Subjective:     Encounter Date:06/28/2021      Patient ID: Misael Mcgill is a 81 y.o. male.    Chief Complaint: Follow-up CAD    History of Present Illness     The patient presents to follow-up regarding his coronary artery disease.  He was initially referred to Dr. Crawford for significant shortness of breath in March 2021.  Work-up included echocardiogram showing mild LV dysfunction, so a nuclear perfusion stress test was then performed and was abnormal.  For this reason, cardiac cath was performed on 5/5/2021, which showed multivessel coronary artery disease.  The most significant lesion appeared to be a focal 90% stenosis in the mid RCA that did correspond to the area of most severe ischemia noted on the stress test.  There was also moderate to severe stenosis in the proximal circumflex and a severe focal stenosis in the small mid circumflex extending into a small OM1 branch.  Since there was no left main or LAD disease, the benefit of CABG was felt to be indicated and Dr. Crawford proceeded with attempted PCI to the mid RCA.  However, given his stage III CKD, and the intervention itself proving to be quite difficult, the procedure was terminated as Dr. Crawford felt the patient had received the max amount of contrast he could receive safely in the setting of his CKD that day.  The patient did not undergo angioplasty or stent placement that day.  Atorvastatin dose was increased to 40 mg for ischemic event reduction and Ranexa 500 mg twice daily was ordered for symptom treatment.  The patient followed up with a telephone visit with Dr. Crawford approximately 1 week later and he reported it took him several days to recover from the procedure but he was back up and active at that time.  He thought his breathing was slightly better, but he still reported he was wearing out easily and getting severely short of breath with minimal to moderate activity.  He denied any chest discomfort.    On 5/24/2021 he returned and  underwent successful PCI to the mid RCA with 1 drug-eluting stent.  See details in cath report per Dr. Crawford below.  He was discharged home on dual antiplatelet therapy with aspirin and Plavix.  Prior to discharge, the patient reported he felt very well overall but stated his shortness of breath at rest was unchanged following PCI.  There were no signs of volume overload.  He was referred to begin cardiac rehab in 2 weeks.  He was instructed to follow-up with repeat CBC and BMP with Dr. Russo in 1 week given his decreased hemoglobin and platelets.  There were no signs of bleeding.    He did contact the office 1 to 2 days after discharge with complaints of leg aching and cramping.  Atorvastatin has been increased from 20 mg to 40 mg nightly approximately 3 weeks prior.  However, the patient never mentioned the leg cramping prior to discharge.  He was instructed to continue the current dose but if symptoms persisted, consider reduction of dose back to 20 mg per PCP.  He later saw his PCP on 6/2 and atorvastatin was discontinued and he was started on rosuvastatin 5 mg nightly.    Today the patient reports his lower extremity cramping has improved-he is unsure if this was due to medication change or not..  He actually reports his symptoms were more of a tenderness in his feet to the touch with associated bruising, that is now improved.  Of note, he denies any severe pain in his extremities or paleness or coldness of the extremities.  He tells me his shortness of breath may have improved slightly since PCI.  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  He states he notices his shortness of breath at rest, but he does not have any orthopnea, PND, edema or weight gain.  He continues to deny any chest pain.  Blood pressure is normal.    He is now following with hematology for thrombocytopenia.  He states he is about to begin  B12 injections.    The following portions of the patient's history were reviewed and updated as appropriate: allergies, current medications, past family history, past medical history, past social history, past surgical history and problem list.    Review of Systems   Constitutional: Negative for malaise/fatigue.   Cardiovascular: Negative for chest pain, claudication, dyspnea on exertion, leg swelling, near-syncope, orthopnea, palpitations, paroxysmal nocturnal dyspnea and syncope.   Respiratory: Positive for shortness of breath. Negative for cough.    Hematologic/Lymphatic: Does not bruise/bleed easily.   Musculoskeletal: Negative for falls.   Gastrointestinal: Negative for bloating.   Neurological: Negative for dizziness, light-headedness and weakness.       No Known Allergies    Current Outpatient Medications:   •  allopurinol (ZYLOPRIM) 100 MG tablet, allopurinol 100 mg tablet, Disp: , Rfl:   •  amLODIPine (NORVASC) 10 MG tablet, amlodipine 10 mg tablet, Disp: , Rfl:   •  aspirin 81 MG EC tablet, Take 81 mg by mouth Daily., Disp: , Rfl:   •  carvedilol (COREG) 3.125 MG tablet, Take 1 tablet by mouth 2 (Two) Times a Day With Meals., Disp: 60 tablet, Rfl: 11  •  cloNIDine (CATAPRES) 0.1 MG tablet, 0.1 mg Daily., Disp: , Rfl:   •  clopidogrel (PLAVIX) 75 MG tablet, Take 1 tablet by mouth Daily., Disp: 90 tablet, Rfl: 3  •  indapamide (LOZOL) 1.25 MG tablet, Take 1 tablet by mouth 2 (two) times a day., Disp: 180 tablet, Rfl: 4  •  metFORMIN (GLUCOPHAGE) 500 MG tablet, Take 2 tablets by mouth 2 (Two) Times a Day With Meals. HOLD FOR 48 HRS POST CATH, Disp: , Rfl:   •  nitroglycerin (NITROSTAT) 0.4 MG SL tablet, 1 under the tongue as needed for angina, may repeat q5mins for up three doses, Disp: 25 tablet, Rfl: 1  •  ranolazine (Ranexa) 500 MG 12 hr tablet, Take 1 tablet by mouth 2 (Two) Times a Day., Disp: 60 tablet, Rfl: 11  •  rosuvastatin (CRESTOR) 5 MG tablet, , Disp: , Rfl:   •  vitamin C (ASCORBIC ACID) 250 MG  "tablet, Take 250 mg by mouth., Disp: , Rfl:   •  vitamin E 1000 UNIT capsule, Take 1,000 Units by mouth., Disp: , Rfl:          Objective:    /70   Pulse 73   Ht 177.8 cm (70\")   Wt 110 kg (242 lb)   SpO2 96%   BMI 34.72 kg/m²        Vitals and nursing note reviewed.   Constitutional:       General: Not in acute distress.     Appearance: Well-developed and not in distress. Not diaphoretic.   Neck:      Vascular: No JVD.   Pulmonary:      Effort: Pulmonary effort is normal. No respiratory distress.      Breath sounds: Normal breath sounds.   Cardiovascular:      Normal rate. Regular rhythm.      Murmurs: There is no murmur.   Edema:     Peripheral edema absent.   Abdominal:      Tenderness: There is no abdominal tenderness.   Skin:     General: Skin is warm and dry.   Neurological:      Mental Status: Alert and oriented to person, place, and time.         Lab Review:   Lab Results   Component Value Date    CHOL 107 05/05/2021    CHLPL 205 (H) 03/01/2021    TRIG 152 (H) 05/05/2021    HDL 41 05/05/2021    LDL 40 05/05/2021     Lab Results   Component Value Date    GLUCOSE 107 06/08/2021    BUN 24 (H) 06/08/2021    CREATININE 1.4 (H) 06/08/2021    EGFRIFNONA 48 (A) 06/08/2021    EGFRIFAFRI 59 (L) 06/08/2021    BCR 17.2 05/25/2021    K 4.6 06/08/2021    CO2 24 06/08/2021    CALCIUM 9.5 06/08/2021    ALBUMIN 4.2 06/02/2021    AST 31 06/02/2021    ALT 43 (H) 06/02/2021     Lab Results   Component Value Date    HGBA1C 6.2 (H) 03/01/2021     Lab Results   Component Value Date    WBC 6.85 06/24/2021    HGB 13.4 (L) 06/24/2021    HCT 40.9 06/24/2021    .5 (H) 06/24/2021     (L) 06/24/2021             ECG 12 Lead    Date/Time: 6/28/2021 5:05 PM  Performed by: Dariela Chauhan APRN  Authorized by: Dariela Chauhan APRN   Comparison: compared with previous ECG from 4/27/2021  Similar to previous ECG  Rhythm: sinus rhythm  Ectopy: infrequent PVCs  BPM: 73  Conduction: 1st degree AV block and non-specific " intraventricular conduction delay  Other findings: non-specific ST-T wave changes        5/5/2021 cath :    Impression:  1.  Multi-vessel coronary artery disease, with severe focal 90% stenosis in the mid-RCA corresponding to the area of ischemia noted on the stress test.  There is also moderate to severe stenosis in the proximal circumflex vessel, and a severe focal stenosis in a small mid circumflex extending into OM1. 2. Unsuccessful percutaneous coronary intervention the mid-RCA   3. LVEF 40% with severe inferior hypokinesis         Plan:   1.   Given lack of severe left main or LAD disease, patient would not experience a mortality benefit from CABG so we will pursue percutaneous revascularization and medical therapy.  2.  TR band off in 2 hours and two hours bedrest, then discharge home today with groin precautions x1 week  3.  Continue aspirin 81mg daily indefinitely  4.  Increase atorvastatin dose from 20 up to 40 mg nightly  5.  Add Ranexa 500 mg p.o. twice daily  6.  Phone visit in 1 week to reassess symptoms and if still present, will discuss return for elective PCI to the RCA.  Will usent left femoral access, perhaps a long sheath if it is tortuous like the right iliac system, and initially attempt engagement RCA again with an AL type catheter due to severe angulation of the takeoff of the vessel      Electronically signed by Bill Crawford MD, 05/05/21, 11:19 AM CDT.    5/24/2021 cath :    Findings:     Hemodynamics:  Ao= 109/61, m78     Lesion characteristics  Pre-PCI: Mid-right coronary artery with a focal, eccentric 90% stenosis (type C), ROSE MARIE-2 flow distally  Post-PCI: Residual stenosis 0 %, ROSE MARIE-3 flow distally     Impression:  1.  Successful PCI to the mid-right coronary artery using a 3.5 x 15 mm drug-eluting stent     Plan:  1.  TR band often 2 hours/Bedrest for 2 hours  2.  Routine post-cath orders for the floor  3.  Remain on telemetry for monitoring overnight with anticipated discharge  tomorrow.  4.  Continue dual antiplatelet therapy for at least 12 months   5.  Aggressive risk factor modifications and lifestyle changes  6.  Cardiac rehab referral     Bill Crawford MD  05/24/21  10:14 CDT        Results for orders placed during the hospital encounter of 04/02/21    Adult Transthoracic Echo Complete w/ Color, Spectral and Contrast if necessary per protocol    Interpretation Summary  · Estimated left ventricular EF = 45% Left ventricular systolic function is mildly decreased. There is global hypokinesis.  · Left ventricular wall thickness is consistent with mild concentric hypertrophy.  · Left ventricular diastolic function is consistent with (grade I) impaired relaxation.  · Left atrial volume is mildly increased.  · Estimated right ventricular systolic pressure from tricuspid regurgitation is normal (<35 mmHg).  · No significant valvular pathology.      Assessment:      Problem List Items Addressed This Visit        Cardiac and Vasculature    Hypertension    Hyperlipidemia    Relevant Medications    rosuvastatin (CRESTOR) 5 MG tablet    Left ventricular systolic dysfunction    Coronary artery disease of native artery of native heart with stable angina pectoris (CMS/[facility]) - Primary    Overview     Stable angina / CCS III on OMT with known coronary artery disease (dx with cath 5/5/2021)-severe RCA stenosis status post successful PCI to the mid RCA with a 3.5 x 15 mm drug-eluting stent on 5/24/2021.  (Also has known moderate to severe stenosis in the proximal circumflex, and a severe focal stenosis in a small mid circumflex extending into OM1 noted on initial cath on 5/5)            Genitourinary and Reproductive     Stage 3a chronic kidney disease (CMS/[facility])          Plan:     1.  Coronary artery disease: Established problem, stable.  The patient reports he has resumed his usual exertional activities and denies any exertional shortness of breath.  He states he tends to notice his shortness of  breath at rest.  However, he denies any orthopnea, PND, edema or weight gain.  He reports possible slight improvement in his breathing following PCI.  -Continue aspirin 81 mg daily indefinitely  -Plavix 75 mg daily  -Begin cardiac rehab  -Due to muscle cramping the patient was recently transitioned from atorvastatin 40 mg nightly to rosuvastatin 5 mg nightly per PCP and symptoms improved although his symptoms were somewhat atypical of what might be expected as a side effect from statin medication.  -Continue Ranexa, Coreg  -Has not had to use as needed sublingual nitroglycerin    2.  Essential hypertension: Established problem, stable.  Controlled.  Currently on Coreg, clonidine, indapamide, amlodipine    3.  Mixed hyperlipidemia: Established problem, stable.  See above.  Continue rosuvastatin 5 mg nightly for now given his recent issues with leg cramping on high intensity dosed atorvastatin.  Most recent LDL controlled at 40-although, he was taking atorvastatin 20 mg nightly and tolerating that well at that time.  Discussed the goal of keeping the LDL less than 70.    4.  Mild LV systolic dysfunction: Currently compensated/euvolemic.  The patient reports no improvement in his shortness of breath with previous trial of Lasix.  Continue Coreg.  Consider addition of ARB/ACEI/ARNI in future if CKD remains stable (consider replacing clonidine, thiazide diuretic, and/or Norvasc).    Follow-up in 3 months, sooner with new or worsening symptoms

## 2021-06-29 RX ORDER — RANOLAZINE 500 MG/1
500 TABLET, EXTENDED RELEASE ORAL 2 TIMES DAILY
Qty: 180 TABLET | Refills: 4 | Status: SHIPPED | OUTPATIENT
Start: 2021-06-29 | End: 2022-06-30

## 2021-07-09 ENCOUNTER — HOSPITAL ENCOUNTER (OUTPATIENT)
Dept: INFUSION THERAPY | Age: 81
Discharge: HOME | End: 2021-07-09
Payer: MEDICARE

## 2021-07-09 DIAGNOSIS — D51.9 ANEMIA DUE TO VITAMIN B12 DEFICIENCY, UNSPECIFIED B12 DEFICIENCY TYPE: Primary | ICD-10-CM

## 2021-07-09 PROCEDURE — 6360000002 HC RX W HCPCS: Performed by: PHYSICIAN ASSISTANT

## 2021-07-09 PROCEDURE — 96372 THER/PROPH/DIAG INJ SC/IM: CPT

## 2021-07-09 RX ORDER — CYANOCOBALAMIN 1000 UG/ML
1000 INJECTION INTRAMUSCULAR; SUBCUTANEOUS ONCE
Status: COMPLETED | OUTPATIENT
Start: 2021-07-09 | End: 2021-07-09

## 2021-07-09 RX ORDER — CYANOCOBALAMIN 1000 UG/ML
1000 INJECTION INTRAMUSCULAR; SUBCUTANEOUS ONCE
Status: CANCELLED
Start: 2021-07-12 | End: 2021-07-12

## 2021-07-09 RX ORDER — CYANOCOBALAMIN 1000 UG/ML
1000 INJECTION INTRAMUSCULAR; SUBCUTANEOUS ONCE
Status: CANCELLED
Start: 2021-07-12

## 2021-07-09 RX ADMIN — CYANOCOBALAMIN 1000 MCG: 1000 INJECTION, SOLUTION INTRAMUSCULAR at 13:48

## 2021-07-16 ENCOUNTER — HOSPITAL ENCOUNTER (OUTPATIENT)
Dept: INFUSION THERAPY | Age: 81
Discharge: HOME | End: 2021-07-16
Payer: MEDICARE

## 2021-07-16 DIAGNOSIS — D51.9 ANEMIA DUE TO VITAMIN B12 DEFICIENCY, UNSPECIFIED B12 DEFICIENCY TYPE: Primary | ICD-10-CM

## 2021-07-16 PROCEDURE — 6360000002 HC RX W HCPCS: Performed by: PHYSICIAN ASSISTANT

## 2021-07-16 PROCEDURE — 96372 THER/PROPH/DIAG INJ SC/IM: CPT

## 2021-07-16 RX ORDER — CYANOCOBALAMIN 1000 UG/ML
1000 INJECTION INTRAMUSCULAR; SUBCUTANEOUS ONCE
Status: CANCELLED
Start: 2021-07-23 | End: 2021-07-23

## 2021-07-16 RX ORDER — CYANOCOBALAMIN 1000 UG/ML
1000 INJECTION INTRAMUSCULAR; SUBCUTANEOUS ONCE
Status: CANCELLED
Start: 2021-07-23

## 2021-07-16 RX ORDER — CYANOCOBALAMIN 1000 UG/ML
1000 INJECTION INTRAMUSCULAR; SUBCUTANEOUS ONCE
Status: COMPLETED | OUTPATIENT
Start: 2021-07-16 | End: 2021-07-16

## 2021-07-16 RX ADMIN — CYANOCOBALAMIN 1000 MCG: 1000 INJECTION, SOLUTION INTRAMUSCULAR; SUBCUTANEOUS at 14:23

## 2021-07-23 ENCOUNTER — APPOINTMENT (OUTPATIENT)
Dept: INFUSION THERAPY | Age: 81
End: 2021-07-23
Payer: MEDICARE

## 2021-07-23 RX ORDER — CARVEDILOL 3.12 MG/1
3.12 TABLET ORAL 2 TIMES DAILY WITH MEALS
Qty: 180 TABLET | Refills: 3 | Status: SHIPPED | OUTPATIENT
Start: 2021-07-23 | End: 2022-07-18 | Stop reason: SDUPTHER

## 2021-07-29 ENCOUNTER — HOSPITAL ENCOUNTER (OUTPATIENT)
Dept: INFUSION THERAPY | Age: 81
Discharge: HOME | End: 2021-07-29
Payer: MEDICARE

## 2021-07-29 DIAGNOSIS — D51.9 ANEMIA DUE TO VITAMIN B12 DEFICIENCY, UNSPECIFIED B12 DEFICIENCY TYPE: Primary | ICD-10-CM

## 2021-07-29 PROCEDURE — 6360000002 HC RX W HCPCS: Performed by: PHYSICIAN ASSISTANT

## 2021-07-29 PROCEDURE — 96372 THER/PROPH/DIAG INJ SC/IM: CPT

## 2021-07-29 RX ORDER — CYANOCOBALAMIN 1000 UG/ML
1000 INJECTION INTRAMUSCULAR; SUBCUTANEOUS ONCE
Status: CANCELLED
Start: 2021-07-30 | End: 2021-07-30

## 2021-07-29 RX ORDER — CYANOCOBALAMIN 1000 UG/ML
1000 INJECTION INTRAMUSCULAR; SUBCUTANEOUS ONCE
Status: CANCELLED
Start: 2021-07-30

## 2021-07-29 RX ORDER — CYANOCOBALAMIN 1000 UG/ML
1000 INJECTION INTRAMUSCULAR; SUBCUTANEOUS ONCE
Status: COMPLETED | OUTPATIENT
Start: 2021-07-29 | End: 2021-07-29

## 2021-07-29 RX ADMIN — CYANOCOBALAMIN 1000 MCG: 1000 INJECTION, SOLUTION INTRAMUSCULAR; SUBCUTANEOUS at 14:13

## 2021-08-12 ENCOUNTER — HOSPITAL ENCOUNTER (OUTPATIENT)
Dept: INFUSION THERAPY | Age: 81
Discharge: HOME | End: 2021-08-12
Payer: MEDICARE

## 2021-08-12 DIAGNOSIS — D51.9 ANEMIA DUE TO VITAMIN B12 DEFICIENCY, UNSPECIFIED B12 DEFICIENCY TYPE: Primary | ICD-10-CM

## 2021-08-12 PROCEDURE — 96372 THER/PROPH/DIAG INJ SC/IM: CPT

## 2021-08-12 PROCEDURE — 6360000002 HC RX W HCPCS

## 2021-08-12 RX ORDER — CYANOCOBALAMIN 1000 UG/ML
INJECTION INTRAMUSCULAR; SUBCUTANEOUS
Status: COMPLETED
Start: 2021-08-12 | End: 2021-08-12

## 2021-08-12 RX ORDER — CYANOCOBALAMIN 1000 UG/ML
1000 INJECTION INTRAMUSCULAR; SUBCUTANEOUS ONCE
Status: COMPLETED | OUTPATIENT
Start: 2021-08-12 | End: 2021-08-12

## 2021-08-12 RX ORDER — CYANOCOBALAMIN 1000 UG/ML
1000 INJECTION INTRAMUSCULAR; SUBCUTANEOUS ONCE
Status: CANCELLED
Start: 2021-08-19 | End: 2021-08-19

## 2021-08-12 RX ORDER — CYANOCOBALAMIN 1000 UG/ML
1000 INJECTION INTRAMUSCULAR; SUBCUTANEOUS ONCE
Status: CANCELLED
Start: 2021-08-19

## 2021-08-12 RX ADMIN — CYANOCOBALAMIN 1000 MCG: 1000 INJECTION, SOLUTION INTRAMUSCULAR; SUBCUTANEOUS at 12:54

## 2021-08-12 RX ADMIN — CYANOCOBALAMIN 1000 MCG: 1000 INJECTION INTRAMUSCULAR; SUBCUTANEOUS at 12:54

## 2021-08-23 RX ORDER — ROSUVASTATIN CALCIUM 5 MG/1
TABLET, COATED ORAL
Qty: 90 TABLET | Refills: 3 | Status: SHIPPED | OUTPATIENT
Start: 2021-08-23 | End: 2022-09-07 | Stop reason: SDUPTHER

## 2021-08-26 ENCOUNTER — HOSPITAL ENCOUNTER (OUTPATIENT)
Dept: INFUSION THERAPY | Age: 81
Discharge: HOME | End: 2021-08-26
Payer: MEDICARE

## 2021-08-26 DIAGNOSIS — D51.9 ANEMIA DUE TO VITAMIN B12 DEFICIENCY, UNSPECIFIED B12 DEFICIENCY TYPE: Primary | ICD-10-CM

## 2021-08-26 PROCEDURE — 96372 THER/PROPH/DIAG INJ SC/IM: CPT

## 2021-08-26 PROCEDURE — 6360000002 HC RX W HCPCS: Performed by: PHYSICIAN ASSISTANT

## 2021-08-26 RX ORDER — CYANOCOBALAMIN 1000 UG/ML
1000 INJECTION INTRAMUSCULAR; SUBCUTANEOUS ONCE
Status: CANCELLED
Start: 2021-09-13 | End: 2021-09-13

## 2021-08-26 RX ORDER — CYANOCOBALAMIN 1000 UG/ML
1000 INJECTION INTRAMUSCULAR; SUBCUTANEOUS ONCE
Status: CANCELLED
Start: 2021-09-13

## 2021-08-26 RX ORDER — CYANOCOBALAMIN 1000 UG/ML
1000 INJECTION INTRAMUSCULAR; SUBCUTANEOUS ONCE
Status: COMPLETED | OUTPATIENT
Start: 2021-08-26 | End: 2021-08-26

## 2021-08-26 RX ADMIN — CYANOCOBALAMIN 1000 MCG: 1000 INJECTION, SOLUTION INTRAMUSCULAR; SUBCUTANEOUS at 14:37

## 2021-09-10 RX ORDER — FUROSEMIDE 20 MG/1
TABLET ORAL
Qty: 90 TABLET | Refills: 3 | Status: SHIPPED | OUTPATIENT
Start: 2021-09-10 | End: 2021-10-03

## 2021-09-10 RX ORDER — AMLODIPINE BESYLATE 10 MG/1
TABLET ORAL
Qty: 90 TABLET | Refills: 3 | Status: SHIPPED | OUTPATIENT
Start: 2021-09-10 | End: 2022-04-14

## 2021-09-17 ENCOUNTER — OFFICE VISIT (OUTPATIENT)
Dept: CARDIOLOGY | Facility: CLINIC | Age: 81
End: 2021-09-17

## 2021-09-17 VITALS
HEART RATE: 71 BPM | DIASTOLIC BLOOD PRESSURE: 83 MMHG | SYSTOLIC BLOOD PRESSURE: 148 MMHG | HEIGHT: 70 IN | BODY MASS INDEX: 34.65 KG/M2 | WEIGHT: 242 LBS | OXYGEN SATURATION: 98 %

## 2021-09-17 DIAGNOSIS — E78.2 MIXED HYPERLIPIDEMIA: ICD-10-CM

## 2021-09-17 DIAGNOSIS — I51.9 LEFT VENTRICULAR SYSTOLIC DYSFUNCTION: ICD-10-CM

## 2021-09-17 DIAGNOSIS — I10 ESSENTIAL HYPERTENSION: ICD-10-CM

## 2021-09-17 DIAGNOSIS — I25.118 CORONARY ARTERY DISEASE OF NATIVE ARTERY OF NATIVE HEART WITH STABLE ANGINA PECTORIS (HCC): Primary | ICD-10-CM

## 2021-09-17 PROCEDURE — 99214 OFFICE O/P EST MOD 30 MIN: CPT | Performed by: INTERNAL MEDICINE

## 2021-09-17 PROCEDURE — 93000 ELECTROCARDIOGRAM COMPLETE: CPT | Performed by: INTERNAL MEDICINE

## 2021-09-17 RX ORDER — FUROSEMIDE 20 MG/1
TABLET ORAL DAILY
COMMUNITY
Start: 2021-07-12

## 2021-09-17 RX ORDER — ROSUVASTATIN CALCIUM 10 MG/1
10 TABLET, COATED ORAL NIGHTLY
Qty: 90 TABLET | Refills: 3 | Status: SHIPPED | OUTPATIENT
Start: 2021-09-17 | End: 2022-04-04 | Stop reason: SDUPTHER

## 2021-09-17 NOTE — PROGRESS NOTES
Subjective:     Encounter Date:09/17/2021      Patient ID: Misael Mcgill is a 81 y.o. male.    Chief Complaint: routine f/u cad  History of Present Illness  Mr. Mcgill returns today for follow-up after having just been seen in our office on 06/28/2021 by Dariela Chauhan. At that visit, she recommended he start cardiac rehab. She mentioned that his PCP had switched him from atorvastatin 40 mg to rosuvastatin 5 mg nightly for some suspected myalgias or cramping at night, but Dariela did mention that change had improved his symptoms, though the symptoms themselves are somewhat atypical from those expected with statins. She mentioned that he was compensated on exam, and was euvolemic, and that his shortness of breath had not improved whatsoever with the previous trial of Lasix. It does not appear as though any other significant changes were made or testing recommended at that time.    By way of review, I met him in 03/2021 for shortness of breath. An echocardiogram showed mild dysfunction, so I then performed a nuclear perfusion test that was also abnormal, so I then performed a catheterization on 05/05/2021, showing multivessel disease, the most severe of which was a focal 90% mid right coronary stenosis (that did, in fact, correspond to the area of abnormality on stress test). He also had a moderate to severe proximal circumflex stenosis, and a severe focal stenosis in the mid portion of the circumflex extending into OM1 that was rather small. There was no left main or LAD disease, so the benefit of CABG was not there, so I did proceed with PCI of the RCA. I had to stop due to the complexity of the procedure, and to conserve contrast then given his chronic kidney disease. Thus, he came back to the cath lab on 05/24/2021, and I did successfully place a drug-eluting stent to the right coronary artery. At the last visit with Dariela, he mentioned that his shortness of breath was much better.    Mr. Mcgill presents to clinic today  accompanied by his wife. He states that he is unsure if the stent made his breathing better, but he does not think so. 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  He denies any orthopnea, PND, rapid weight changes, or significant leg swelling.    He reports that he woke up the other night with severe shortness of breath, so he applied the pulse oximeter, and his oxygen saturation was 92%. He states that he has a blood pressure cuff at home, but he does not trust it.      The following portions of the patient's history were reviewed and updated as appropriate: allergies, current medications, past family history, past medical history, past social history, past surgical history and problem list.    Review of Systems   Constitutional: Negative for malaise/fatigue.   Cardiovascular: Negative for chest pain, claudication, dyspnea on exertion, leg swelling, near-syncope, orthopnea, palpitations, paroxysmal nocturnal dyspnea and syncope.   Respiratory: Negative for shortness of breath.    Hematologic/Lymphatic: Does not bruise/bleed easily.           Current Outpatient Medications:   •  allopurinol (ZYLOPRIM) 100 MG tablet, allopurinol 100 mg tablet, Disp: , Rfl:   •  amLODIPine (NORVASC) 10 MG tablet, amlodipine 10 mg tablet, Disp: , Rfl:   •  aspirin 81 MG EC tablet, Take 81 mg by mouth Daily., Disp: , Rfl:   •  carvedilol (COREG) 3.125 MG tablet, Take 1 tablet by mouth 2 (Two) Times a Day With Meals., Disp: 180 tablet, Rfl: 3  •  cloNIDine (CATAPRES) 0.1 MG tablet, 0.1 mg Daily., Disp: , Rfl:   •  clopidogrel (PLAVIX) 75 MG tablet, Take 1 tablet by mouth Daily., Disp: 90 tablet, Rfl: 3  •  furosemide (LASIX) 20 MG tablet, , Disp: , Rfl:   •  indapamide (LOZOL) 1.25 MG tablet, Take 1 tablet by mouth 2 (two) times a day., Disp: 180 tablet, Rfl: 4  •  metFORMIN (GLUCOPHAGE) 500 MG tablet, Take 2 tablets by mouth 2 (Two) Times a Day With Meals. HOLD FOR 48  HRS POST CATH, Disp: , Rfl:   •  nitroglycerin (NITROSTAT) 0.4 MG SL tablet, 1 under the tongue as needed for angina, may repeat q5mins for up three doses, Disp: 25 tablet, Rfl: 1  •  ranolazine (Ranexa) 500 MG 12 hr tablet, Take 1 tablet by mouth 2 (Two) Times a Day., Disp: 180 tablet, Rfl: 4  •  rosuvastatin (CRESTOR) 10 MG tablet, Take 1 tablet by mouth Every Night., Disp: 90 tablet, Rfl: 3  •  vitamin C (ASCORBIC ACID) 250 MG tablet, Take 250 mg by mouth., Disp: , Rfl:   •  vitamin E 1000 UNIT capsule, Take 1,000 Units by mouth., Disp: , Rfl:        Objective:      Vitals:    09/17/21 1011   BP: 148/83   Pulse: 71   SpO2: 98%     Vitals and nursing note reviewed.   Constitutional:       General: Not in acute distress.     Appearance: Not in distress.   Neck:      Vascular: No JVD or JVR. JVD normal.   Pulmonary:      Effort: Pulmonary effort is normal.      Breath sounds: Normal breath sounds.   Cardiovascular:      Normal rate. Regular rhythm.      Murmurs: There is no murmur.      No gallop. No rub.   Pulses:     Intact distal pulses.   Skin:     General: Skin is warm and dry.   Neurological:      Mental Status: Alert, oriented to person, place, and time and oriented to person, place and time.         Lab Review:         ECG 12 Lead    Date/Time: 9/17/2021 10:18 AM  Performed by: Bill Crawford MD  Authorized by: Bill Crawford MD   Comparison: compared with previous ECG from 6/28/2021  Similar to previous ECG  Rhythm: sinus rhythm  Ectopy: infrequent PVCs  Conduction: 1st degree AV block  Conduction comments: Borderline nonspecific intraventricular conduction delay.  QRS axis: right (borderline)    Clinical impression: abnormal EKG              Assessment/Plan:     Problem List Items Addressed This Visit        Cardiac and Vasculature    Hypertension    Relevant Medications    furosemide (LASIX) 20 MG tablet    Hyperlipidemia    Relevant Medications    rosuvastatin (CRESTOR) 10 MG tablet    Left  ventricular systolic dysfunction    Coronary artery disease of native artery of native heart with stable angina pectoris (CMS/HCC) - Primary    Overview     Stable angina / CCS III on OMT with known coronary artery disease (dx with cath 5/5/2021)-severe RCA stenosis status post successful PCI to the mid RCA with a 3.5 x 15 mm drug-eluting stent on 5/24/2021.  (Also has known moderate to severe stenosis in the proximal circumflex, and a severe focal stenosis in a small mid circumflex extending into OM1 noted on initial cath on 5/5)             Recommendations/plans:  1. Coronary artery disease: Stable. He is not really complaining of any shortness of breath now nor any chest discomfort, so there is no reason to consider further revascularization.  - Continue aspirin 81 mg daily indefinitely without interruption.  - Continue Plavix 75 mg daily until at least 05/2022.  - Continue statin with instructions as per below.  - Continue Coreg, and Ranexa.  - Has nitroglycerin to use sublingual as-needed.    2. Essential hypertension: Slightly elevated, but generally well-controlled, so continue current regimen, which includes Coreg, Indapamide, amlodipine, and clonidine.    3. Mixed hyperlipidemia: He is tolerating rosuvastatin 5 mg nightly well at this point, after having previously had lifestyle-limiting myalgias on atorvastatin 40 mg. He is amenable now to trying an increased dose of rosuvastatin, so I have instructed him to double his current dose, and sent in a new prescription for 10 mg tablets with instructions to take 10 mg at bedtime. I also provided written instructions for him to call us back in 1 month if he is tolerating this dose, at which point we would then consider increasing it to the goal dose of 20 mg at bedtime, and strive to maintain a goal LDL < 70.    4. Mild LV dysfunction (EF 45% on echocardiogram 04/02/2021): No signs or symptoms of heart failure. Continue current medications.    Follow up in 6  months or sooner with new or worsening symptoms.          Transcribed from ambient dictation for Bill Crawford MD by Katerina Alvarado.  09/17/21   11:40 CDT    I Bill Crawford MD have personally performed the services described in this document as scribed by the above individual, and it is both accurate and complete.   I have edited each component as needed.    Bill Crawford MD  9/20/2021  09:59 CDT

## 2021-09-17 NOTE — PATIENT INSTRUCTIONS
PLEASE CALL US BACK IN 1 MONTH AND LET US KNOW HOW YOU ARE TOLERATING THE HIGHER DOSE OF ROSUVASTATIN

## 2021-09-20 PROBLEM — R94.39 ABNORMAL NUCLEAR STRESS TEST: Status: RESOLVED | Noted: 2021-04-27 | Resolved: 2021-09-20

## 2021-09-23 ENCOUNTER — HOSPITAL ENCOUNTER (OUTPATIENT)
Dept: INFUSION THERAPY | Age: 81
Discharge: HOME | End: 2021-09-23
Payer: MEDICARE

## 2021-09-23 DIAGNOSIS — D51.9 ANEMIA DUE TO VITAMIN B12 DEFICIENCY, UNSPECIFIED B12 DEFICIENCY TYPE: Primary | ICD-10-CM

## 2021-09-23 PROCEDURE — 6360000002 HC RX W HCPCS: Performed by: PHYSICIAN ASSISTANT

## 2021-09-23 PROCEDURE — 96372 THER/PROPH/DIAG INJ SC/IM: CPT

## 2021-09-23 RX ORDER — CYANOCOBALAMIN 1000 UG/ML
1000 INJECTION INTRAMUSCULAR; SUBCUTANEOUS ONCE
Start: 2021-10-11

## 2021-09-23 RX ORDER — CYANOCOBALAMIN 1000 UG/ML
1000 INJECTION INTRAMUSCULAR; SUBCUTANEOUS ONCE
Status: COMPLETED
Start: 2021-09-23 | End: 2021-09-23

## 2021-09-23 RX ADMIN — CYANOCOBALAMIN 1000 MCG: 1000 INJECTION, SOLUTION INTRAMUSCULAR; SUBCUTANEOUS at 14:16

## 2021-09-27 DIAGNOSIS — E11.9 TYPE 2 DIABETES MELLITUS WITHOUT COMPLICATION, WITHOUT LONG-TERM CURRENT USE OF INSULIN (HCC): ICD-10-CM

## 2021-09-27 DIAGNOSIS — I10 ESSENTIAL HYPERTENSION: Primary | ICD-10-CM

## 2021-09-27 LAB
ALBUMIN SERPL-MCNC: 4.5 G/DL (ref 3.5–5.2)
ALP BLD-CCNC: 75 U/L (ref 40–130)
ALT SERPL-CCNC: 20 U/L (ref 5–41)
ANION GAP SERPL CALCULATED.3IONS-SCNC: 12 MMOL/L (ref 7–19)
AST SERPL-CCNC: 17 U/L (ref 5–40)
BILIRUB SERPL-MCNC: 0.4 MG/DL (ref 0.2–1.2)
BUN BLDV-MCNC: 23 MG/DL (ref 8–23)
CALCIUM SERPL-MCNC: 9.3 MG/DL (ref 8.8–10.2)
CHLORIDE BLD-SCNC: 103 MMOL/L (ref 98–111)
CO2: 24 MMOL/L (ref 22–29)
CREAT SERPL-MCNC: 1.3 MG/DL (ref 0.5–1.2)
GFR AFRICAN AMERICAN: >59
GFR NON-AFRICAN AMERICAN: 53
GLUCOSE BLD-MCNC: 119 MG/DL (ref 74–109)
HBA1C MFR BLD: 5.6 % (ref 4–6)
POTASSIUM SERPL-SCNC: 4.6 MMOL/L (ref 3.5–5)
SODIUM BLD-SCNC: 139 MMOL/L (ref 136–145)
TOTAL PROTEIN: 7 G/DL (ref 6.6–8.7)

## 2021-09-30 ENCOUNTER — OFFICE VISIT (OUTPATIENT)
Dept: PRIMARY CARE CLINIC | Age: 81
End: 2021-09-30
Payer: MEDICARE

## 2021-09-30 VITALS
TEMPERATURE: 98 F | RESPIRATION RATE: 18 BRPM | WEIGHT: 236.4 LBS | HEART RATE: 53 BPM | BODY MASS INDEX: 33.84 KG/M2 | HEIGHT: 70 IN | OXYGEN SATURATION: 99 % | SYSTOLIC BLOOD PRESSURE: 130 MMHG | DIASTOLIC BLOOD PRESSURE: 80 MMHG

## 2021-09-30 DIAGNOSIS — Z91.81 AT HIGH RISK FOR FALLS: ICD-10-CM

## 2021-09-30 DIAGNOSIS — E11.9 TYPE 2 DIABETES MELLITUS WITHOUT COMPLICATION, WITHOUT LONG-TERM CURRENT USE OF INSULIN (HCC): Primary | ICD-10-CM

## 2021-09-30 DIAGNOSIS — I10 ESSENTIAL HYPERTENSION: ICD-10-CM

## 2021-09-30 DIAGNOSIS — E78.5 HYPERLIPIDEMIA, UNSPECIFIED HYPERLIPIDEMIA TYPE: ICD-10-CM

## 2021-09-30 DIAGNOSIS — D51.9 ANEMIA DUE TO VITAMIN B12 DEFICIENCY, UNSPECIFIED B12 DEFICIENCY TYPE: ICD-10-CM

## 2021-09-30 PROBLEM — I25.118 CORONARY ARTERY DISEASE OF NATIVE ARTERY OF NATIVE HEART WITH STABLE ANGINA PECTORIS (HCC): Status: ACTIVE | Noted: 2021-05-12

## 2021-09-30 PROCEDURE — 1036F TOBACCO NON-USER: CPT | Performed by: FAMILY MEDICINE

## 2021-09-30 PROCEDURE — G8427 DOCREV CUR MEDS BY ELIG CLIN: HCPCS | Performed by: FAMILY MEDICINE

## 2021-09-30 PROCEDURE — 4040F PNEUMOC VAC/ADMIN/RCVD: CPT | Performed by: FAMILY MEDICINE

## 2021-09-30 PROCEDURE — 1123F ACP DISCUSS/DSCN MKR DOCD: CPT | Performed by: FAMILY MEDICINE

## 2021-09-30 PROCEDURE — G8417 CALC BMI ABV UP PARAM F/U: HCPCS | Performed by: FAMILY MEDICINE

## 2021-09-30 PROCEDURE — 99213 OFFICE O/P EST LOW 20 MIN: CPT | Performed by: FAMILY MEDICINE

## 2021-09-30 RX ORDER — ZOSTER VACCINE RECOMBINANT, ADJUVANTED 50 MCG/0.5
0.5 KIT INTRAMUSCULAR SEE ADMIN INSTRUCTIONS
Qty: 0.5 ML | Refills: 0 | Status: SHIPPED | OUTPATIENT
Start: 2021-09-30 | End: 2022-03-02

## 2021-09-30 NOTE — PATIENT INSTRUCTIONS
Patient Education        Preventing Outdoor Falls: Care Instructions  Your Care Instructions     Worries about falls don't need to keep you indoors. Outdoor activities like walking have big benefits for your health. You will need to watch your step and learn a few safety measures. If you are worried about having a fall outdoors, ask your doctor about exercises, classes, or physical therapy that may help. You can learn ways to gain strength, flexibility, and balance. Ask if it might help to use a cane or walker. You can make your time outdoors safer with a few simple measures. Follow-up care is a key part of your treatment and safety. Be sure to make and go to all appointments, and call your doctor if you are having problems. It's also a good idea to know your test results and keep a list of the medicines you take. How can you prevent falls outdoors? · Wear shoes with firm soles and low heels. If you have to walk on an icy surface, use grippers that can be worn over your shoes in bad weather. · Be extra careful if weather is bad. Walk on the grass when the sidewalks are slick. If you live in a place that gets snow and ice in the winter, sprinkle salt on slippery stairs and sidewalks. · Be careful getting on or off buses and trains or getting in and out of cars. If handrails are available, use them. · Be careful when you cross the street. Look for crosswalks or places where curb cuts or ramps are present. · Try not to hurry, especially if you are carrying something. · Be cautious in parking lots or garages. There may be curbs or changes in pavement, or the height of the pavement may vary. · Make sure to wear the correct eyeglasses, if you need them. Reading glasses or bifocals can make it harder to see hazards that might be in your way. · If you are walking outdoors for exercise, try to:  ? Walk in well-lighted, well-maintained areas.  These include high school or college tracks, shopping malls, and public spaces. ? Walk with a partner. ? Watch out for cracked sidewalks, curbs, changes in the height of the pavement, exposed tree roots, and debris such as fallen leaves or branches. Where can you learn more? Go to https://parag.[employer]. org and sign in to your [employer] account. Enter X983 in the [employer] box to learn more about \"Preventing Outdoor Falls: Care Instructions. \"     If you do not have an account, please click on the \"Sign Up Now\" link. Current as of: December 7, 2020               Content Version: 13.0  © 4333-5559 Healthwise, Incorporated. Care instructions adapted under license by [facility] ([facility]). If you have questions about a medical condition or this instruction, always ask your healthcare professional. [address] any warranty or liability for your use of this information.          Wt Readings from Last 3 Encounters:   09/30/21 236 lb 6.4 oz (107.2 kg)   06/24/21 241 lb (109.3 kg)   06/02/21 250 lb (113.4 kg)

## 2021-09-30 NOTE — PROGRESS NOTES
or Organization Meetings:     Marital Status:    Intimate Partner Violence:     Fear of Current or Ex-Partner:     Emotionally Abused:     Physically Abused:     Sexually Abused:        Review of Systems   Constitutional: Negative for activity change and fatigue. Eyes: Negative for visual disturbance. Respiratory: Negative. Cardiovascular: Negative. Gastrointestinal: Negative for diarrhea. Endocrine: Negative for polydipsia, polyphagia and polyuria. Genitourinary: Negative for dysuria. Musculoskeletal: Positive for arthralgias. Neurological: Negative for weakness and numbness. Hematological: Negative. Psychiatric/Behavioral: Negative. Current Outpatient Medications on File Prior to Visit   Medication Sig Dispense Refill    amLODIPine (NORVASC) 10 MG tablet TAKE 1 TABLET EVERY NIGHT 90 tablet 3    rosuvastatin (CRESTOR) 5 MG tablet 1 tablet by mouth at bedtime for high cholesterol 90 tablet 3    indapamide (LOZOL) 1.25 MG tablet TAKE 1 TABLET EVERY DAY 90 tablet 3    metFORMIN (GLUCOPHAGE) 500 MG tablet Take 2 tablets by mouth 2 times daily (with meals) For diabetes 180 tablet 3    clopidogrel (PLAVIX) 75 MG tablet Take 75 mg by mouth daily      carvedilol (COREG) 3.125 MG tablet Take 3.125 mg by mouth 2 times daily (with meals)      ranolazine (RANEXA) 500 MG extended release tablet Take 500 mg by mouth 2 times daily      cloNIDine (CATAPRES) 0.1 MG tablet Take 1 tablet by mouth 2 times daily 180 tablet 3    allopurinol (ZYLOPRIM) 100 MG tablet TAKE 1 TABLET TWICE DAILY 180 tablet 3    blood glucose test strips (CONTOUR NEXT TEST) strip 1 each by In Vitro route daily As needed. 100 each 3    aspirin 81 MG tablet Take 81 mg by mouth daily      Ascorbic Acid (VITAMIN C) 250 MG tablet Take 500 mg by mouth daily       vitamin E 1000 UNITS capsule Take 400 Units by mouth daily        No current facility-administered medications on file prior to visit. OBJECTIVE:    Wt Readings from Last 3 Encounters:   09/30/21 236 lb 6.4 oz (107.2 kg)   06/24/21 241 lb (109.3 kg)   06/02/21 250 lb (113.4 kg)       /80 (Site: Left Upper Arm, Position: Sitting, Cuff Size: Large Adult)   Pulse 53   Temp 98 °F (36.7 °C) (Temporal)   Resp 18   Ht 5' 10\" (1.778 m)   Wt 236 lb 6.4 oz (107.2 kg)   SpO2 99%   BMI 33.92 kg/m²     Physical Exam  Vitals and nursing note reviewed. Constitutional:       General: He is not in acute distress. Appearance: Normal appearance. He is well-developed. He is not ill-appearing. HENT:      Head: Normocephalic. Right Ear: Tympanic membrane, ear canal and external ear normal.      Left Ear: Tympanic membrane, ear canal and external ear normal.   Eyes:      Extraocular Movements: Extraocular movements intact. Conjunctiva/sclera: Conjunctivae normal.      Pupils: Pupils are equal, round, and reactive to light. Cardiovascular:      Rate and Rhythm: Normal rate and regular rhythm. Pulses: Normal pulses. Heart sounds: Normal heart sounds. Pulmonary:      Effort: Pulmonary effort is normal.      Breath sounds: Normal breath sounds. Musculoskeletal:         General: Normal range of motion. Cervical back: Normal range of motion and neck supple. Lymphadenopathy:      Cervical: No cervical adenopathy. Skin:     General: Skin is warm and dry. Neurological:      General: No focal deficit present. Mental Status: He is alert and oriented to person, place, and time. Psychiatric:         Mood and Affect: Mood normal.         Behavior: Behavior normal.         Thought Content: Thought content normal.         Judgment: Judgment normal.         ASSESSMENT:    1. Type 2 diabetes mellitus without complication, without long-term current use of insulin ([address]    2. At high risk for falls    3. Essential hypertension    4. Hyperlipidemia, unspecified hyperlipidemia type    5.  Anemia due to vitamin B12 deficiency, unspecified B12 deficiency type          PLAN:    MEDICATIONS:  Orders Placed This Encounter   Medications    zoster recombinant adjuvanted vaccine (SHINGRIX) 50 MCG/0.5ML SUSR injection     Sig: Inject 0.5 mLs into the muscle See Admin Instructions 1 dose now and repeat in 2-6 months     Dispense:  0.5 mL     Refill:  0       ORDERS:  Orders Placed This Encounter   Procedures    CBC    Comprehensive Metabolic Panel    Hemoglobin A1C    Lipid Panel    Vitamin B12     We will contact him when we get results of all of his blood work. He seems to be doing well. He is having no polyuria polyphagia or polydipsia. Continue to check glucoses once a day or sooner if he has any problems. He is controlling his diabetes with diet, exercise and Metformin. Follow-up:  Return in about 6 months (around 3/30/2022) for Annual wellness and fasting blood. PATIENT INSTRUCTIONS:  Patient Instructions       Patient Education        Preventing Outdoor Falls: Care Instructions  Your Care Instructions     Worries about falls don't need to keep you indoors. Outdoor activities like walking have big benefits for your health. You will need to watch your step and learn a few safety measures. If you are worried about having a fall outdoors, ask your doctor about exercises, classes, or physical therapy that may help. You can learn ways to gain strength, flexibility, and balance. Ask if it might help to use a cane or walker. You can make your time outdoors safer with a few simple measures. Follow-up care is a key part of your treatment and safety. Be sure to make and go to all appointments, and call your doctor if you are having problems. It's also a good idea to know your test results and keep a list of the medicines you take. How can you prevent falls outdoors? · Wear shoes with firm soles and low heels. If you have to walk on an icy surface, use grippers that can be worn over your shoes in bad weather.   · Be extra careful if weather is bad. Walk on the grass when the sidewalks are slick. If you live in a place that gets snow and ice in the winter, sprinkle salt on slippery stairs and sidewalks. · Be careful getting on or off buses and trains or getting in and out of cars. If handrails are available, use them. · Be careful when you cross the street. Look for crosswalks or places where curb cuts or ramps are present. · Try not to hurry, especially if you are carrying something. · Be cautious in parking lots or garages. There may be curbs or changes in pavement, or the height of the pavement may vary. · Make sure to wear the correct eyeglasses, if you need them. Reading glasses or bifocals can make it harder to see hazards that might be in your way. · If you are walking outdoors for exercise, try to:  ? Walk in well-lighted, well-maintained areas. These include high school or college tracks, shopping malls, and public spaces. ? Walk with a partner. ? Watch out for cracked sidewalks, curbs, changes in the height of the pavement, exposed tree roots, and debris such as fallen leaves or branches. Where can you learn more? Go to https://Recommendi.[employer]board. org and sign in to your [employer] account. Enter I904 in the [facility] box to learn more about \"Preventing Outdoor Falls: Care Instructions. \"     If you do not have an account, please click on the \"Sign Up Now\" link. Current as of: December 7, 2020               Content Version: 13.0  © 2466-8156 Healthwise, Incorporated. Care instructions adapted under license by [facility] ([facility]). If you have questions about a medical condition or this instruction, always ask your healthcare professional. [place] [address] any warranty or liability for your use of this information.          Wt Readings from Last 3 Encounters:   09/30/21 236 lb 6.4 oz (107.2 kg)   06/24/21 241 lb (109.3 kg)   06/02/21 250 lb (113.4 kg)           EMR Dragon/transcription disclaimer:  Much of this encounter note is electronic transcription/translation of spoken language to printed texts. The electronic translation of spoken language may be erroneous, or at times, nonsensical words or phrases may beinadvertently transcribed. Although I have reviewed the note for such errors, some may still exist.                      .

## 2021-10-03 ASSESSMENT — ENCOUNTER SYMPTOMS
DIARRHEA: 0
RESPIRATORY NEGATIVE: 1

## 2021-10-07 RX ORDER — RANOLAZINE 500 MG/1
500 TABLET, EXTENDED RELEASE ORAL 2 TIMES DAILY
Qty: 14 TABLET | Refills: 0 | Status: SHIPPED | OUTPATIENT
Start: 2021-10-07 | End: 2022-07-08 | Stop reason: SDUPTHER

## 2021-10-10 NOTE — PROGRESS NOTES
Progress Note      Pt Name: Oscar Menjivar  YOB: 1940  MRN: [id number]    Date of evaluation: 10/11/2021  History Obtained From:  patient, spouse -Landy Mendiola, electronic medical record    CHIEF COMPLAINT:    Chief Complaint   Patient presents with    Follow-up     Thrombocytopenia ([address]     Current active problems  Thrombocytopenia  Macrocytosis  B12 deficiency  Gammopathy      HISTORY OF PRESENT ILLNESS:    Oscar Menjivar [facility]) is a 79 y.o.  male who was seen initially in the office on 6/24/2021 because of mild thrombocytopenia. He was also noted to have macrocytosis on his initial visit. He did have initial serology and is here to review the results. He denies any hospitalizations since his last visit. He denies shyam COVID-19. He has mild bruising on his arms but denies any progressive bruising, bleeding. He does have hypertension and continues on amlodipine, clonidine, Coreg with stable blood pressure. He apparently has prediabetes and is on Metformin. He has peripheral vascular disease with carotid endarterectomies and also has coronary artery disease without any new exacerbations. HEMATOLOGY HISTORY: Thrombocytopenia, B12 deficiency, gammopathy    Juan Pablo Yo was seen in initial hematology consultation on 6/24/2021 referred by Dr. Shawna Henao for evaluation of thrombocytopenia.          Juan Pablo Yo had a coronary stent placed on 5/24/2021 by Dr. Baldo Collazo. He is on Plavix 75 mg and aspirin 81 daily.     He reports that he has known about mild thrombocytopenia going on for some time. He does have a history of easy bruising.     He does not have any known history of liver dysfunction.     CMP 6/2/2021 revealed slight elevation of ALT of 43 otherwise LFTs WNL.    His initial CBC in the office on 6/24/2021 revealed a WBC 6.85 with normal percent differential.  Hgb 13.4 with .5, ,000. I discussed the fact that he does have mild thrombocytopenia.   He does have tablets by mouth 2 times daily (with meals) For diabetes, Disp: 180 tablet, Rfl: 3    clopidogrel (PLAVIX) 75 MG tablet, Take 75 mg by mouth daily, Disp: , Rfl:     carvedilol (COREG) 3.125 MG tablet, Take 3.125 mg by mouth 2 times daily (with meals), Disp: , Rfl:     cloNIDine (CATAPRES) 0.1 MG tablet, Take 1 tablet by mouth 2 times daily, Disp: 180 tablet, Rfl: 3    allopurinol (ZYLOPRIM) 100 MG tablet, TAKE 1 TABLET TWICE DAILY, Disp: 180 tablet, Rfl: 3    blood glucose test strips (CONTOUR NEXT TEST) strip, 1 each by In Vitro route daily As needed. , Disp: 100 each, Rfl: 3    aspirin 81 MG tablet, Take 81 mg by mouth daily, Disp: , Rfl:     Ascorbic Acid (VITAMIN C) 250 MG tablet, Take 500 mg by mouth daily , Disp: , Rfl:     vitamin E 1000 UNITS capsule, Take 400 Units by mouth daily , Disp: , Rfl:      No Known Allergies    Social History     Tobacco Use    Smoking status: Never Smoker    Smokeless tobacco: Never Used   Vaping Use    Vaping Use: Never used   Substance Use Topics    Alcohol use: No    Drug use: No       Family History   Problem Relation Age of Onset    No Known Problems Mother     No Known Problems Father        Subjective   REVIEW OF SYSTEMS:   Review of Systems   Constitutional: Negative for chills, diaphoresis, fatigue, fever and unexpected weight change. HENT: Positive for dental problem. Negative for mouth sores, nosebleeds, sore throat, trouble swallowing and voice change. Eyes: Positive for visual disturbance. Negative for photophobia, discharge and itching. Respiratory: Positive for shortness of breath (With exertion mild and unchanged) and wheezing (Intermittently). Negative for cough. Cardiovascular: Negative for chest pain, palpitations and leg swelling. Gastrointestinal: Negative for abdominal distention, abdominal pain, blood in stool, constipation, diarrhea, nausea and vomiting.    Endocrine: Negative for cold intolerance, heat intolerance, polydipsia and .0 (H) 10/11/2021     (L) 10/11/2021     Lab Results   Component Value Date    NEUTROABS 4.23 10/11/2021       VISIT DIAGNOSES  1. Thrombocytopenia ([address]    2. Macrocytosis    3. Gammopathy        ASSESSMENT/PLAN:    1. Mild thrombocytopenia - work-up continues  2. Monocytosis  -work-up continues  3. B12 deficiency  -initiating replacement   4. IgM kappa gammopathy - trending serology    Serology 6/24/2021  B12 - 260  MMA - 375 (0-378)  Folate - 18.5  Copper - 83  Zinc - 118 ()  Antiplatelet ab - Negative  Hepatitis A, B, C panel - Negative  HIV - Non reactive  NAS - Negative  SPEP - No M spike with immunofixation unclear  QI -IgG 1161, IgA 173, IgM 220 ()  Free serum light chains - kappa 54.6 with K/L ratio 2.08  CMP - crt 1.3 with GFR 53, LFTs WNL    I reviewed the above serology with Wilfred De Leon and his wife Yesenia Camargo today. He has borderline B12 level with a high normal MMA level. I am placing him on B12 1000 mcg p.o. daily. A prescription is sent to the pharmacy. I also discussed the abnormal findings of a slightly elevated IgM and K/L ratio. I discussed the potential implications that included potential need for bone marrow aspiration and biopsy. Due to his age we are going to approach this conservatively. Physical examination does not reveal any peripheral lymphadenopathy or obvious splenomegaly. He does not have any B symptoms whatsoever-he denies any night sweats, excessive fatigue, weight loss, pruritus. PLAN  B12 replacement monitor CBC, recheck QI, free serum light chains, SPEP, B2M, LDH      · Colon cancer screening. He missed his last colonoscopy that was due a few years back because he had a stent recently placed and was on anticoagulants. He denies any change in bowels, rectal bleeding. He no longer gets colonoscopies due to his age. · Prostate cancer screening. He no longer gets PSAs due to his age.     Immunization History   Administered Date(s) Administered    COVID-19, Moderna, PF, 100mcg/0.5mL 02/24/2021, 03/31/2021           Orders Placed This Encounter   Procedures    Electrophoresis Protein, Serum with Reflex to Immunofixation    [place]/Lambda Free Lt Chains, Serum Quant    Beta 2 Microglobulin, Serum    Lactate Dehydrogenase        Return in about 3 months (around 1/11/2022) for With [employer].      Mireille Castro PA-C  11:21 AM  10/11/2021 No significant past surgical history

## 2021-10-11 ENCOUNTER — OFFICE VISIT (OUTPATIENT)
Dept: HEMATOLOGY | Age: 81
End: 2021-10-11
Payer: MEDICARE

## 2021-10-11 ENCOUNTER — HOSPITAL ENCOUNTER (OUTPATIENT)
Dept: INFUSION THERAPY | Age: 81
Discharge: HOME | End: 2021-10-11
Payer: MEDICARE

## 2021-10-11 VITALS
DIASTOLIC BLOOD PRESSURE: 60 MMHG | WEIGHT: 247.1 LBS | SYSTOLIC BLOOD PRESSURE: 120 MMHG | HEIGHT: 71 IN | HEART RATE: 71 BPM | OXYGEN SATURATION: 97 % | BODY MASS INDEX: 34.59 KG/M2

## 2021-10-11 DIAGNOSIS — E53.8 B12 DEFICIENCY: ICD-10-CM

## 2021-10-11 DIAGNOSIS — D69.6 THROMBOCYTOPENIA (HCC): Primary | ICD-10-CM

## 2021-10-11 DIAGNOSIS — D69.6 THROMBOCYTOPENIA (HCC): ICD-10-CM

## 2021-10-11 DIAGNOSIS — D75.89 MACROCYTOSIS: ICD-10-CM

## 2021-10-11 DIAGNOSIS — D47.2 GAMMOPATHY: ICD-10-CM

## 2021-10-11 LAB
BASOPHILS ABSOLUTE: 0.03 K/UL (ref 0.01–0.08)
BASOPHILS RELATIVE PERCENT: 0.5 % (ref 0.1–1.2)
EOSINOPHILS ABSOLUTE: 0.24 K/UL (ref 0.04–0.54)
EOSINOPHILS RELATIVE PERCENT: 3.6 % (ref 0.7–7)
HCT VFR BLD CALC: 41 % (ref 40.1–51)
HEMOGLOBIN: 14 G/DL (ref 13.7–17.5)
LACTATE DEHYDROGENASE: 407 U/L (ref 313–618)
LYMPHOCYTES ABSOLUTE: 1.59 K/UL (ref 1.18–3.74)
LYMPHOCYTES RELATIVE PERCENT: 24.2 % (ref 19.3–53.1)
MCH RBC QN AUTO: 35.2 PG (ref 25.7–32.2)
MCHC RBC AUTO-ENTMCNC: 34.1 G/DL (ref 32.3–36.5)
MCV RBC AUTO: 103 FL (ref 79–92.2)
MONOCYTES ABSOLUTE: 0.49 K/UL (ref 0.24–0.82)
MONOCYTES RELATIVE PERCENT: 7.4 % (ref 4.7–12.5)
NEUTROPHILS ABSOLUTE: 4.23 K/UL (ref 1.56–6.13)
NEUTROPHILS RELATIVE PERCENT: 64.3 % (ref 34–71.1)
PDW BLD-RTO: 12.9 % (ref 11.6–14.4)
PLATELET # BLD: 108 K/UL (ref 163–337)
PMV BLD AUTO: 12.3 FL (ref 7.4–10.4)
RBC # BLD: 3.98 M/UL (ref 4.63–6.08)
WBC # BLD: 6.58 K/UL (ref 4.23–9.07)

## 2021-10-11 PROCEDURE — 99214 OFFICE O/P EST MOD 30 MIN: CPT | Performed by: PHYSICIAN ASSISTANT

## 2021-10-11 PROCEDURE — G8427 DOCREV CUR MEDS BY ELIG CLIN: HCPCS | Performed by: PHYSICIAN ASSISTANT

## 2021-10-11 PROCEDURE — 4040F PNEUMOC VAC/ADMIN/RCVD: CPT | Performed by: PHYSICIAN ASSISTANT

## 2021-10-11 PROCEDURE — G8483 FLU IMM NO ADMIN DOC REA: HCPCS | Performed by: PHYSICIAN ASSISTANT

## 2021-10-11 PROCEDURE — 36415 COLL VENOUS BLD VENIPUNCTURE: CPT

## 2021-10-11 PROCEDURE — 83615 LACTATE (LD) (LDH) ENZYME: CPT

## 2021-10-11 PROCEDURE — 1123F ACP DISCUSS/DSCN MKR DOCD: CPT | Performed by: PHYSICIAN ASSISTANT

## 2021-10-11 PROCEDURE — 85025 COMPLETE CBC W/AUTO DIFF WBC: CPT

## 2021-10-11 PROCEDURE — 1036F TOBACCO NON-USER: CPT | Performed by: PHYSICIAN ASSISTANT

## 2021-10-11 PROCEDURE — G8417 CALC BMI ABV UP PARAM F/U: HCPCS | Performed by: PHYSICIAN ASSISTANT

## 2021-10-11 RX ORDER — LANOLIN ALCOHOL/MO/W.PET/CERES
1000 CREAM (GRAM) TOPICAL DAILY
Qty: 90 TABLET | Refills: 1 | Status: SHIPPED | OUTPATIENT
Start: 2021-10-11

## 2021-10-11 ASSESSMENT — ENCOUNTER SYMPTOMS
NAUSEA: 0
VOMITING: 0
TROUBLE SWALLOWING: 0
SORE THROAT: 0
BACK PAIN: 0
ABDOMINAL DISTENTION: 0
WHEEZING: 1
EYE ITCHING: 0
COUGH: 0
ABDOMINAL PAIN: 0
PHOTOPHOBIA: 0
SHORTNESS OF BREATH: 1
DIARRHEA: 0
BLOOD IN STOOL: 0
VOICE CHANGE: 0
EYE DISCHARGE: 0
CONSTIPATION: 0
COLOR CHANGE: 0

## 2021-12-27 RX ORDER — INDAPAMIDE 1.25 MG/1
1.25 TABLET, FILM COATED ORAL DAILY
Qty: 90 TABLET | Refills: 3 | Status: SHIPPED | OUTPATIENT
Start: 2021-12-27 | End: 2022-01-05 | Stop reason: SDUPTHER

## 2022-01-04 RX ORDER — CLONIDINE HYDROCHLORIDE 0.1 MG/1
TABLET ORAL
Qty: 180 TABLET | Refills: 3 | Status: SHIPPED | OUTPATIENT
Start: 2022-01-04

## 2022-01-04 RX ORDER — ALLOPURINOL 100 MG/1
TABLET ORAL
Qty: 180 TABLET | Refills: 3 | Status: SHIPPED | OUTPATIENT
Start: 2022-01-04

## 2022-01-05 RX ORDER — INDAPAMIDE 1.25 MG/1
1.25 TABLET, FILM COATED ORAL 2 TIMES DAILY
Qty: 180 TABLET | Refills: 3 | Status: SHIPPED | OUTPATIENT
Start: 2022-01-05 | End: 2022-04-29 | Stop reason: SDUPTHER

## 2022-01-05 NOTE — PROGRESS NOTES
Progress Note      Pt Name: Ignacia Dyer  YOB: 1940  MRN: [id number]    Date of evaluation: 2/8/2022  History Obtained From:  patient, spouse -Francis Justice, electronic medical record    CHIEF COMPLAINT:    Chief Complaint   Patient presents with    Follow-up     Thrombocytopenia ([address]     Current active problems  Thrombocytopenia  Macrocytosis  B12 deficiency  Gammopathy    HISTORY OF PRESENT ILLNESS:    Ignacia Dyer [facility]) is a 79 y.o.  male who was seen initially in the office on 6/24/2021 because of mild thrombocytopenia. He was also noted to have macrocytosis on his initial visit. He was noted to have an elevated IgM level-just slightly as well as a slight elevated K/L ratio. He is being monitored with trending serology at present. He denies any night sweats, weight loss, pruritus, extreme fatigue. He still works on a daily basis. He was found to be B12 deficient and is taking oral B12 replacement. He takes 1000 mcg daily. He denies any hospitalization since last visit. He has not contracted COVID-19. He has taken both Moderna vaccinations as well as the booster. He has mild bruising on his arms but denies any progressive bruising, bleeding. His high blood pressure has been controlled well on amlodipine, clonidine, Coreg with stable blood pressure. He apparently has prediabetes and continues on metformin-A1c 5.6 on 9/27/2021. He has peripheral vascular disease with carotid endarterectomies and also has coronary artery disease without any new exacerbations. HEMATOLOGY HISTORY: Thrombocytopenia, B12 deficiency, gammopathy    [facility] was seen in initial hematology consultation on 6/24/2021 referred by Dr. Hiro Forbes for evaluation of thrombocytopenia.          [facility] had a coronary stent placed on 5/24/2021 by Dr. Rebekah Campbell. He is on Plavix 75 mg and aspirin 81 daily.     He reports that he has known about mild thrombocytopenia going on for some time.   He does have a history of easy bruising.     He does not have any known history of liver dysfunction.     CMP 6/2/2021 revealed slight elevation of ALT of 43 otherwise LFTs WNL.    His initial CBC in the office on 6/24/2021 revealed a WBC 6.85 with normal percent differential.  Hgb 13.4 with .5, ,000. I discussed the fact that he does have mild thrombocytopenia. He does have bruising issues. I also discussed fact that his MCV was slightly elevated. However, I explained to Erwin Velasquez and his wife Dianna Coyne that his platelets are not dangerously low by any means. Serology 6/24/2021  B12 - 260  MMA - 375 (0-378)  Folate - 18.5  Copper - 83  Zinc - 118 ()  Antiplatelet ab - Negative  Hepatitis A, B, C panel - Negative  HIV - Non reactive  NAS - Negative  SPEP - No M spike with immunofixation unclear  QI -IgG 1161, IgA 173, IgM 220 ()  Free serum light chains - kappa 54.6 with K/L ratio 2.08  CMP - crt 1.3 with GFR 53, LFTs WNL        Past Medical History:   Diagnosis Date    Acid reflux     GERD (gastroesophageal reflux disease)     Hx of blood clots     left leg    Hypertension         Past Surgical History:   Procedure Laterality Date    CARDIOVASCULAR STRESS TEST  07/2015    CAROTID ENDARTERECTOMY Left 6/28/2016    CAROTID ENDARTERECTOMY WITH GORETEX ACUSEAL PATCH ANGIOPLASTY performed by Oscar Henry MD at [address]      (L) blood clot    VASCULAR SURGERY  7/14/2015 [facility]    Eversion right carotid endarterectomy            Current Outpatient Medications:     indapamide (LOZOL) 1.25 MG tablet, Take 1 tablet by mouth 2 times daily, Disp: 180 tablet, Rfl: 3    cloNIDine (CATAPRES) 0.1 MG tablet, TAKE 1 TABLET TWICE DAILY, Disp: 180 tablet, Rfl: 3    metFORMIN (GLUCOPHAGE) 500 MG tablet, TAKE 2 TABLETS BY MOUTH 2 TIMES DAILY (WITH MEALS) FOR DIABETES.  STOP JANUVIA, Disp: 360 tablet, Rfl: 3    allopurinol (ZYLOPRIM) 100 MG tablet, TAKE 1 TABLET TWICE DAILY, Disp: 180 tablet, Rfl: 3    vitamin B-12 (CYANOCOBALAMIN) 1000 MCG tablet, Take 1 tablet by mouth daily, Disp: 90 tablet, Rfl: 1    ranolazine (RANEXA) 500 MG extended release tablet, Take 1 tablet by mouth 2 times daily, Disp: 14 tablet, Rfl: 0    zoster recombinant adjuvanted vaccine (SHINGRIX) 50 MCG/0.5ML SUSR injection, Inject 0.5 mLs into the muscle See Admin Instructions 1 dose now and repeat in 2-6 months, Disp: 0.5 mL, Rfl: 0    amLODIPine (NORVASC) 10 MG tablet, TAKE 1 TABLET EVERY NIGHT, Disp: 90 tablet, Rfl: 3    rosuvastatin (CRESTOR) 5 MG tablet, 1 tablet by mouth at bedtime for high cholesterol, Disp: 90 tablet, Rfl: 3    clopidogrel (PLAVIX) 75 MG tablet, Take 75 mg by mouth daily, Disp: , Rfl:     carvedilol (COREG) 3.125 MG tablet, Take 3.125 mg by mouth 2 times daily (with meals), Disp: , Rfl:     blood glucose test strips (CONTOUR NEXT TEST) strip, 1 each by In Vitro route daily As needed. , Disp: 100 each, Rfl: 3    aspirin 81 MG tablet, Take 81 mg by mouth daily, Disp: , Rfl:     Ascorbic Acid (VITAMIN C) 250 MG tablet, Take 500 mg by mouth daily , Disp: , Rfl:     vitamin E 1000 UNITS capsule, Take 400 Units by mouth daily , Disp: , Rfl:      No Known Allergies    Social History     Tobacco Use    Smoking status: Never Smoker    Smokeless tobacco: Never Used   Vaping Use    Vaping Use: Never used   Substance Use Topics    Alcohol use: No    Drug use: No       Family History   Problem Relation Age of Onset    No Known Problems Mother     No Known Problems Father        Subjective   REVIEW OF SYSTEMS:   Review of Systems   Constitutional: Negative for chills, diaphoresis, fatigue, fever and unexpected weight change. HENT: Positive for dental problem. Negative for mouth sores, nosebleeds, sore throat, trouble swallowing and voice change. Eyes: Positive for visual disturbance. Negative for photophobia, discharge and itching.    Respiratory: Negative for cough, shortness of breath and wheezing. Cardiovascular: Negative for chest pain, palpitations and leg swelling. Gastrointestinal: Negative for abdominal distention, abdominal pain, blood in stool, constipation, diarrhea, nausea and vomiting. Endocrine: Negative for cold intolerance, heat intolerance, polydipsia and polyuria. Genitourinary: Negative for difficulty urinating, dysuria, hematuria and urgency. Musculoskeletal: Positive for arthralgias. Negative for back pain, joint swelling and myalgias. Skin: Negative for color change and rash. Neurological: Negative for dizziness, tremors, seizures, syncope and light-headedness. Hematological: Negative for adenopathy. Bruises/bleeds easily. Psychiatric/Behavioral: Negative for behavioral problems and suicidal ideas. The patient is not nervous/anxious. All other systems reviewed and are negative. Objective   /76   Pulse 77   Ht 5' 11\" (1.803 m)   Wt 253 lb 8 oz (115 kg)   SpO2 95%   BMI 35.36 kg/m²     PHYSICAL EXAM:  Physical Exam  Vitals reviewed. Constitutional:       General: He is not in acute distress. Appearance: He is well-developed. HENT:      Head: Normocephalic and atraumatic. Nose: Nose normal.   Eyes:      General: No scleral icterus. Conjunctiva/sclera: Conjunctivae normal.   Neck:      Vascular: No JVD. Trachea: No tracheal deviation. Cardiovascular:      Rate and Rhythm: Normal rate and regular rhythm. Heart sounds: Normal heart sounds. No murmur heard. Pulmonary:      Effort: Pulmonary effort is normal. No respiratory distress. Breath sounds: Normal breath sounds. No wheezing. Abdominal:      General: Bowel sounds are normal. There is no distension. Palpations: Abdomen is soft. There is no mass. Tenderness: There is no abdominal tenderness. Musculoskeletal:         General: No tenderness or deformity. Normal range of motion.    Skin:     Findings: Ecchymosis (Both arms) present. No erythema or rash. Neurological:      Mental Status: He is alert and oriented to person, place, and time. Psychiatric:         Thought Content: Thought content normal.          CBC reviewed by me  Lab Results   Component Value Date    WBC 7.30 02/08/2022    HGB 14.7 02/08/2022    HCT 43.0 02/08/2022    MCV 97.5 (H) 02/08/2022    PLT 93 (L) 02/08/2022     Lab Results   Component Value Date    NEUTROABS 4.23 10/11/2021       VISIT DIAGNOSES  1. Thrombocytopenia ([address]    2. Macrocytosis    3. B12 deficiency    4. Gammopathy        ASSESSMENT/PLAN:    1. Mild thrombocytopenia -monitoring  2. B12 deficiency  - replacement continues      Serology 6/24/2021  B12 - 260  MMA - 375 (0-378)        CBC today reveals a WBC 7.3 with 68.1% neutrophil, 24.1% lymphocyte, some 1 extra monocyte. Hgb is 14.7 MCV 97.5, PLT is 93,000. Platelet count has decreased further. I discussed his CBC with him. Due to age comorbid medical issues we are monitoring him conservatively. We may need to get imaging of the liver/spleen. PLAN  Continue taking B12 1000 mcg p.o. daily. Recheck B12 level      4. IgM kappa gammopathy - trending serology    Serology 6/24/2021  SPEP - No M spike with immunofixation unclear  QI -IgG 1161, IgA 173, IgM 220 ()  Free serum light chains - kappa 54.6 with K/L ratio 2.08  CMP - crt 1.3 with GFR 53, LFTs WNL    Serology 10/11/2021  SPEP - no M spike with immunofixation unclear  QI - IgG 1080, IgA 160, IgM 164  Kappa light chains - Kappa 55.6, Lambda 24.9, K/L ratio 2.23  B2M - 2.6  LDH - 407 - WNL    He does not have any B symptoms. He does not have any palpable peripheral lymphadenopathy or splenomegaly. I discussed potential need for bone marrow aspiration biopsy however at present we are monitoring him conservatively given his age and comorbid medical issues. I discussed bone survey with him and he agrees to have the x-rays today.       PLAN  Trend QI, free serum light chains, SPEP, B2M, CMP  Bone survey today        · Colon cancer screening. He missed his last colonoscopy that was due a few years back because he had a stent recently placed and was on anticoagulants. He denies any change in bowels, rectal bleeding. He no longer gets colonoscopies due to his age. · Prostate cancer screening. He no longer gets PSAs due to his age. Immunization History   Administered Date(s) Administered    COVID-19, Moderna, Booster, PF, 50mcg/0.25ml 12/21/2021    COVID-19, Moderna, Primary or Immunocompromised, PF, 100mcg/0.5mL 02/24/2021, 03/31/2021           Orders Placed This Encounter   Procedures    XR BONE SURVEY COMPLETE    Comprehensive Metabolic Panel    Electrophoresis Protein, Serum with Reflex to Immunofixation    Strum/Lambda Free Lt Chains, Serum Quant    Beta 2 Microglobulin, Serum    Vitamin B12        Return in about 4 months (around 6/8/2022) for With Sanam Alicea.      Chelsey Wyman PA-C  8:49 AM  2/8/2022

## 2022-02-07 DIAGNOSIS — D69.6 THROMBOCYTOPENIA (HCC): Primary | ICD-10-CM

## 2022-02-08 ENCOUNTER — OFFICE VISIT (OUTPATIENT)
Dept: HEMATOLOGY | Age: 82
End: 2022-02-08
Payer: MEDICARE

## 2022-02-08 ENCOUNTER — HOSPITAL ENCOUNTER (OUTPATIENT)
Dept: GENERAL RADIOLOGY | Age: 82
Discharge: HOME | End: 2022-02-08
Payer: MEDICARE

## 2022-02-08 ENCOUNTER — HOSPITAL ENCOUNTER (OUTPATIENT)
Dept: INFUSION THERAPY | Age: 82
Discharge: HOME | End: 2022-02-08
Payer: MEDICARE

## 2022-02-08 VITALS
WEIGHT: 253.5 LBS | HEIGHT: 71 IN | HEART RATE: 77 BPM | BODY MASS INDEX: 35.49 KG/M2 | DIASTOLIC BLOOD PRESSURE: 76 MMHG | OXYGEN SATURATION: 95 % | SYSTOLIC BLOOD PRESSURE: 138 MMHG

## 2022-02-08 DIAGNOSIS — D47.2 GAMMOPATHY: ICD-10-CM

## 2022-02-08 DIAGNOSIS — E53.8 B12 DEFICIENCY: ICD-10-CM

## 2022-02-08 DIAGNOSIS — D69.6 THROMBOCYTOPENIA (HCC): ICD-10-CM

## 2022-02-08 DIAGNOSIS — D69.6 THROMBOCYTOPENIA (HCC): Primary | ICD-10-CM

## 2022-02-08 DIAGNOSIS — D75.89 MACROCYTOSIS: ICD-10-CM

## 2022-02-08 DIAGNOSIS — D47.2 MONOCLONAL PARAPROTEINEMIA: ICD-10-CM

## 2022-02-08 LAB
ALBUMIN SERPL-MCNC: 4.7 G/DL (ref 3.5–5.2)
ALP BLD-CCNC: 61 U/L (ref 40–130)
ALT SERPL-CCNC: 43 U/L (ref 21–72)
ANION GAP SERPL CALCULATED.3IONS-SCNC: 11 MMOL/L (ref 7–19)
AST SERPL-CCNC: 40 U/L (ref 17–59)
BILIRUB SERPL-MCNC: 0.6 MG/DL (ref 0.2–1.3)
BUN BLDV-MCNC: 22 MG/DL (ref 9–20)
CALCIUM SERPL-MCNC: 9.2 MG/DL (ref 8.4–10.2)
CHLORIDE BLD-SCNC: 99 MMOL/L (ref 98–111)
CO2: 29 MMOL/L (ref 22–29)
CREAT SERPL-MCNC: 1.4 MG/DL (ref 0.6–1.2)
GFR NON-AFRICAN AMERICAN: 48
GLOBULIN: 2.8 G/DL
GLUCOSE BLD-MCNC: 113 MG/DL (ref 74–106)
HCT VFR BLD CALC: 43 % (ref 40.1–51)
HEMOGLOBIN: 14.7 G/DL (ref 13.7–17.5)
MCH RBC QN AUTO: 33.3 PG (ref 25.7–32.2)
MCHC RBC AUTO-ENTMCNC: 34.2 G/DL (ref 32.3–36.5)
MCV RBC AUTO: 97.5 FL (ref 79–92.2)
PDW BLD-RTO: 12.8 % (ref 11.6–14.4)
PLATELET # BLD: 93 K/UL (ref 163–337)
PMV BLD AUTO: 11.4 FL (ref 7.4–10.4)
POTASSIUM SERPL-SCNC: 4.4 MMOL/L (ref 3.5–5.1)
RBC # BLD: 4.41 M/UL (ref 4.63–6.08)
SODIUM BLD-SCNC: 139 MMOL/L (ref 137–145)
TOTAL PROTEIN: 7.4 G/DL (ref 6.3–8.2)
VITAMIN B-12: 592 PG/ML (ref 211–946)
WBC # BLD: 7.3 K/UL (ref 4.23–9.07)

## 2022-02-08 PROCEDURE — 1123F ACP DISCUSS/DSCN MKR DOCD: CPT | Performed by: PHYSICIAN ASSISTANT

## 2022-02-08 PROCEDURE — 4040F PNEUMOC VAC/ADMIN/RCVD: CPT | Performed by: PHYSICIAN ASSISTANT

## 2022-02-08 PROCEDURE — 77075 RADEX OSSEOUS SURVEY COMPL: CPT

## 2022-02-08 PROCEDURE — 99211 OFF/OP EST MAY X REQ PHY/QHP: CPT

## 2022-02-08 PROCEDURE — 85027 COMPLETE CBC AUTOMATED: CPT

## 2022-02-08 PROCEDURE — 80053 COMPREHEN METABOLIC PANEL: CPT

## 2022-02-08 PROCEDURE — G8417 CALC BMI ABV UP PARAM F/U: HCPCS | Performed by: PHYSICIAN ASSISTANT

## 2022-02-08 PROCEDURE — G8427 DOCREV CUR MEDS BY ELIG CLIN: HCPCS | Performed by: PHYSICIAN ASSISTANT

## 2022-02-08 PROCEDURE — 99214 OFFICE O/P EST MOD 30 MIN: CPT | Performed by: PHYSICIAN ASSISTANT

## 2022-02-08 PROCEDURE — 1036F TOBACCO NON-USER: CPT | Performed by: PHYSICIAN ASSISTANT

## 2022-02-08 PROCEDURE — G8483 FLU IMM NO ADMIN DOC REA: HCPCS | Performed by: PHYSICIAN ASSISTANT

## 2022-02-08 ASSESSMENT — ENCOUNTER SYMPTOMS
BACK PAIN: 0
ABDOMINAL DISTENTION: 0
EYE DISCHARGE: 0
COLOR CHANGE: 0
DIARRHEA: 0
PHOTOPHOBIA: 0
NAUSEA: 0
VOICE CHANGE: 0
TROUBLE SWALLOWING: 0
SORE THROAT: 0
EYE ITCHING: 0
COUGH: 0
VOMITING: 0
SHORTNESS OF BREATH: 0
CONSTIPATION: 0
ABDOMINAL PAIN: 0
BLOOD IN STOOL: 0
WHEEZING: 0

## 2022-02-11 LAB — BETA-2 MICROGLOBULIN: 3 MG/L (ref 1.1–2.4)

## 2022-02-12 LAB
+IMM: ABNORMAL
ALBUMIN SERPL-MCNC: 4.17 G/DL (ref 3.75–5.01)
ALPHA-1-GLOBULIN: 0.26 G/DL (ref 0.19–0.46)
ALPHA-2-GLOBULIN: 0.82 G/DL (ref 0.48–1.05)
BETA GLOBULIN: 0.71 G/DL (ref 0.48–1.1)
GAMMA GLOBULIN: 1.14 G/DL (ref 0.62–1.51)
IGA: 155 MG/DL (ref 68–408)
IGG: 1161 MG/DL (ref 768–1632)
IGM: 161 MG/DL (ref 35–263)
KAPPA FREE LIGHT CHAINS QNT: 48.7 MG/L (ref 3.3–19.4)
KAPPA/LAMBDA FREE LIGHT CHAIN RATIO: 2.07 (ref 0.26–1.65)
LAMBDA FREE LIGHT CHAINS QNT: 23.49 MG/L (ref 5.71–26.3)
SPE/IFE INTERPRETATION: ABNORMAL
TOTAL PROTEIN: 7.1 G/DL (ref 6.3–8.2)

## 2022-02-24 DIAGNOSIS — E11.9 TYPE 2 DIABETES MELLITUS WITHOUT COMPLICATION, WITHOUT LONG-TERM CURRENT USE OF INSULIN (HCC): Primary | ICD-10-CM

## 2022-02-24 DIAGNOSIS — E78.5 HYPERLIPIDEMIA, UNSPECIFIED HYPERLIPIDEMIA TYPE: ICD-10-CM

## 2022-02-24 DIAGNOSIS — I10 ESSENTIAL HYPERTENSION: ICD-10-CM

## 2022-02-25 DIAGNOSIS — E11.9 TYPE 2 DIABETES MELLITUS WITHOUT COMPLICATION, WITHOUT LONG-TERM CURRENT USE OF INSULIN (HCC): ICD-10-CM

## 2022-02-25 DIAGNOSIS — I10 ESSENTIAL HYPERTENSION: ICD-10-CM

## 2022-02-25 DIAGNOSIS — D51.9 ANEMIA DUE TO VITAMIN B12 DEFICIENCY, UNSPECIFIED B12 DEFICIENCY TYPE: ICD-10-CM

## 2022-02-25 DIAGNOSIS — E78.5 HYPERLIPIDEMIA, UNSPECIFIED HYPERLIPIDEMIA TYPE: ICD-10-CM

## 2022-02-25 LAB
ALBUMIN SERPL-MCNC: 4.6 G/DL (ref 3.5–5.2)
ALP BLD-CCNC: 80 U/L (ref 40–130)
ALT SERPL-CCNC: 39 U/L (ref 5–41)
ANION GAP SERPL CALCULATED.3IONS-SCNC: 13 MMOL/L (ref 7–19)
AST SERPL-CCNC: 25 U/L (ref 5–40)
BILIRUB SERPL-MCNC: 0.4 MG/DL (ref 0.2–1.2)
BUN BLDV-MCNC: 19 MG/DL (ref 8–23)
CALCIUM SERPL-MCNC: 9.7 MG/DL (ref 8.8–10.2)
CHLORIDE BLD-SCNC: 99 MMOL/L (ref 98–111)
CHOLESTEROL, TOTAL: 92 MG/DL (ref 160–199)
CO2: 25 MMOL/L (ref 22–29)
CREAT SERPL-MCNC: 1.2 MG/DL (ref 0.5–1.2)
GFR AFRICAN AMERICAN: >59
GFR NON-AFRICAN AMERICAN: 58
GLUCOSE BLD-MCNC: 126 MG/DL (ref 74–109)
HBA1C MFR BLD: 5.8 % (ref 4–6)
HCT VFR BLD CALC: 44.3 % (ref 42–52)
HDLC SERPL-MCNC: 43 MG/DL (ref 55–121)
HEMOGLOBIN: 14.9 G/DL (ref 14–18)
LDL CHOLESTEROL CALCULATED: 22 MG/DL
MCH RBC QN AUTO: 32.9 PG (ref 27–31)
MCHC RBC AUTO-ENTMCNC: 33.6 G/DL (ref 33–37)
MCV RBC AUTO: 97.8 FL (ref 80–94)
PDW BLD-RTO: 12.9 % (ref 11.5–14.5)
PLATELET # BLD: 102 K/UL (ref 130–400)
PMV BLD AUTO: 13 FL (ref 9.4–12.4)
POTASSIUM SERPL-SCNC: 4.7 MMOL/L (ref 3.5–5)
RBC # BLD: 4.53 M/UL (ref 4.7–6.1)
SODIUM BLD-SCNC: 137 MMOL/L (ref 136–145)
TOTAL PROTEIN: 7.5 G/DL (ref 6.6–8.7)
TRIGL SERPL-MCNC: 134 MG/DL (ref 0–149)
VITAMIN B-12: 584 PG/ML (ref 211–946)
WBC # BLD: 6.5 K/UL (ref 4.8–10.8)

## 2022-03-02 ENCOUNTER — OFFICE VISIT (OUTPATIENT)
Dept: PRIMARY CARE CLINIC | Age: 82
End: 2022-03-02
Payer: MEDICARE

## 2022-03-02 VITALS
OXYGEN SATURATION: 95 % | DIASTOLIC BLOOD PRESSURE: 64 MMHG | SYSTOLIC BLOOD PRESSURE: 130 MMHG | BODY MASS INDEX: 34.44 KG/M2 | TEMPERATURE: 97.4 F | RESPIRATION RATE: 16 BRPM | HEIGHT: 71 IN | HEART RATE: 76 BPM | WEIGHT: 246 LBS

## 2022-03-02 DIAGNOSIS — Z00.00 MEDICARE ANNUAL WELLNESS VISIT, SUBSEQUENT: Primary | ICD-10-CM

## 2022-03-02 DIAGNOSIS — M79.89 LEG SWELLING: ICD-10-CM

## 2022-03-02 DIAGNOSIS — D51.9 ANEMIA DUE TO VITAMIN B12 DEFICIENCY, UNSPECIFIED B12 DEFICIENCY TYPE: ICD-10-CM

## 2022-03-02 DIAGNOSIS — R09.89: ICD-10-CM

## 2022-03-02 DIAGNOSIS — I10 ESSENTIAL HYPERTENSION: ICD-10-CM

## 2022-03-02 DIAGNOSIS — D69.6 THROMBOCYTOPENIA (HCC): ICD-10-CM

## 2022-03-02 DIAGNOSIS — E78.5 HYPERLIPIDEMIA, UNSPECIFIED HYPERLIPIDEMIA TYPE: ICD-10-CM

## 2022-03-02 PROCEDURE — G8483 FLU IMM NO ADMIN DOC REA: HCPCS | Performed by: FAMILY MEDICINE

## 2022-03-02 PROCEDURE — G0439 PPPS, SUBSEQ VISIT: HCPCS | Performed by: FAMILY MEDICINE

## 2022-03-02 PROCEDURE — G8417 CALC BMI ABV UP PARAM F/U: HCPCS | Performed by: FAMILY MEDICINE

## 2022-03-02 PROCEDURE — 99214 OFFICE O/P EST MOD 30 MIN: CPT | Performed by: FAMILY MEDICINE

## 2022-03-02 PROCEDURE — 4040F PNEUMOC VAC/ADMIN/RCVD: CPT | Performed by: FAMILY MEDICINE

## 2022-03-02 PROCEDURE — G8427 DOCREV CUR MEDS BY ELIG CLIN: HCPCS | Performed by: FAMILY MEDICINE

## 2022-03-02 PROCEDURE — 1123F ACP DISCUSS/DSCN MKR DOCD: CPT | Performed by: FAMILY MEDICINE

## 2022-03-02 PROCEDURE — 1036F TOBACCO NON-USER: CPT | Performed by: FAMILY MEDICINE

## 2022-03-02 RX ORDER — TOBRAMYCIN 3 MG/ML
SOLUTION/ DROPS OPHTHALMIC
COMMUNITY
Start: 2021-12-02 | End: 2022-03-02 | Stop reason: ALTCHOICE

## 2022-03-02 RX ORDER — FUROSEMIDE 20 MG/1
TABLET ORAL
COMMUNITY
Start: 2021-11-29

## 2022-03-02 RX ORDER — PREDNISOLONE ACETATE 10 MG/ML
SUSPENSION/ DROPS OPHTHALMIC
COMMUNITY
Start: 2022-01-18 | End: 2022-03-02 | Stop reason: ALTCHOICE

## 2022-03-02 ASSESSMENT — PATIENT HEALTH QUESTIONNAIRE - PHQ9
SUM OF ALL RESPONSES TO PHQ QUESTIONS 1-9: 0
2. FEELING DOWN, DEPRESSED OR HOPELESS: 0
SUM OF ALL RESPONSES TO PHQ QUESTIONS 1-9: 0
SUM OF ALL RESPONSES TO PHQ9 QUESTIONS 1 & 2: 0
SUM OF ALL RESPONSES TO PHQ QUESTIONS 1-9: 0
SUM OF ALL RESPONSES TO PHQ QUESTIONS 1-9: 0
1. LITTLE INTEREST OR PLEASURE IN DOING THINGS: 0

## 2022-03-02 ASSESSMENT — LIFESTYLE VARIABLES: HOW OFTEN DO YOU HAVE A DRINK CONTAINING ALCOHOL: NEVER

## 2022-03-02 NOTE — PATIENT INSTRUCTIONS
Personalized Preventive Plan for Halle Boudreaux - 3/2/2022  Medicare offers a range of preventive health benefits. Some of the tests and screenings are paid in full while other may be subject to a deductible, co-insurance, and/or copay. Some of these benefits include a comprehensive review of your medical history including lifestyle, illnesses that may run in your family, and various assessments and screenings as appropriate. After reviewing your medical record and screening and assessments performed today your provider may have ordered immunizations, labs, imaging, and/or referrals for you. A list of these orders (if applicable) as well as your Preventive Care list are included within your After Visit Summary for your review. Other Preventive Recommendations:    · A preventive eye exam performed by an eye specialist is recommended every 1-2 years to screen for glaucoma; cataracts, macular degeneration, and other eye disorders. · A preventive dental visit is recommended every 6 months. · Try to get at least 150 minutes of exercise per week or 10,000 steps per day on a pedometer . · Order or download the FREE \"Exercise & Physical Activity: Your Everyday Guide\" from [employer]. Call 8-456.249.7105 or search [employer] online. · You need 9048-2380 mg of calcium and 3367-7860 IU of vitamin D per day. It is possible to meet your calcium requirement with diet alone, but a vitamin D supplement is usually necessary to meet this goal.  · When exposed to the sun, use a sunscreen that protects against both UVA and UVB radiation with an SPF of 30 or greater. Reapply every 2 to 3 hours or after sweating, drying off with a towel, or swimming. · Always wear a seat belt when traveling in a car. Always wear a helmet when riding a bicycle or motorcycle. Heart-Healthy Diet   Sodium, Fat, and Cholesterol Controlled Diet       What Is a Heart Healthy Diet?    A heart-healthy diet is one that limits sodium , certain types of fat , and cholesterol . This type of diet is recommended for:   People with any form of cardiovascular disease (eg, coronary heart disease , peripheral vascular disease , previous heart attack , previous stroke )   People with risk factors for cardiovascular disease, such as high blood pressure , high cholesterol , or diabetes   Anyone who wants to lower their risk of developing cardiovascular disease   Sodium    Sodium is a mineral found in many foods. In general, most people consume much more sodium than they need. Diets high in sodium can increase blood pressure and lead to edema (water retention). On a heart-healthy diet, you should consume no more than 2,300 mg (milligrams) of sodium per dayabout the amount in one teaspoon of table salt. The foods highest in sodium include table salt (about 50% sodium), processed foods, convenience foods, and preserved foods. Cholesterol    Cholesterol is a fat-like, waxy substance in your blood. Our bodies make some cholesterol. It is also found in animal products, with the highest amounts in fatty meat, egg yolks, whole milk, cheese, shellfish, and organ meats. On a heart-healthy diet, you should limit your cholesterol intake to less than 200 mg per day. It is normal and important to have some cholesterol in your bloodstream. But too much cholesterol can cause plaque to build up within your arteries, which can eventually lead to a heart attack or stroke. The two types of cholesterol that are most commonly referred to are:   Low-density lipoprotein (LDL) cholesterol  Also known as bad cholesterol, this is the cholesterol that tends to build up along your arteries. Bad cholesterol levels are increased by eating fats that are saturated or hydrogenated. Optimal level of this cholesterol is less than 100. Over 130 starts to get risky for heart disease.    High-density lipoprotein (HDL) cholesterol  Also known as good cholesterol, this type of cholesterol actually carries cholesterol away from your arteries and may, therefore, help lower your risk of having a heart attack. You want this level to be high (ideally greater than 60). It is a risk to have a level less than 40. You can raise this good cholesterol by eating olive oil, canola oil, avocados, or nuts. Exercise raises this level, too. Fat    Fat is calorie dense and packs a lot of calories into a small amount of food. Even though fats should be limited due to their high calorie content, not all fats are bad. In fact, some fats are quite healthful. Fat can be broken down into four main types. The good-for-you fats are:   Monounsaturated fat  found in oils such as olive and canola, avocados, and nuts and natural nut butters; can decrease cholesterol levels, while keeping levels of HDL cholesterol high   Polyunsaturated fat  found in oils such as safflower, sunflower, soybean, corn, and sesame; can decrease total cholesterol and LDL cholesterol   Omega-3 fatty acids  particularly those found in fatty fish (such as salmon, trout, tuna, mackerel, herring, and sardines); can decrease risk of arrhythmias, decrease triglyceride levels, and slightly lower blood pressure   The fats that you want to limit are:   Saturated fat  found in animal products, many fast foods, and a few vegetables; increases total blood cholesterol, including LDL levels   Animal fats that are saturated include: butter, lard, whole-milk dairy products, meat fat, and poultry skin   Vegetable fats that are saturated include: hydrogenated shortening, palm oil, coconut oil, cocoa butter   Hydrogenated or trans fat  found in margarine and vegetable shortening, most shelf stable snack foods, and fried foods; increases LDL and decreases HDL     It is generally recommended that you limit your total fat for the day to less than 30% of your total calories.  If you follow an 1800-calorie heart healthy diet, for example, this would mean 60 grams of fat or less per day. Saturated fat and trans fat in your diet raises your blood cholesterol the most, much more than dietary cholesterol does. For this reason, on a heart-healthy diet, less than 7% of your calories should come from saturated fat and ideally 0% from trans fat. On an 1800-calorie diet, this translates into less than 14 grams of saturated fat per day, leaving 46 grams of fat to come from mono- and polyunsaturated fats.    Food Choices on a Heart Healthy Diet   Food Category   Foods Recommended   Foods to Avoid   Grains   Breads and rolls without salted tops Most dry and cooked cereals Unsalted crackers and breadsticks Low-sodium or homemade breadcrumbs or stuffing All rice and pastas   Breads, rolls, and crackers with salted tops High-fat baked goods (eg, muffins, donuts, pastries) Quick breads, self-rising flour, and biscuit mixes Regular bread crumbs Instant hot cereals Commercially prepared rice, pasta, or stuffing mixes   Vegetables   Most fresh, frozen, and low-sodium canned vegetables Low-sodium and salt-free vegetable juices Canned vegetables if unsalted or rinsed   Regular canned vegetables and juices, including sauerkraut and pickled vegetables Frozen vegetables with sauces Commercially prepared potato and vegetable mixes   Fruits   Most fresh, frozen, and canned fruits All fruit juices   Fruits processed with salt or sodium   Milk   Nonfat or low-fat (1%) milk Nonfat or low-fat yogurt Cottage cheese, low-fat ricotta, cheeses labeled as low-fat and low-sodium   Whole milk Reduced-fat (2%) milk Malted and chocolate milk Full fat yogurt Most cheeses (unless low-fat and low salt) Buttermilk (no more than 1 cup per week)   Meats and Beans   Lean cuts of fresh or frozen beef, veal, lamb, or pork (look for the word loin) Fresh or frozen poultry without the skin Fresh or frozen fish and some shellfish Egg whites and egg substitutes (Limit whole eggs to three per week) Tofu Nuts or seeds (unsalted, dry-roasted), low-sodium peanut butter Dried peas, beans, and lentils   Any smoked, cured, salted, or canned meat, fish, or poultry (including bee, chipped beef, cold cuts, hot dogs, sausages, sardines, and anchovies) Poultry skins Breaded and/or fried fish or meats Canned peas, beans, and lentils Salted nuts   Fats and Oils   Olive oil and canola oil Low-sodium, low-fat salad dressings and mayonnaise   Butter, margarine, coconut and palm oils, bee fat   Snacks, Sweets, and Condiments   Low-sodium or unsalted versions of broths, soups, soy sauce, and condiments Pepper, herbs, and spices; vinegar, lemon, or lime juice Low-fat frozen desserts (yogurt, sherbet, fruit bars) Sugar, cocoa powder, honey, syrup, jam, and preserves Low-fat, trans-fat free cookies, cakes, and pies Julito and animal crackers, fig bars, abigail snaps   High-fat desserts Broth, soups, gravies, and sauces, made from instant mixes or other high-sodium ingredients Salted snack foods Canned olives Meat tenderizers, seasoning salt, and most flavored vinegars   Beverages   Low-sodium carbonated beverages Tea and coffee in moderation Soy milk   Commercially softened water   Suggestions   Make whole grains, fruits, and vegetables the base of your diet. Choose heart-healthy fats such as canola, olive, and flaxseed oil, and foods high in heart-healthy fats, such as nuts, seeds, soybeans, tofu, and fish. Eat fish at least twice per week; the fish highest in omega-3 fatty acids and lowest in mercury include salmon, herring, mackerel, sardines, and canned chunk light tuna. If you eat fish less than twice per week or have high triglycerides, talk to your doctor about taking fish oil supplements. Read food labels.    For products low in fat and cholesterol, look for fat free, low-fat, cholesterol free, saturated fat free, and trans fat freeAlso scan the Nutrition Facts Label, which lists saturated fat, trans fat, and cholesterol amounts. For products low in sodium, look for sodium free, very low sodium, low sodium, no added salt, and unsalted   Skip the salt when cooking or at the table; if food needs more flavor, get creative and try out different herbs and spices. Garlic and onion also add substantial flavor to foods. Trim any visible fat off meat and poultry before cooking, and drain the fat off after quiroga. Use cooking methods that require little or no added fat, such as grilling, boiling, baking, poaching, broiling, roasting, steaming, stir-frying, and sauting. Avoid fast food and convenience food. They tend to be high in saturated and trans fat and have a lot of added salt. Talk to a registered dietitian for individualized diet advice. Last Reviewed: March 2011 Mary Wolfe MS, MPH, RD   Updated: 3/29/2011   ·   Patient Education        Leg and Ankle Edema: Care Instructions  Your Care Instructions  Swelling in the legs, ankles, and feet is called edema. It is common after you sit or stand for a while. Long plane flights or car rides often cause swelling in the legs and feet. You may also have swelling if you have to stand for long periods of time at your job. Problems with the veins in the legs (varicose veins) and changes in hormones can also cause swelling. Sometimes the swelling in the ankles and feet is caused by a more serious problem, such as heart failure, infection, blood clots, or liver or kidney disease. Follow-up care is a key part of your treatment and safety. Be sure to make and go to all appointments, and call your doctor if you are having problems. It's also a good idea to know your test results and keep a list of the medicines you take. How can you care for yourself at home? If your doctor gave you medicine, take it as prescribed. Call your doctor if you think you are having a problem with your medicine. Whenever you are resting, raise your legs up.  Try to keep the swollen area higher than the level of your heart. Take breaks from standing or sitting in one position. Walk around to increase the blood flow in your lower legs. Move your feet and ankles often while you stand, or tighten and relax your leg muscles. Wear support stockings. Put them on in the morning, before swelling gets worse. Eat a balanced diet. Lose weight if you need to. Limit the amount of salt (sodium) in your diet. Salt holds fluid in the body and may increase swelling. When should you call for help? Call 911 anytime you think you may need emergency care. For example, call if:    You have symptoms of a blood clot in your lung (called a pulmonary embolism). These may include:  Sudden chest pain. Trouble breathing. Coughing up blood. Call your doctor now or seek immediate medical care if:    You have signs of a blood clot, such as:  Pain in your calf, back of the knee, thigh, or groin. Redness and swelling in your leg or groin.     You have symptoms of infection, such as: Increased pain, swelling, warmth, or redness. Red streaks or pus. A fever. Watch closely for changes in your health, and be sure to contact your doctor if:    Your swelling is getting worse.     You have new or worsening pain in your legs.     You do not get better as expected. Where can you learn more? Go to https://[employer].nxtControl. org and sign in to your [facility] account. Enter R035 in the [facility] box to learn more about \"Leg and Ankle Edema: Care Instructions. \"     If you do not have an account, please click on the \"Sign Up Now\" link. Current as of: July 1, 2021               Content Version: 13.1  © 2658-1077 [employer], [employer]. Care instructions adapted under license by [facility] [employer] [facility] ([facility]).  If you have questions about a medical condition or this instruction, always ask your healthcare professional. Philippe Gr any warranty or liability for your use of this information.

## 2022-03-02 NOTE — PROGRESS NOTES
Medicare Annual Wellness Visit    Hina Palmer is here for Medicare AWV (labs already done, no issues or concerns)    Assessment & Plan   Tamiko Figueredo was seen today for medicare awv. Diagnoses and all orders for this visit:    Medicare annual wellness visit, subsequent    Thrombocytopenia ([address]  I reviewed his labs with him. Platelets were 650,562. He is followed by [address] [place] [address] and sees them regularly. This is stable  Hyperlipidemia, unspecified hyperlipidemia type  -     Cancel: Lipid Panel  I reviewed his cholesterol. Well-controlled it was excellent with an LDL of 22, normal triglycerides and an HDL of 48. The only way to increase his HDL is exercise. He will continue his Crestor 5 mg 1 tablet once a day  Anemia due to vitamin B12 deficiency, unspecified B12 deficiency type  -     Cancel: Vitamin B12  I reviewed his CBC. He is not anemic at the present time and his B12 level was within normal limits. Well-controlled  Essential hypertension  -     Cancel: Comprehensive Metabolic Panel  Blood pressure is well controlled at 130/64. I reviewed his CMP which was normal except for a glucose of 126 and a GFR of 59 but he was fasting. Respiratory crackles 1/4 way up posterior chest wall on right side  -     XR CHEST (2 VW)  This is something new. I am going to go ahead and get a chest x-ray today. We will contact him when we get results. He is followed by Dr. Alesha Aponte cardiology  Leg swelling  This is probably due to venous stasis changes. Dorsalis pedis pulses are excellent. This is a relatively new problem. We talked about elevating his feet for an hour every afternoon or wearing compression knee-high's. If it worsens he is to call.        Recommendations for Preventive Services Due: see orders and patient instructions/AVS.  Recommended screening schedule for the next 5-10 years is provided to the patient in written form: see Patient Instructions/AVS.     Return in 1 year (on 3/2/2023) for Medicare Annual Wellness Visit in 1 year. Subjective   He complained of his balance but I think it is more likely to be a little bit of hypotension because it happens at night when he stands up suddenly after sleeping. We reviewed his labs and his medications. We also reviewed all of the labs and problems mentioned in the assessment and plan. We also talked about losing weight. Patient's complete Health Risk Assessment and screening values have been reviewed and are found in Flowsheets. The following problems were reviewed today and where indicated follow up appointments were made and/or referrals ordered. Positive Risk Factor Screenings with Interventions:               General Health and ACP:  General  In general, how would you say your health is?: Very Good  In the past 7 days, have you experienced any of the following: New or Increased Pain, New or Increased Fatigue, Loneliness, Social Isolation, Stress or Anger?: No  Do you get the social and emotional support that you need?: Yes    Advance Directives     Power of  Living Will ACP-Advance Directive ACP-Power of     Not on File Not on File Not on File Not on File      General Health Risk Interventions:  · We do not know why this fired unless it was his weight. Blood pressure was good and he answered all of the questions appropriately.     Health Habits/Nutrition:     Physical Activity: Unknown    Days of Exercise per Week: 0 days    Minutes of Exercise per Session: Not on file     Have you lost any weight without trying in the past 3 months?: No  Body mass index: (!) 34.31  Have you seen the dentist within the past year?: Yes    Health Habits/Nutrition Interventions:  · Nutritional issues:  educational materials for healthy, well-balanced diet provided    Hearing/Vision:  Do you or your family notice any trouble with your hearing that hasn't been managed with hearing aids?: (!) Yes  Do you have difficulty driving, watching TV, or doing any of your daily activities because of your eyesight?: No  Have you had an eye exam within the past year?: Yes  No exam data present    Hearing/Vision Interventions:  · Hearing concerns:  If hearing worsens, patient is to call and I will refer him to the audiologist.            Objective   Vitals:    03/02/22 0851   BP: 130/64   Pulse: 76   Resp: 16   Temp: 97.4 °F (36.3 °C)   TempSrc: Temporal   SpO2: 95%   Weight: 246 lb (111.6 kg)   Height: 5' 11\" (1.803 m)      Body mass index is 34.31 kg/m². General Appearance: alert and oriented to person, place and time, well developed and well- nourished, in no acute distress. Slightly overweight. Skin: warm and dry, no rash or erythema  Head: normocephalic and atraumatic  Eyes: pupils equal, round, and reactive to light, extraocular eye movements intact, conjunctivae normal  ENT: tympanic membrane, external ear and ear canal normal bilaterally, nose without deformity, nasal mucosa and turbinates normal without polyps  Neck: supple and non-tender without mass, no thyromegaly or thyroid nodules, no cervical lymphadenopathy  Pulmonary/Chest: clear to auscultation bilaterally- no wheezes or rhonchi, faint rales in the right lower lung field, normal air movement, no respiratory distress  Cardiovascular: normal rate, regular rhythm, normal S1 and S2, no murmurs, rubs, clicks, or gallops, distal pulses intact, no carotid bruits  Abdomen: soft, non-tender, non-distended, normal bowel sounds, no masses or organomegaly  Extremities: no cyanosis, clubbing . Trace edema in lower legs bilaterally but dorsalis pedis pulses 2+ and equal  Musculoskeletal: normal range of motion, no joint swelling, deformity or tenderness  Neurologic: reflexes normal and symmetric, no cranial nerve deficit, gait, coordination and speech normal       No Known Allergies  Prior to Visit Medications    Medication Sig Taking?  Authorizing Provider   indapamide (LOZOL) 1.25 MG tablet Take 1 tablet by mouth 2 times daily Yes Francis Grajeda MD   cloNIDine (CATAPRES) 0.1 MG tablet TAKE 1 TABLET TWICE DAILY Yes Francis Grajeda MD   metFORMIN (GLUCOPHAGE) 500 MG tablet TAKE 2 TABLETS BY MOUTH 2 TIMES DAILY (WITH MEALS) FOR DIABETES. STOP JANUVIA Yes Francis Grajeda MD   allopurinol (ZYLOPRIM) 100 MG tablet TAKE 1 TABLET TWICE DAILY Yes Francis Grajeda MD   vitamin B-12 (CYANOCOBALAMIN) 1000 MCG tablet Take 1 tablet by mouth daily Yes Chelsey Wyman PA-C   ranolazine (RANEXA) 500 MG extended release tablet Take 1 tablet by mouth 2 times daily Yes Francis Grajeda MD   amLODIPine (NORVASC) 10 MG tablet TAKE 1 TABLET EVERY NIGHT Yes Francis Grajeda MD   rosuvastatin (CRESTOR) 5 MG tablet 1 tablet by mouth at bedtime for high cholesterol Yes Francis Grajeda MD   clopidogrel (PLAVIX) 75 MG tablet Take 75 mg by mouth daily Yes Historical Provider, MD   carvedilol (COREG) 3.125 MG tablet Take 3.125 mg by mouth 2 times daily (with meals) Yes Historical Provider, MD   blood glucose test strips (CONTOUR NEXT TEST) strip 1 each by In Vitro route daily As needed.  Yes Francis Grajeda MD   aspirin 81 MG tablet Take 81 mg by mouth daily Yes Historical Provider, MD   Ascorbic Acid (VITAMIN C) 250 MG tablet Take 500 mg by mouth daily  Yes Historical Provider, MD   vitamin E 1000 UNITS capsule Take 400 Units by mouth daily  Yes Historical Provider, MD   furosemide (LASIX) 20 MG tablet   Historical Provider, MD [place] (Including outside providers/suppliers regularly involved in providing care):   Patient Care Team:  Francis Grajeda MD as PCP - General (Family Medicine)  Francis Grajeda MD as PCP - [facility] Empaneled Provider  No Team Members    Reviewed and updated this visit:  Tobacco  Allergies  Meds  Problems  Med Hx  Surg Hx  Soc Hx  Fam Hx

## 2022-04-04 ENCOUNTER — OFFICE VISIT (OUTPATIENT)
Dept: CARDIOLOGY | Facility: CLINIC | Age: 82
End: 2022-04-04

## 2022-04-04 VITALS
WEIGHT: 245 LBS | DIASTOLIC BLOOD PRESSURE: 78 MMHG | SYSTOLIC BLOOD PRESSURE: 146 MMHG | OXYGEN SATURATION: 96 % | HEIGHT: 70 IN | HEART RATE: 56 BPM | BODY MASS INDEX: 35.07 KG/M2

## 2022-04-04 DIAGNOSIS — I25.118 CORONARY ARTERY DISEASE OF NATIVE ARTERY OF NATIVE HEART WITH STABLE ANGINA PECTORIS: Primary | ICD-10-CM

## 2022-04-04 DIAGNOSIS — I10 PRIMARY HYPERTENSION: ICD-10-CM

## 2022-04-04 DIAGNOSIS — I51.9 LEFT VENTRICULAR SYSTOLIC DYSFUNCTION: ICD-10-CM

## 2022-04-04 DIAGNOSIS — E78.2 MIXED HYPERLIPIDEMIA: ICD-10-CM

## 2022-04-04 PROCEDURE — 93000 ELECTROCARDIOGRAM COMPLETE: CPT | Performed by: NURSE PRACTITIONER

## 2022-04-04 PROCEDURE — 99214 OFFICE O/P EST MOD 30 MIN: CPT | Performed by: NURSE PRACTITIONER

## 2022-04-04 RX ORDER — ROSUVASTATIN CALCIUM 20 MG/1
20 TABLET, COATED ORAL NIGHTLY
Qty: 90 TABLET | Refills: 3 | Status: SHIPPED | OUTPATIENT
Start: 2022-04-04 | End: 2022-12-09 | Stop reason: SDUPTHER

## 2022-04-04 NOTE — PROGRESS NOTES
Subjective:     Encounter Date: 04/04/2022      Patient ID: Misael Mcgill is a 82 y.o. male.    Chief Complaint: routine f/u cad    History of Present Illness     Mr. Mcgill returns today for follow-up regarding his CAD.    I saw the patient in clinic in June 2021.  At that visit, I recommended he start cardiac rehab.  It was noted that his PCP had switched him from atorvastatin 40 mg to rosuvastatin 5 mg nightly for some suspected myalgias and cramping at night, but I did mention that the change had improved his symptoms, though the symptoms themselves were somewhat atypical of what would be expected with statins.  He was doing well and was euvolemic and his shortness of breath did not improve at all with previous trial of Lasix.    Dr. Crawford initially met the patient in March 2021 when he was evaluated for shortness of breath.  An echocardiogram showed mild dysfunction so Dr. Crawford then performed a nuclear perfusion test that was also abnormal so a cardiac catheterization was performed in May 2021, showing multivessel disease, the most severe which was a focal 90% mid RCA stenosis that corresponded to the area of abnormality on stress test.  He also had moderate to severe proximal circumflex stenosis, and a severe focal stenosis in the midportion of the circumflex extending into OM1 that was rather small.  There was no left main or LAD disease, so the benefit of CABG was not there and Dr. Crawford did proceed with PCI of the RCA.  Given the complexity of the procedure, in an effort to conserve contrast in the setting of his kidney disease the procedure was stopped and he later came back to the Cath Lab on 5/24/2021 and underwent successful placement of a drug-eluting stent to the RCA.    The patient saw Dr. Crawford in September 2021 and was accompanied by his wife.  He was unsure if the stent made his breathing better, but did not think so.  He reported that he was working very hard with a bush hog, and was not  "limited by any breathing issues at all.  He also denied orthopnea, PND, weight changes or leg swelling.  He reported he woke up 1 night prior to that visit with severe shortness of breath so he applied the pulse oximeter and his oxygen saturation was 92%.  He reported he did not trust his home blood pressure cuff.  At that visit, rosuvastatin was uptitrated from 5 mg to 10 mg, with the goal being to get him to 20 mg if tolerated without myalgias.    Today the patient reports he has been doing well overall.  He states he recently returned from a hunting trip and was around a child that had a respiratory illness and he developed \"a cold.\"  He states he is recovering from that but still has a little bit of a cough with sputum production.  He denies any significant shortness of breath, orthopnea, PND, rapid weight gain, chest pain, palpitations, syncope or presyncope.  He does report some chronic lower extremity edema.  He believes his blood pressure is typically well controlled.  He is tolerating the rosuvastatin 10 mg without any myalgias.  He does have some bruising on his right upper arm from where he would hold the gun when hunting, but denies any other signs of bleeding.    The following portions of the patient's history were reviewed and updated as appropriate: allergies, current medications, past family history, past medical history, past social history, past surgical history and problem list.    Review of Systems   Constitutional: Negative for malaise/fatigue.   Cardiovascular: Negative for chest pain, claudication, dyspnea on exertion, leg swelling, near-syncope, orthopnea, palpitations, paroxysmal nocturnal dyspnea and syncope.   Respiratory: Positive for cough and sputum production. Negative for shortness of breath.    Hematologic/Lymphatic: Does not bruise/bleed easily.   Musculoskeletal: Negative for falls.   Gastrointestinal: Negative for bloating.   Neurological: Negative for dizziness, light-headedness " and weakness.           Current Outpatient Medications:   •  allopurinol (ZYLOPRIM) 100 MG tablet, allopurinol 100 mg tablet, Disp: , Rfl:   •  amLODIPine (NORVASC) 10 MG tablet, amlodipine 10 mg tablet, Disp: , Rfl:   •  aspirin 81 MG EC tablet, Take 81 mg by mouth Daily., Disp: , Rfl:   •  carvedilol (COREG) 3.125 MG tablet, Take 1 tablet by mouth 2 (Two) Times a Day With Meals., Disp: 180 tablet, Rfl: 3  •  cloNIDine (CATAPRES) 0.1 MG tablet, 0.1 mg Daily., Disp: , Rfl:   •  clopidogrel (PLAVIX) 75 MG tablet, Take 1 tablet by mouth Daily., Disp: 90 tablet, Rfl: 3  •  furosemide (LASIX) 20 MG tablet, , Disp: , Rfl:   •  indapamide (LOZOL) 1.25 MG tablet, Take 1 tablet by mouth 2 (two) times a day., Disp: 180 tablet, Rfl: 4  •  metFORMIN (GLUCOPHAGE) 500 MG tablet, Take 2 tablets by mouth 2 (Two) Times a Day With Meals. HOLD FOR 48 HRS POST CATH, Disp: , Rfl:   •  nitroglycerin (NITROSTAT) 0.4 MG SL tablet, 1 under the tongue as needed for angina, may repeat q5mins for up three doses, Disp: 25 tablet, Rfl: 1  •  ranolazine (Ranexa) 500 MG 12 hr tablet, Take 1 tablet by mouth 2 (Two) Times a Day., Disp: 180 tablet, Rfl: 4  •  rosuvastatin (CRESTOR) 20 MG tablet, Take 1 tablet by mouth Every Night., Disp: 90 tablet, Rfl: 3  •  vitamin C (ASCORBIC ACID) 250 MG tablet, Take 250 mg by mouth., Disp: , Rfl:   •  vitamin E 1000 UNIT capsule, Take 1,000 Units by mouth., Disp: , Rfl:        Objective:      Vitals:    04/04/22 1451   BP: 146/78   Pulse: 56   SpO2: 96%   Weight : 245 lbs    Vitals and nursing note reviewed.   Constitutional:       General: Not in acute distress.     Appearance: Not in distress.   Neck:      Vascular: No JVD or JVR. JVD normal.   Pulmonary:      Effort: Pulmonary effort is normal.      Breath sounds: Normal breath sounds.   Cardiovascular:      Normal rate. Regular rhythm.      Murmurs: There is a grade 2/6 systolic murmur.   Edema:     Peripheral edema (1-2+ BLE edema ) present.  Skin:      General: Skin is warm and dry.   Neurological:      Mental Status: Alert, oriented to person, place, and time and oriented to person, place and time.         Lab Review:   Lab Results   Component Value Date    CHOL 107 05/05/2021    CHLPL 92 (L) 02/25/2022    TRIG 134 02/25/2022    HDL 43 (L) 02/25/2022    LDL 22 02/25/2022     Lab Results   Component Value Date    GLUCOSE 126 (H) 02/25/2022    BUN 19 02/25/2022    CREATININE 1.2 02/25/2022    EGFRIFNONA 58 (A) 02/25/2022    EGFRIFAFRI >59 02/25/2022    BCR 17.2 05/25/2021    K 4.7 02/25/2022    CO2 25 02/25/2022    CALCIUM 9.7 02/25/2022    ALBUMIN 4.6 02/25/2022    AST 25 02/25/2022    ALT 39 02/25/2022     Lab Results   Component Value Date    HGBA1C 5.8 02/25/2022           ECG 12 Lead    Date/Time: 4/4/2022 4:55 PM  Performed by: Dariela Chauhan APRN  Authorized by: Dariela Chauhan APRN   Comparison: compared with previous ECG from 9/17/2021  Similar to previous ECG  Comparison to previous ECG: qtc 482  Rhythm: sinus rhythm  Ectopy: atrial premature contractions  BPM: 73  Conduction: 1st degree AV block and non-specific intraventricular conduction delay  Other findings: non-specific ST-T wave changes        4/2021 echo:    · Estimated left ventricular EF = 45% Left ventricular systolic function is mildly decreased. There is global hypokinesis.  · Left ventricular wall thickness is consistent with mild concentric hypertrophy.  · Left ventricular diastolic function is consistent with (grade I) impaired relaxation.  · Left atrial volume is mildly increased.  · Estimated right ventricular systolic pressure from tricuspid regurgitation is normal (<35 mmHg).  · No significant valvular pathology.          Assessment/Plan:     Problem List Items Addressed This Visit        Cardiac and Vasculature    Hypertension    Hyperlipidemia    Relevant Medications    rosuvastatin (CRESTOR) 20 MG tablet    Left ventricular systolic dysfunction    Coronary artery disease of native  artery of native heart with stable angina pectoris (HCC) - Primary    Overview     Stable angina / CCS III on OMT with known coronary artery disease (dx with cath 5/5/2021)-severe RCA stenosis status post successful PCI to the mid RCA with a 3.5 x 15 mm drug-eluting stent on 5/24/2021.  (Also has known moderate to severe stenosis in the proximal circumflex, and a severe focal stenosis in a small mid circumflex extending into OM1 noted on initial cath on 5/5)               Recommendations/plans:  1. Coronary artery disease: Stable, no angina.  Additionally, he is not complaining of any significant shortness of breath, so as previously noted by Dr. Crawford no reason to consider further revascularization at this time.   - Continue aspirin 81 mg daily indefinitely without interruption.  - Continue Plavix 75 mg daily until at least 05/2022; as noted above, he does have some bruising of the right upper arm after a hunting trip (over the area where his gun was held), but denies any bleeding issues otherwise.  Therefore, we will continue this beyond May 2022 at this time.  He will call back with any bleeding issues.  - Continue statin with instructions as per below.  - Continue Coreg, calcium channel blocker, and Ranexa.  - Has nitroglycerin to use sublingual as-needed.    2. Essential hypertension: Slightly elevated, but generally well-controlled, so continue current regimen, which includes Coreg, Indapamide, amlodipine, Lasix and clonidine.    3. Mixed hyperlipidemia: He is tolerating rosuvastatin 10 mg nightly, so we will go ahead and uptitrate this to 20 mg nightly-we will plan to continue this dose to maintain LDL less than 70..  He was previously intolerant to atorvastatin 40 mg due to lifestyle limiting myalgias.  Most recent LDL well controlled at 22.    4. Mild LV dysfunction (EF 45% on echocardiogram 04/02/2021): He does have some lower extremity edema, but no signs of central volume overload.  He takes low-dose  Lasix.  He denies any significant dyspnea, sudden weight gain, orthopnea, PND-he will call with development of any of the symptoms.  Continue current medications.  Low-sodium diet.  Use compression stockings.    Follow up in 6 months or sooner with new or worsening symptoms.    EPIFANIO Godwin

## 2022-04-14 RX ORDER — AMLODIPINE BESYLATE 10 MG/1
TABLET ORAL
Qty: 90 TABLET | Refills: 3 | Status: SHIPPED | OUTPATIENT
Start: 2022-04-14

## 2022-04-29 RX ORDER — INDAPAMIDE 1.25 MG/1
1.25 TABLET, FILM COATED ORAL 2 TIMES DAILY
Qty: 180 TABLET | Refills: 3 | Status: SHIPPED | OUTPATIENT
Start: 2022-04-29 | End: 2022-04-29 | Stop reason: SDUPTHER

## 2022-04-29 RX ORDER — INDAPAMIDE 1.25 MG/1
1.25 TABLET, FILM COATED ORAL 2 TIMES DAILY
Qty: 180 TABLET | Refills: 3 | Status: SHIPPED | OUTPATIENT
Start: 2022-04-29

## 2022-06-07 ENCOUNTER — TELEPHONE (OUTPATIENT)
Dept: HEMATOLOGY | Age: 82
End: 2022-06-07

## 2022-06-07 ENCOUNTER — HOSPITAL ENCOUNTER (OUTPATIENT)
Dept: INFUSION THERAPY | Age: 82
End: 2022-06-07

## 2022-06-07 NOTE — TELEPHONE ENCOUNTER
Called pt in regards to missed 06-07 appt and pt stated that he does not want to be seen by Vanna Heredia PA-C at this present time because he \"had multiple scans done a few months ago and heard nothing back from him and it cost him too much money. \"  Informed nurse at time of call and making chart note. Informed pt that if he needs to be seen in future to call our office. Please advise if any further action needs to be taken. Thank you.

## 2022-07-01 RX ORDER — RANOLAZINE 500 MG/1
TABLET, EXTENDED RELEASE ORAL
Qty: 180 TABLET | Refills: 3 | Status: SHIPPED | OUTPATIENT
Start: 2022-07-01

## 2022-07-08 RX ORDER — RANOLAZINE 500 MG/1
500 TABLET, EXTENDED RELEASE ORAL 2 TIMES DAILY
Qty: 60 TABLET | Refills: 3 | Status: SHIPPED | OUTPATIENT
Start: 2022-07-08

## 2022-07-18 RX ORDER — CARVEDILOL 3.12 MG/1
3.12 TABLET ORAL 2 TIMES DAILY WITH MEALS
Qty: 180 TABLET | Refills: 3 | Status: SHIPPED | OUTPATIENT
Start: 2022-07-18

## 2022-07-28 RX ORDER — CLOPIDOGREL BISULFATE 75 MG/1
75 TABLET ORAL DAILY
Qty: 90 TABLET | Refills: 3 | Status: SHIPPED | OUTPATIENT
Start: 2022-07-28

## 2022-09-06 DIAGNOSIS — I10 ESSENTIAL HYPERTENSION: Primary | ICD-10-CM

## 2022-09-06 DIAGNOSIS — E11.9 TYPE 2 DIABETES MELLITUS WITHOUT COMPLICATION, WITHOUT LONG-TERM CURRENT USE OF INSULIN (HCC): ICD-10-CM

## 2022-09-06 LAB
ALBUMIN SERPL-MCNC: 4.4 G/DL (ref 3.5–5.2)
ALP BLD-CCNC: 68 U/L (ref 40–130)
ALT SERPL-CCNC: 28 U/L (ref 5–41)
ANION GAP SERPL CALCULATED.3IONS-SCNC: 13 MMOL/L (ref 7–19)
AST SERPL-CCNC: 24 U/L (ref 5–40)
BILIRUB SERPL-MCNC: 0.4 MG/DL (ref 0.2–1.2)
BUN BLDV-MCNC: 27 MG/DL (ref 8–23)
CALCIUM SERPL-MCNC: 10 MG/DL (ref 8.8–10.2)
CHLORIDE BLD-SCNC: 100 MMOL/L (ref 98–111)
CO2: 24 MMOL/L (ref 22–29)
CREAT SERPL-MCNC: 1.4 MG/DL (ref 0.5–1.2)
GFR AFRICAN AMERICAN: 58
GFR NON-AFRICAN AMERICAN: 48
GLUCOSE BLD-MCNC: 141 MG/DL (ref 74–109)
HBA1C MFR BLD: 5.7 % (ref 4–6)
POTASSIUM SERPL-SCNC: 4.7 MMOL/L (ref 3.5–5)
SODIUM BLD-SCNC: 137 MMOL/L (ref 136–145)
TOTAL PROTEIN: 7.2 G/DL (ref 6.6–8.7)

## 2022-09-07 ENCOUNTER — OFFICE VISIT (OUTPATIENT)
Dept: PRIMARY CARE CLINIC | Age: 82
End: 2022-09-07
Payer: MEDICARE

## 2022-09-07 VITALS
OXYGEN SATURATION: 97 % | BODY MASS INDEX: 33.63 KG/M2 | HEART RATE: 80 BPM | DIASTOLIC BLOOD PRESSURE: 70 MMHG | WEIGHT: 240.2 LBS | TEMPERATURE: 97.4 F | HEIGHT: 71 IN | SYSTOLIC BLOOD PRESSURE: 136 MMHG

## 2022-09-07 DIAGNOSIS — E11.9 TYPE 2 DIABETES MELLITUS WITHOUT COMPLICATION, WITHOUT LONG-TERM CURRENT USE OF INSULIN (HCC): ICD-10-CM

## 2022-09-07 DIAGNOSIS — N28.9 DECREASED RENAL FUNCTION: ICD-10-CM

## 2022-09-07 DIAGNOSIS — I10 ESSENTIAL HYPERTENSION: Primary | ICD-10-CM

## 2022-09-07 PROBLEM — H18.20 CHANDLER SYNDROME: Status: ACTIVE | Noted: 2021-11-08

## 2022-09-07 PROBLEM — H35.369 RETINAL DRUSEN: Status: ACTIVE | Noted: 2021-11-08

## 2022-09-07 PROBLEM — H21.269 CHANDLER SYNDROME: Status: ACTIVE | Noted: 2021-11-08

## 2022-09-07 PROBLEM — H25.9 AGE-RELATED CATARACT: Status: ACTIVE | Noted: 2021-11-08

## 2022-09-07 PROBLEM — H40.50X0 CHANDLER SYNDROME: Status: ACTIVE | Noted: 2021-11-08

## 2022-09-07 PROCEDURE — 1036F TOBACCO NON-USER: CPT | Performed by: FAMILY MEDICINE

## 2022-09-07 PROCEDURE — 1123F ACP DISCUSS/DSCN MKR DOCD: CPT | Performed by: FAMILY MEDICINE

## 2022-09-07 PROCEDURE — 99214 OFFICE O/P EST MOD 30 MIN: CPT | Performed by: FAMILY MEDICINE

## 2022-09-07 PROCEDURE — 3044F HG A1C LEVEL LT 7.0%: CPT | Performed by: FAMILY MEDICINE

## 2022-09-07 PROCEDURE — G8427 DOCREV CUR MEDS BY ELIG CLIN: HCPCS | Performed by: FAMILY MEDICINE

## 2022-09-07 PROCEDURE — G8417 CALC BMI ABV UP PARAM F/U: HCPCS | Performed by: FAMILY MEDICINE

## 2022-09-07 RX ORDER — ROSUVASTATIN CALCIUM 20 MG/1
TABLET, COATED ORAL
COMMUNITY
Start: 2022-07-17

## 2022-09-07 SDOH — ECONOMIC STABILITY: FOOD INSECURITY: WITHIN THE PAST 12 MONTHS, YOU WORRIED THAT YOUR FOOD WOULD RUN OUT BEFORE YOU GOT MONEY TO BUY MORE.: NEVER TRUE

## 2022-09-07 SDOH — ECONOMIC STABILITY: FOOD INSECURITY: WITHIN THE PAST 12 MONTHS, THE FOOD YOU BOUGHT JUST DIDN'T LAST AND YOU DIDN'T HAVE MONEY TO GET MORE.: NEVER TRUE

## 2022-09-07 ASSESSMENT — PATIENT HEALTH QUESTIONNAIRE - PHQ9
SUM OF ALL RESPONSES TO PHQ QUESTIONS 1-9: 0
SUM OF ALL RESPONSES TO PHQ QUESTIONS 1-9: 0
SUM OF ALL RESPONSES TO PHQ9 QUESTIONS 1 & 2: 0
SUM OF ALL RESPONSES TO PHQ QUESTIONS 1-9: 0
2. FEELING DOWN, DEPRESSED OR HOPELESS: 0
SUM OF ALL RESPONSES TO PHQ QUESTIONS 1-9: 0
1. LITTLE INTEREST OR PLEASURE IN DOING THINGS: 0

## 2022-09-07 ASSESSMENT — ENCOUNTER SYMPTOMS
RESPIRATORY NEGATIVE: 1
DIARRHEA: 0

## 2022-09-07 ASSESSMENT — SOCIAL DETERMINANTS OF HEALTH (SDOH): HOW HARD IS IT FOR YOU TO PAY FOR THE VERY BASICS LIKE FOOD, HOUSING, MEDICAL CARE, AND HEATING?: NOT HARD AT ALL

## 2022-09-07 NOTE — PROGRESS NOTES
SUBJECTIVE:    Allan Ascencio is 79 y.o.male who comes in complaining of Follow-up (6 months: Pt voices that he is having a \"terrible time with his balance\",) and Diabetes (Pt voices that he keeps up with his numbers and to him his DM seems to be under control.)   . HPI: We did labs yesterday. He is here today for follow-up of his diabetes. His wife is controlling his diet and his hemoglobin A1c is excellent. He says his balance is getting worse. This has been going on for the past 2 years. He does not want to go to physical therapy. He says it is too expensive. He denies headache or palpitations. He says when he fell recently it was because he was tired and he knew better. He stands up for a few seconds before he starts to walk and that has helped. No Known Allergies    Social History     Socioeconomic History    Marital status:      Spouse name: None    Number of children: 3    Years of education: None    Highest education level: None   Occupational History     Employer: [employer]   Tobacco Use    Smoking status: Never    Smokeless tobacco: Never   Vaping Use    Vaping Use: Never used   Substance and Sexual Activity    Alcohol use: No    Drug use: No     Social Determinants of Health     Financial Resource Strain: Low Risk     Difficulty of Paying Living Expenses: Not hard at all   Food Insecurity: No Food Insecurity    Worried About Running Out of Food in the Last Year: Never true    Ran Out of Food in the Last Year: Never true   Physical Activity: Unknown    Days of Exercise per Week: 0 days       Review of Systems   Constitutional: Negative. Negative for activity change and fatigue. Eyes:  Negative for visual disturbance. Respiratory: Negative. Cardiovascular: Negative. Gastrointestinal:  Negative for diarrhea. Endocrine: Negative for polydipsia, polyphagia and polyuria. Genitourinary:  Negative for dysuria. Neurological:  Negative for weakness and numbness. and Rhythm: Normal rate and regular rhythm. Heart sounds: Normal heart sounds. Pulmonary:      Effort: Pulmonary effort is normal.      Breath sounds: Normal breath sounds. Musculoskeletal:      Cervical back: Normal range of motion and neck supple. Skin:     General: Skin is warm and dry. Neurological:      Mental Status: He is alert and oriented to person, place, and time. Psychiatric:         Mood and Affect: Mood normal.         Behavior: Behavior normal.         Thought Content: Thought content normal.         Judgment: Judgment normal.       ASSESSMENT:    1. Essential hypertension Controlled   2. Decreased renal function New Problem   3. Type 2 diabetes mellitus without complication, without long-term current use of insulin ([facility]) Controlled     Modifiers are above. Plan is below    PLAN:  1. Essential hypertension  2. Decreased renal function  -     Basic Metabolic Panel; Future  3. Type 2 diabetes mellitus without complication, without long-term current use of insulin [facility])   Patient Instructions     Lab Review   Orders Only on 09/06/2022   Component Date Value    Sodium 09/06/2022 137     Potassium 09/06/2022 4.7     Chloride 09/06/2022 100     CO2 09/06/2022 24     Anion Gap 09/06/2022 13     Glucose 09/06/2022 141 (A)    BUN 09/06/2022 27 (A)    Creatinine 09/06/2022 1.4 (A)    GFR Non- 09/06/2022 48 (A)    GFR  09/06/2022 58 (A)    Calcium 09/06/2022 10.0     Total Protein 09/06/2022 7.2     Albumin 09/06/2022 4.4     Total Bilirubin 09/06/2022 0.4     Alkaline Phosphatase 09/06/2022 68     ALT 09/06/2022 28     AST 09/06/2022 24     Hemoglobin A1C 09/06/2022 5.7    I reviewed his labs with him. Kidney function was decreased at 48. Glucose was 141 but hemoglobin A1c was excellent at 5.7. Essential hypertension is well controlled. Continue amlodipine 10 mg 1 capsule once a day. He is having no angina or palpitations.   He is refusing physical therapy at this time. We did talk about falls. I did do the get up and go test and he was able to get up out of the chair without assistance walk across the room and come back within 15 seconds. Type 2 diabetes is very well controlled. Because it is so well controlled and because his kidney function is slightly decreased which is a new problem I am going to decrease his metformin 500 mg to twice a day. We will decrease his indapamide 1.25 mg to 1 tablet once a day and see if this helps his kidney function when I recheck it in 3 months. You are doing a great job keeping your sugars down. Your hemoglobin A1c was 5.7 which is well within the normal limits. However, the way the kidneys are filtering was just a little bit off. A test called the GFR or glomerular filtration rate was low at 48. This can be caused by medicine such as Aleve or ibuprofen. It can also be caused by fluid pills. Therefore I would like you to decrease the indapamide (Lozol) 1.25 mg tablet that you take twice a day. I would like you to take it once a day around lunch instead of twice a day. You may notice that your feet might swell a little bit more and I want you to monitor your blood pressure more closely. You can run by here in 2 or 3 weeks and have our nurse recheck your blood pressure to make sure it is staying down. Make sure you are drinking plenty of water. Because your sugar is so good you may cut the metformin 500 mg down to 1 tablet twice a day before a meal instead of 2 tablets. I would like to see you again in 3 months to recheck your kidney function. You can do the labs a day before and then see me. If your blood pressure starts to go up we will have to do something different. GOOD JOB on losing weight!                       Wt Readings from Last 3 Encounters:   09/07/22 240 lb 3.2 oz (109 kg)   03/02/22 246 lb (111.6 kg)   02/08/22 253 lb 8 oz (115 kg)    We are committed to providing you with the best care possible. In order to help us achieve these goals please remember to bring all medications, herbal products, and over the counter supplements with you to each visit. If your provider has ordered testing for you, please be sure to follow up with our office if you have not received results within 7 days after the testing took place. *If you receive a survey after visiting one of our offices, please take time to share your experience concerning your physician office visit. These surveys are confidential and no health information about you is shared. We are eager to improve for you and we are counting on your feedback to help make that happen. Follow-up:  Return in about 3 months (around 12/7/2022) for Recheck kidney function with me, labs a day before. PATIENT INSTRUCTIONS:  Patient Instructions               You are doing a great job keeping your sugars down. Your hemoglobin A1c was 5.7 which is well within the normal limits. However, the way the kidneys are filtering was just a little bit off. A test called the GFR or glomerular filtration rate was low at 48. This can be caused by medicine such as Aleve or ibuprofen. It can also be caused by fluid pills. Therefore I would like you to decrease the indapamide (Lozol) 1.25 mg tablet that you take twice a day. I would like you to take it once a day around lunch instead of twice a day. You may notice that your feet might swell a little bit more and I want you to monitor your blood pressure more closely. You can run by here in 2 or 3 weeks and have our nurse recheck your blood pressure to make sure it is staying down. Make sure you are drinking plenty of water. Because your sugar is so good you may cut the metformin 500 mg down to 1 tablet twice a day before a meal instead of 2 tablets. I would like to see you again in 3 months to recheck your kidney function.   You can do the labs a day before and then see me. If your blood pressure starts to go up we will have to do something different. GOOD JOB on losing weight! Wt Readings from Last 3 Encounters:   09/07/22 240 lb 3.2 oz (109 kg)   03/02/22 246 lb (111.6 kg)   02/08/22 253 lb 8 oz (115 kg)    We are committed to providing you with the best care possible. In order to help us achieve these goals please remember to bring all medications, herbal products, and over the counter supplements with you to each visit. If your provider has ordered testing for you, please be sure to follow up with our office if you have not received results within 7 days after the testing took place. *If you receive a survey after visiting one of our offices, please take time to share your experience concerning your physician office visit. These surveys are confidential and no health information about you is shared. We are eager to improve for you and we are counting on your feedback to help make that happen. EMR Dragon/transcription disclaimer:  Much of this encounter note is electronic transcription/translation of spoken language to printed texts. The electronic translation of spoken language may be erroneous, or at times, nonsensical words or phrases may beinadvertently transcribed.   Although I have reviewed the note for such errors, some may still exist.

## 2022-09-07 NOTE — PATIENT INSTRUCTIONS
You are doing a great job keeping your sugars down. Your hemoglobin A1c was 5.7 which is well within the normal limits. However, the way the kidneys are filtering was just a little bit off. A test called the GFR or glomerular filtration rate was low at 48. This can be caused by medicine such as Aleve or ibuprofen. It can also be caused by fluid pills. Therefore I would like you to decrease the indapamide (Lozol) 1.25 mg tablet that you take twice a day. I would like you to take it once a day around lunch instead of twice a day. You may notice that your feet might swell a little bit more and I want you to monitor your blood pressure more closely. You can run by here in 2 or 3 weeks and have our nurse recheck your blood pressure to make sure it is staying down. Make sure you are drinking plenty of water. Because your sugar is so good you may cut the metformin 500 mg down to 1 tablet twice a day before a meal instead of 2 tablets. I would like to see you again in 3 months to recheck your kidney function. You can do the labs a day before and then see me. If your blood pressure starts to go up we will have to do something different. GOOD JOB on losing weight! Wt Readings from Last 3 Encounters:   09/07/22 240 lb 3.2 oz (109 kg)   03/02/22 246 lb (111.6 kg)   02/08/22 253 lb 8 oz (115 kg)    We are committed to providing you with the best care possible. In order to help us achieve these goals please remember to bring all medications, herbal products, and over the counter supplements with you to each visit. If your provider has ordered testing for you, please be sure to follow up with our office if you have not received results within 7 days after the testing took place. *If you receive a survey after visiting one of our offices, please take time to share your experience concerning your physician office visit.  These surveys are confidential and no health information about you is shared. We are eager to improve for you and we are counting on your feedback to help make that happen.

## 2022-09-30 ENCOUNTER — TELEPHONE (OUTPATIENT)
Dept: CARDIOLOGY | Facility: CLINIC | Age: 82
End: 2022-09-30

## 2022-09-30 NOTE — TELEPHONE ENCOUNTER
Does not need antibiotics.  Continue aspirin without interruption.  Whether or not to hold the Plavix is up to the person performing the procedure.  If they want the Plavix held, the recommendation is to hold this for approximately 5 days prior to the procedure and resume it when felt safe to do so afterward.  Thank you.

## 2022-09-30 NOTE — TELEPHONE ENCOUNTER
The patient is due to have 1 tooth extraction and 2 implants placed with [facility].  They are inquiring if the patient will require pre and/or post surgery antibiotics and if he needs to hold his blood thinner.  Please advise.  Thank you!

## 2022-10-13 ENCOUNTER — OFFICE VISIT (OUTPATIENT)
Dept: CARDIOLOGY | Facility: CLINIC | Age: 82
End: 2022-10-13

## 2022-10-13 VITALS
HEIGHT: 70 IN | OXYGEN SATURATION: 98 % | BODY MASS INDEX: 34.79 KG/M2 | SYSTOLIC BLOOD PRESSURE: 140 MMHG | DIASTOLIC BLOOD PRESSURE: 68 MMHG | HEART RATE: 73 BPM | WEIGHT: 243 LBS

## 2022-10-13 DIAGNOSIS — E78.2 MIXED HYPERLIPIDEMIA: ICD-10-CM

## 2022-10-13 DIAGNOSIS — I50.22 CHRONIC SYSTOLIC HEART FAILURE: ICD-10-CM

## 2022-10-13 DIAGNOSIS — I10 PRIMARY HYPERTENSION: ICD-10-CM

## 2022-10-13 DIAGNOSIS — I25.118 CORONARY ARTERY DISEASE OF NATIVE ARTERY OF NATIVE HEART WITH STABLE ANGINA PECTORIS: Primary | ICD-10-CM

## 2022-10-13 PROCEDURE — 99214 OFFICE O/P EST MOD 30 MIN: CPT | Performed by: INTERNAL MEDICINE

## 2022-10-13 PROCEDURE — 93000 ELECTROCARDIOGRAM COMPLETE: CPT | Performed by: INTERNAL MEDICINE

## 2022-10-13 RX ORDER — LANOLIN ALCOHOL/MO/W.PET/CERES
1000 CREAM (GRAM) TOPICAL DAILY
COMMUNITY

## 2022-10-13 NOTE — PROGRESS NOTES
Subjective:     Encounter Date:10/13/2022      Patient ID: Misael Mcgill is a 82 y.o. male.    Chief Complaint: Routine follow-up of coronary disease, CHF    History of Present Illness  Mr. Mcgill returns today after having last been seen on 04/04/2022. He was doing well at that time, not having any angina or equivalents, and therefore, no further consideration was given at the time for revascularization despite catheterization performed in 05/2022 showing residual disease including a moderate-to-severe stenosis in the proximal circumflex, and a severe focal stenosis in a small distal portion of the circumflex extending into OM1. Of note, he did receive a drug-eluting stent at that time to a severe stenosis of the RCA with a 3.5 x 15 mm drug-eluting stent to the mid portion of the vessel. He remains on dual antiplatelet therapy, and returns today on other appropriate guideline-directed therapies for routine follow-up.    He has been experiencing noticeable intermittent orthopnea for the last 2-3 months. He cannot correlate any connection to activity, what he has eaten or drank on the days he experiences orthopnea. He keeps the head of his bed elevated.     He also notes some mild leg swelling, left greater than right; however, he denies any exertional dyspnea or chest discomfort of any kind. His weight has been steady. He is taking Lasix 20 mg daily along with other medical therapies.    He denies any chest discomfort.    The following portions of the patient's history were reviewed and updated as appropriate: allergies, current medications, past family history, past medical history, past social history, past surgical history and problem list.    Review of Systems   Constitutional: Negative for malaise/fatigue.   Cardiovascular: Positive for leg swelling and orthopnea. Negative for chest pain, claudication, dyspnea on exertion, near-syncope, palpitations, paroxysmal nocturnal dyspnea and syncope.    Respiratory: Negative for shortness of breath.    Hematologic/Lymphatic: Does not bruise/bleed easily.           Current Outpatient Medications:   •  allopurinol (ZYLOPRIM) 100 MG tablet, allopurinol 100 mg tablet, Disp: , Rfl:   •  amLODIPine (NORVASC) 10 MG tablet, amlodipine 10 mg tablet, Disp: , Rfl:   •  aspirin 81 MG EC tablet, Take 81 mg by mouth Daily., Disp: , Rfl:   •  carvedilol (COREG) 3.125 MG tablet, Take 1 tablet by mouth 2 (Two) Times a Day With Meals., Disp: 180 tablet, Rfl: 3  •  cloNIDine (CATAPRES) 0.1 MG tablet, 0.1 mg Daily., Disp: , Rfl:   •  clopidogrel (PLAVIX) 75 MG tablet, Take 1 tablet by mouth Daily., Disp: 90 tablet, Rfl: 3  •  furosemide (LASIX) 20 MG tablet, Daily., Disp: , Rfl:   •  indapamide (LOZOL) 1.25 MG tablet, Take 1 tablet by mouth 2 (two) times a day., Disp: 180 tablet, Rfl: 4  •  metFORMIN (GLUCOPHAGE) 500 MG tablet, Take 2 tablets by mouth 2 (Two) Times a Day With Meals. HOLD FOR 48 HRS POST CATH, Disp: , Rfl:   •  nitroglycerin (NITROSTAT) 0.4 MG SL tablet, 1 under the tongue as needed for angina, may repeat q5mins for up three doses, Disp: 25 tablet, Rfl: 1  •  ranolazine (RANEXA) 500 MG 12 hr tablet, TAKE 1 TABLET TWICE DAILY, Disp: 180 tablet, Rfl: 3  •  rosuvastatin (CRESTOR) 20 MG tablet, Take 1 tablet by mouth Every Night., Disp: 90 tablet, Rfl: 3  •  vitamin B-12 (CYANOCOBALAMIN) 1000 MCG tablet, Take 1 tablet by mouth Daily., Disp: , Rfl:   •  vitamin C (ASCORBIC ACID) 250 MG tablet, Take 250 mg by mouth., Disp: , Rfl:   •  vitamin E 1000 UNIT capsule, Take 1,000 Units by mouth., Disp: , Rfl:        Objective:      Vitals:    10/13/22 0812   BP: 140/68   Pulse: 73   SpO2: 98%     Vitals and nursing note reviewed.   Constitutional:       General: Not in acute distress.     Appearance: Not in distress.   Neck:      Vascular: No JVD or JVR. JVD normal.   Pulmonary:      Effort: Pulmonary effort is normal.      Breath sounds: Normal breath sounds.      Comments:  Mild crackles at the left base.  Cardiovascular:      Normal rate. Regular rhythm.      Murmurs: There is no murmur.      No gallop. No rub.   Pulses:     Intact distal pulses.   Edema:     Ankle: 1+ edema of the left ankle.  Skin:     General: Skin is warm and dry.   Neurological:      Mental Status: Alert, oriented to person, place, and time and oriented to person, place and time.         Lab Review:         ECG 12 Lead    Date/Time: 10/13/2022 4:11 PM  Performed by: Bill Crawford MD  Authorized by: Bill Crawford MD   Comparison: compared with previous ECG from 4/4/2022  Similar to previous ECG  Rhythm: sinus rhythm  Rate: normal  BPM: 73  Conduction: 1st degree AV block  QRS axis: normal  Other findings: left ventricular hypertrophy and prolonged QTc interval    Clinical impression: abnormal EKG  Comments: V2 misplacement          Assessment/Plan:     Problem List Items Addressed This Visit        Cardiac and Vasculature    Hypertension    Hyperlipidemia    Chronic systolic heart failure (HCC)    Relevant Orders    BNP    Comprehensive Metabolic Panel    Adult Transthoracic Echo Complete w/ Color, Spectral and Contrast if necessary per protocol    Coronary artery disease of native artery of native heart with stable angina pectoris (HCC) - Primary    Overview     Stable angina / CCS III on OMT with known coronary artery disease (dx with cath 5/5/2021)-severe RCA stenosis status post successful PCI to the mid RCA with a 3.5 x 15 mm drug-eluting stent on 5/24/2021.  (Also has known moderate to severe stenosis in the proximal circumflex, and a severe focal stenosis in a small mid circumflex extending into OM1 noted on initial cath on 5/5)          Assessment/Plan:  1. Chronic systolic heart failure: Stage B, NYHA 1: EF 45% last year. He is now complaining of some orthopnea intermittently, which may be related to variation in daily dietary intake (fluids, salt, etc), though cannot say for sure.  - We will repeat  echocardiogram to make sure ventricular function is no worse.  - We will also ask that BNP and CMP be checked with next labs being ordered by Dr. Russo.  - Otherwise, no changes in therapy at this time, but we will advise potential changes after we have seen the above (continue Coreg 3.125 twice daily, blood pressure lowering meds, daily diuretic)    2. Coronary artery disease: Stable. Remains angina-free, and has no exertional dyspnea to suggest an equivalent.  - Continue appropriate guideline-directed medical therapy including dual antiplatelets plus high-intensity statin.    3. Essential hypertension: Well-controlled. Continue current regimen.    4. Mixed hyperlipidemia: Continue high-intensity statin to maintain goal LDL < 70.      Transcribed from ambient dictation for Bill Crawford MD by Katerina Alvarado.  10/13/22   10:28 CDT    Patient or patient representative verbalized consent to the visit recording.    I Bill Crawford MD have personally performed the services described in this document as scribed by the above individual, and it is both accurate and complete.   I have edited each component as needed.    Bill Crawford MD  10/13/2022  22:20 CDT

## 2022-10-27 ENCOUNTER — HOSPITAL ENCOUNTER (OUTPATIENT)
Dept: CARDIOLOGY | Facility: HOSPITAL | Age: 82
Discharge: HOME | End: 2022-10-27
Admitting: INTERNAL MEDICINE

## 2022-10-27 VITALS
WEIGHT: 243 LBS | SYSTOLIC BLOOD PRESSURE: 140 MMHG | DIASTOLIC BLOOD PRESSURE: 68 MMHG | HEIGHT: 70 IN | BODY MASS INDEX: 34.79 KG/M2

## 2022-10-27 DIAGNOSIS — I50.22 CHRONIC SYSTOLIC HEART FAILURE: ICD-10-CM

## 2022-10-27 PROCEDURE — 25010000002 PERFLUTREN 6.52 MG/ML SUSPENSION: Performed by: INTERNAL MEDICINE

## 2022-10-27 PROCEDURE — 93306 TTE W/DOPPLER COMPLETE: CPT

## 2022-10-27 PROCEDURE — 93306 TTE W/DOPPLER COMPLETE: CPT | Performed by: INTERNAL MEDICINE

## 2022-10-27 RX ADMIN — PERFLUTREN 13.04 MG: 6.52 INJECTION, SUSPENSION INTRAVENOUS at 10:52

## 2022-10-31 LAB
BH CV ECHO MEAS - ACS: 2.1 CM
BH CV ECHO MEAS - AO MAX PG: 9.6 MMHG
BH CV ECHO MEAS - AO MEAN PG: 6 MMHG
BH CV ECHO MEAS - AO ROOT DIAM: 3.7 CM
BH CV ECHO MEAS - AO V2 MAX: 155 CM/SEC
BH CV ECHO MEAS - AO V2 VTI: 37.6 CM
BH CV ECHO MEAS - AVA(I,D): 3.3 CM2
BH CV ECHO MEAS - EDV(CUBED): 103.8 ML
BH CV ECHO MEAS - EDV(MOD-SP2): 100 ML
BH CV ECHO MEAS - EDV(MOD-SP4): 150 ML
BH CV ECHO MEAS - EF(MOD-BP): 56.9 %
BH CV ECHO MEAS - EF(MOD-SP2): 62 %
BH CV ECHO MEAS - EF(MOD-SP4): 55.2 %
BH CV ECHO MEAS - ESV(CUBED): 45.1 ML
BH CV ECHO MEAS - ESV(MOD-SP2): 38 ML
BH CV ECHO MEAS - ESV(MOD-SP4): 67.2 ML
BH CV ECHO MEAS - FS: 24.3 %
BH CV ECHO MEAS - IVS/LVPW: 0.94 CM
BH CV ECHO MEAS - IVSD: 1.04 CM
BH CV ECHO MEAS - LA DIMENSION: 5.1 CM
BH CV ECHO MEAS - LAT PEAK E' VEL: 9.7 CM/SEC
BH CV ECHO MEAS - LV DIASTOLIC VOL/BSA (35-75): 66.2 CM2
BH CV ECHO MEAS - LV MASS(C)D: 181.6 GRAMS
BH CV ECHO MEAS - LV MAX PG: 5.9 MMHG
BH CV ECHO MEAS - LV MEAN PG: 3 MMHG
BH CV ECHO MEAS - LV SYSTOLIC VOL/BSA (12-30): 29.6 CM2
BH CV ECHO MEAS - LV V1 MAX: 121 CM/SEC
BH CV ECHO MEAS - LV V1 VTI: 27.2 CM
BH CV ECHO MEAS - LVIDD: 4.7 CM
BH CV ECHO MEAS - LVIDS: 3.6 CM
BH CV ECHO MEAS - LVOT AREA: 4.5 CM2
BH CV ECHO MEAS - LVOT DIAM: 2.4 CM
BH CV ECHO MEAS - LVPWD: 1.11 CM
BH CV ECHO MEAS - MED PEAK E' VEL: 6 CM/SEC
BH CV ECHO MEAS - MR MAX PG: 60.2 MMHG
BH CV ECHO MEAS - MR MAX VEL: 388 CM/SEC
BH CV ECHO MEAS - MV A MAX VEL: 102 CM/SEC
BH CV ECHO MEAS - MV DEC SLOPE: 387 CM/SEC2
BH CV ECHO MEAS - MV E MAX VEL: 101 CM/SEC
BH CV ECHO MEAS - MV E/A: 0.99
BH CV ECHO MEAS - MV P1/2T: 78.7 MSEC
BH CV ECHO MEAS - MVA(P1/2T): 2.8 CM2
BH CV ECHO MEAS - PA V2 MAX: 113 CM/SEC
BH CV ECHO MEAS - RAP SYSTOLE: 5 MMHG
BH CV ECHO MEAS - RV MAX PG: 2.27 MMHG
BH CV ECHO MEAS - RV V1 MAX: 75.4 CM/SEC
BH CV ECHO MEAS - RVDD: 3.8 CM
BH CV ECHO MEAS - RVSP: 46.5 MMHG
BH CV ECHO MEAS - SI(MOD-SP2): 27.3 ML/M2
BH CV ECHO MEAS - SI(MOD-SP4): 36.5 ML/M2
BH CV ECHO MEAS - SV(LVOT): 123 ML
BH CV ECHO MEAS - SV(MOD-SP2): 62 ML
BH CV ECHO MEAS - SV(MOD-SP4): 82.8 ML
BH CV ECHO MEAS - TAPSE (>1.6): 2.8 CM
BH CV ECHO MEAS - TR MAX PG: 41.5 MMHG
BH CV ECHO MEAS - TR MAX VEL: 322 CM/SEC
BH CV ECHO MEASUREMENTS AVERAGE E/E' RATIO: 12.87
BH CV XLRA - RV BASE: 4.9 CM
LEFT ATRIUM VOLUME INDEX: 41.8 ML/M2
LEFT ATRIUM VOLUME: 77.6 ML
MAXIMAL PREDICTED HEART RATE: 137 BPM
STRESS TARGET HR: 116 BPM

## 2022-11-01 ENCOUNTER — TELEPHONE (OUTPATIENT)
Dept: CARDIOLOGY | Facility: CLINIC | Age: 82
End: 2022-11-01

## 2022-11-01 NOTE — TELEPHONE ENCOUNTER
----- Message from Bill Crawford MD sent at 10/31/2022  3:35 PM CDT -----  Please let patient know his echocardiogram looked okay.  No significant change compared to the previous one from April of last year.    Will await seeing results of blood work that was mentioned in my note from our visit on 10/13 (to be drawn with upcoming labs through his PCP), before deciding on any potential medication changes to be recommended.

## 2022-11-01 NOTE — TELEPHONE ENCOUNTER
The patient is called and notified of his echo result and he voices understanding.  Annetta Alvarado MA

## 2022-11-07 RX ORDER — AMLODIPINE BESYLATE 10 MG/1
TABLET ORAL
Qty: 90 TABLET | Refills: 3 | Status: SHIPPED | OUTPATIENT
Start: 2022-11-07

## 2022-11-28 ENCOUNTER — OFFICE VISIT (OUTPATIENT)
Dept: PRIMARY CARE CLINIC | Age: 82
End: 2022-11-28
Payer: MEDICARE

## 2022-11-28 VITALS
DIASTOLIC BLOOD PRESSURE: 70 MMHG | HEART RATE: 69 BPM | OXYGEN SATURATION: 94 % | WEIGHT: 256.2 LBS | TEMPERATURE: 98.8 F | HEIGHT: 71 IN | BODY MASS INDEX: 35.87 KG/M2 | SYSTOLIC BLOOD PRESSURE: 130 MMHG

## 2022-11-28 DIAGNOSIS — U09.9 POST-COVID CHRONIC COUGH: ICD-10-CM

## 2022-11-28 DIAGNOSIS — R05.3 POST-COVID CHRONIC COUGH: ICD-10-CM

## 2022-11-28 DIAGNOSIS — H10.9 BACTERIAL CONJUNCTIVITIS: Primary | ICD-10-CM

## 2022-11-28 PROCEDURE — 3074F SYST BP LT 130 MM HG: CPT | Performed by: FAMILY MEDICINE

## 2022-11-28 PROCEDURE — G8427 DOCREV CUR MEDS BY ELIG CLIN: HCPCS | Performed by: FAMILY MEDICINE

## 2022-11-28 PROCEDURE — 1036F TOBACCO NON-USER: CPT | Performed by: FAMILY MEDICINE

## 2022-11-28 PROCEDURE — 1123F ACP DISCUSS/DSCN MKR DOCD: CPT | Performed by: FAMILY MEDICINE

## 2022-11-28 PROCEDURE — 99214 OFFICE O/P EST MOD 30 MIN: CPT | Performed by: FAMILY MEDICINE

## 2022-11-28 PROCEDURE — G8417 CALC BMI ABV UP PARAM F/U: HCPCS | Performed by: FAMILY MEDICINE

## 2022-11-28 PROCEDURE — 3078F DIAST BP <80 MM HG: CPT | Performed by: FAMILY MEDICINE

## 2022-11-28 PROCEDURE — G8483 FLU IMM NO ADMIN DOC REA: HCPCS | Performed by: FAMILY MEDICINE

## 2022-11-28 RX ORDER — BENZONATATE 200 MG/1
200 CAPSULE ORAL 3 TIMES DAILY PRN
Qty: 60 CAPSULE | Refills: 1 | Status: SHIPPED | OUTPATIENT
Start: 2022-11-28 | End: 2022-12-18

## 2022-11-28 RX ORDER — OFLOXACIN 3 MG/ML
2 SOLUTION/ DROPS OPHTHALMIC 4 TIMES DAILY
Qty: 10 ML | Refills: 0 | Status: SHIPPED | OUTPATIENT
Start: 2022-11-28 | End: 2022-12-08

## 2022-11-28 ASSESSMENT — ENCOUNTER SYMPTOMS
ABDOMINAL PAIN: 0
EYE DISCHARGE: 1
SHORTNESS OF BREATH: 0
CHEST TIGHTNESS: 0
WHEEZING: 0
BLOOD IN STOOL: 0
EYE ITCHING: 1
EYE REDNESS: 1
COUGH: 1

## 2022-11-28 NOTE — PROGRESS NOTES
Anupam Whitaker (:  1940) is a 79 y.o. male,Established patient, here for evaluation of the following chief complaint(s):  Cough, Congestion (Pt had Covid 3 weeks ago and voices that he can not get rid of the cough and congestion.//Pt wife voices that pt walks really unsteady. Pt does not currently use a DME for his balance.), and Eye Drainage (Pt voices his grand daughter had pink eye last week and his started bothering him two days ago)         ASSESSMENT/PLAN:  1. Bacterial conjunctivitis  2. Post-COVID chronic cough    Patient's ongoing cough likely secondary to prior COVID-19. I discussed with patient that it is not uncommon for this to persist well over a month however it may persist even longer than several months depending upon the individual patient. Patient was agreeable to trying benzonatate at this time as needed for cough. Patient encouraged to maintain good hydration and that symptoms should improve with time. Patient's bilateral eye purulent discharge concerning for bacterial conjunctivitis. Will treat with ofloxacin drops. Patient encouraged to keep his hands washed and minimize contact with others to avoid spread. Return if symptoms worsen or fail to improve. Subjective   SUBJECTIVE/OBJECTIVE:  Anupam Whitaker is a 79 y.o. male who presents due to pinkeye and persistent cough. Patient says he has had pinkeye which has been very itchy for the last 2 days. Patient says that he probably caught it from his granddaughter who has had similar symptoms. Patient notes that he has had some crusting in the mornings and yellow drainage from the eyes. Patient says that it is occurring bilaterally. Patient also notes that he has been having a persistent cough throughout the day particularly at night which has been ongoing since he had COVID several weeks ago. Patient says he has been taking Tylenol with no improvement. Patient denies any fever at this time.   Patient says he did have the full COVID vaccination series. No other acute concerns at this time      Review of Systems   Constitutional:  Negative for activity change and fever. HENT:  Negative for congestion. Eyes:  Positive for discharge, redness and itching. Negative for visual disturbance. Respiratory:  Positive for cough. Negative for chest tightness, shortness of breath and wheezing. Cardiovascular:  Negative for chest pain. Gastrointestinal:  Negative for abdominal pain and blood in stool. Genitourinary:  Negative for difficulty urinating and urgency. Neurological:  Negative for weakness and headaches. Psychiatric/Behavioral:  Negative for confusion. Objective   Physical Exam  Vitals reviewed. Constitutional:       General: He is not in acute distress. Appearance: Normal appearance. He is not ill-appearing. HENT:      Head: Normocephalic and atraumatic. Mouth/Throat:      Pharynx: No posterior oropharyngeal erythema. Eyes:      General:         Right eye: Discharge present. Left eye: Discharge present. Extraocular Movements: Extraocular movements intact. Conjunctiva/sclera:      Right eye: Right conjunctiva is injected. Exudate (purulent) present. Left eye: Left conjunctiva is injected. Exudate (purulent) present. Cardiovascular:      Rate and Rhythm: Normal rate and regular rhythm. Pulses: Normal pulses. Heart sounds: Normal heart sounds. No murmur heard. Pulmonary:      Effort: Pulmonary effort is normal. No respiratory distress. Breath sounds: Normal breath sounds. No wheezing or rhonchi. Chest:      Chest wall: No tenderness. Abdominal:      General: Abdomen is flat. Bowel sounds are normal. There is no distension. Palpations: Abdomen is soft. Tenderness: There is no abdominal tenderness. Musculoskeletal:         General: No swelling. Skin:     General: Skin is warm and dry.    Neurological:      General: No focal deficit present. Mental Status: He is alert. Vitals:    11/28/22 1412   BP: 130/70   Pulse: 69   Temp: 98.8 °F (37.1 °C)   SpO2: 94%        Current Outpatient Medications   Medication Sig Dispense Refill    ofloxacin (OCUFLOX) 0.3 % solution Place 2 drops into the right eye 4 times daily for 10 days 10 mL 0    benzonatate (TESSALON) 200 MG capsule Take 1 capsule by mouth 3 times daily as needed for Cough 60 capsule 1    amLODIPine (NORVASC) 10 MG tablet TAKE 1 TABLET EVERY NIGHT 90 tablet 3    rosuvastatin (CRESTOR) 20 MG tablet       ranolazine (RANEXA) 500 MG extended release tablet Take 1 tablet by mouth 2 times daily 60 tablet 3    indapamide (LOZOL) 1.25 MG tablet Take 1 tablet by mouth 2 times daily 180 tablet 3    furosemide (LASIX) 20 MG tablet       cloNIDine (CATAPRES) 0.1 MG tablet TAKE 1 TABLET TWICE DAILY 180 tablet 3    metFORMIN (GLUCOPHAGE) 500 MG tablet TAKE 2 TABLETS BY MOUTH 2 TIMES DAILY (WITH MEALS) FOR DIABETES. STOP JANUVIA 360 tablet 3    allopurinol (ZYLOPRIM) 100 MG tablet TAKE 1 TABLET TWICE DAILY 180 tablet 3    vitamin B-12 (CYANOCOBALAMIN) 1000 MCG tablet Take 1 tablet by mouth daily 90 tablet 1    clopidogrel (PLAVIX) 75 MG tablet Take 75 mg by mouth daily      carvedilol (COREG) 3.125 MG tablet Take 3.125 mg by mouth 2 times daily (with meals)      blood glucose test strips (CONTOUR NEXT TEST) strip 1 each by In Vitro route daily As needed. 100 each 3    aspirin 81 MG tablet Take 81 mg by mouth daily      Ascorbic Acid (VITAMIN C) 250 MG tablet Take 500 mg by mouth daily       vitamin E 1000 UNITS capsule Take 400 Units by mouth daily        No current facility-administered medications for this visit.       Family History   Problem Relation Age of Onset    No Known Problems Mother     No Known Problems Father       Past Medical History:   Diagnosis Date    Acid reflux     GERD (gastroesophageal reflux disease)     Hx of blood clots     left leg    Hypertension       Past Surgical History:   Procedure Laterality Date    CARDIOVASCULAR STRESS TEST  07/2015    CAROTID ENDARTERECTOMY Left 6/28/2016    CAROTID ENDARTERECTOMY WITH GORETEX ACUSEAL PATCH ANGIOPLASTY performed by Slim Alvarez MD at [facility]      (L) blood clot    VASCULAR SURGERY  7/14/2015 [facility]    [place] right carotid endarterectomy       No Known Allergies     Lab Results   Component Value Date     09/06/2022    K 4.7 09/06/2022     09/06/2022    CO2 24 09/06/2022    BUN 27 (H) 09/06/2022    CREATININE 1.4 (H) 09/06/2022    GLUCOSE 141 (H) 09/06/2022    CALCIUM 10.0 09/06/2022    PROT 7.2 09/06/2022    LABALBU 4.4 09/06/2022    BILITOT 0.4 09/06/2022    ALKPHOS 68 09/06/2022    AST 24 09/06/2022    ALT 28 09/06/2022    LABGLOM 48 (A) 09/06/2022    GFRAA 58 (L) 09/06/2022    AGRATIO 1.1 10/11/2021    GLOB 2.8 02/08/2022        Lab Results   Component Value Date    WBC 6.5 02/25/2022    HGB 14.9 02/25/2022    HCT 44.3 02/25/2022    MCV 97.8 (H) 02/25/2022     (L) 02/25/2022                EMR Dragon/transcription disclaimer:  Much of this encounter note is electronic transcription/translation of spoken language toprinted texts. The electronic translation of spoken language may be erroneous, or at times, nonsensical words or phrases may be inadvertently transcribed. Although I have reviewed the note for such errors, some may stillexist.      An electronic signature was used to authenticate this note.     --Rosa Elena Lockhart MD

## 2022-11-30 ENCOUNTER — TELEPHONE (OUTPATIENT)
Dept: PRIMARY CARE CLINIC | Age: 82
End: 2022-11-30

## 2022-11-30 RX ORDER — ALBUTEROL SULFATE 90 UG/1
2 AEROSOL, METERED RESPIRATORY (INHALATION) 4 TIMES DAILY PRN
Qty: 18 G | Refills: 0 | Status: SHIPPED | OUTPATIENT
Start: 2022-11-30

## 2022-12-01 ENCOUNTER — TELEPHONE (OUTPATIENT)
Dept: PRIMARY CARE CLINIC | Age: 82
End: 2022-12-01

## 2022-12-01 NOTE — TELEPHONE ENCOUNTER
Wife states Pt has Covid Cough and cant sleep. Has not slept in 6 weeks. Has to sit up at night.  Asking for something to help him sleep

## 2022-12-02 ENCOUNTER — APPOINTMENT (OUTPATIENT)
Dept: GENERAL RADIOLOGY | Age: 82
DRG: 195 | End: 2022-12-02
Payer: MEDICARE

## 2022-12-02 ENCOUNTER — HOSPITAL ENCOUNTER (INPATIENT)
Age: 82
LOS: 1 days | Discharge: HOME | DRG: 195 | End: 2022-12-03
Attending: EMERGENCY MEDICINE
Payer: MEDICARE

## 2022-12-02 DIAGNOSIS — J18.9 PNEUMONIA OF BOTH LOWER LOBES DUE TO INFECTIOUS ORGANISM: Primary | ICD-10-CM

## 2022-12-02 PROBLEM — J15.9 COMMUNITY ACQUIRED BACTERIAL PNEUMONIA: Status: ACTIVE | Noted: 2022-12-02

## 2022-12-02 LAB
ALBUMIN SERPL-MCNC: 3.4 G/DL (ref 3.5–5.2)
ALP BLD-CCNC: 87 U/L (ref 40–130)
ALT SERPL-CCNC: 96 U/L (ref 5–41)
ANION GAP SERPL CALCULATED.3IONS-SCNC: 11 MMOL/L (ref 7–19)
AST SERPL-CCNC: 83 U/L (ref 5–40)
BACTERIA: NEGATIVE /HPF
BASOPHILS ABSOLUTE: 0 K/UL (ref 0–0.2)
BASOPHILS RELATIVE PERCENT: 0.1 % (ref 0–1)
BILIRUB SERPL-MCNC: 0.6 MG/DL (ref 0.2–1.2)
BILIRUBIN URINE: NEGATIVE
BLOOD, URINE: NEGATIVE
BUN BLDV-MCNC: 23 MG/DL (ref 8–23)
CALCIUM SERPL-MCNC: 8.9 MG/DL (ref 8.8–10.2)
CHLORIDE BLD-SCNC: 93 MMOL/L (ref 98–111)
CLARITY: CLEAR
CO2: 25 MMOL/L (ref 22–29)
COLOR: YELLOW
CREAT SERPL-MCNC: 1.4 MG/DL (ref 0.5–1.2)
CRYSTALS, UA: ABNORMAL /HPF
EOSINOPHILS ABSOLUTE: 0.1 K/UL (ref 0–0.6)
EOSINOPHILS RELATIVE PERCENT: 0.6 % (ref 0–5)
EPITHELIAL CELLS, UA: 1 /HPF (ref 0–5)
GFR SERPL CREATININE-BSD FRML MDRD: 50 ML/MIN/{1.73_M2}
GLUCOSE BLD-MCNC: 124 MG/DL (ref 74–109)
GLUCOSE BLD-MCNC: 230 MG/DL (ref 70–99)
GLUCOSE BLD-MCNC: 273 MG/DL (ref 70–99)
GLUCOSE URINE: NEGATIVE MG/DL
HCT VFR BLD CALC: 33.9 % (ref 42–52)
HEMOGLOBIN: 11.2 G/DL (ref 14–18)
HYALINE CASTS: 2 /HPF (ref 0–8)
IMMATURE GRANULOCYTES #: 0.1 K/UL
KETONES, URINE: NEGATIVE MG/DL
L. PNEUMOPHILA SEROGP 1 UR AG: NORMAL
LACTIC ACID: 1.1 MMOL/L (ref 0.5–1.9)
LEUKOCYTE ESTERASE, URINE: ABNORMAL
LYMPHOCYTES ABSOLUTE: 1.8 K/UL (ref 1.1–4.5)
LYMPHOCYTES RELATIVE PERCENT: 16.8 % (ref 20–40)
MCH RBC QN AUTO: 32.5 PG (ref 27–31)
MCHC RBC AUTO-ENTMCNC: 33 G/DL (ref 33–37)
MCV RBC AUTO: 98.3 FL (ref 80–94)
MONOCYTES ABSOLUTE: 1.1 K/UL (ref 0–0.9)
MONOCYTES RELATIVE PERCENT: 10.7 % (ref 0–10)
NEUTROPHILS ABSOLUTE: 7.5 K/UL (ref 1.5–7.5)
NEUTROPHILS RELATIVE PERCENT: 71 % (ref 50–65)
NITRITE, URINE: NEGATIVE
PDW BLD-RTO: 13.4 % (ref 11.5–14.5)
PERFORMED ON: ABNORMAL
PERFORMED ON: ABNORMAL
PH UA: 5.5 (ref 5–8)
PLATELET # BLD: 147 K/UL (ref 130–400)
PMV BLD AUTO: 11.1 FL (ref 9.4–12.4)
POTASSIUM SERPL-SCNC: 3.9 MMOL/L (ref 3.5–5)
PRO-BNP: 1220 PG/ML (ref 0–1800)
PROCALCITONIN: 0.15 NG/ML (ref 0–0.09)
PROTEIN UA: ABNORMAL MG/DL
RBC # BLD: 3.45 M/UL (ref 4.7–6.1)
RBC UA: 19 /HPF (ref 0–4)
SARS-COV-2, NAAT: NOT DETECTED
SODIUM BLD-SCNC: 129 MMOL/L (ref 136–145)
SPECIFIC GRAVITY UA: 1.02 (ref 1–1.03)
STREP PNEUMONIAE ANTIGEN, URINE: NORMAL
TOTAL PROTEIN: 6.7 G/DL (ref 6.6–8.7)
TROPONIN: <0.01 NG/ML (ref 0–0.03)
UROBILINOGEN, URINE: 1 E.U./DL
WBC # BLD: 10.6 K/UL (ref 4.8–10.8)
WBC UA: 2 /HPF (ref 0–5)

## 2022-12-02 PROCEDURE — 83605 ASSAY OF LACTIC ACID: CPT

## 2022-12-02 PROCEDURE — 87040 BLOOD CULTURE FOR BACTERIA: CPT

## 2022-12-02 PROCEDURE — 96374 THER/PROPH/DIAG INJ IV PUSH: CPT

## 2022-12-02 PROCEDURE — 2580000003 HC RX 258: Performed by: EMERGENCY MEDICINE

## 2022-12-02 PROCEDURE — 71045 X-RAY EXAM CHEST 1 VIEW: CPT

## 2022-12-02 PROCEDURE — 85025 COMPLETE CBC W/AUTO DIFF WBC: CPT

## 2022-12-02 PROCEDURE — 81001 URINALYSIS AUTO W/SCOPE: CPT

## 2022-12-02 PROCEDURE — 82947 ASSAY GLUCOSE BLOOD QUANT: CPT

## 2022-12-02 PROCEDURE — 6370000000 HC RX 637 (ALT 250 FOR IP)

## 2022-12-02 PROCEDURE — 84145 PROCALCITONIN (PCT): CPT

## 2022-12-02 PROCEDURE — 99285 EMERGENCY DEPT VISIT HI MDM: CPT

## 2022-12-02 PROCEDURE — 87635 SARS-COV-2 COVID-19 AMP PRB: CPT

## 2022-12-02 PROCEDURE — 6360000002 HC RX W HCPCS

## 2022-12-02 PROCEDURE — 2580000003 HC RX 258

## 2022-12-02 PROCEDURE — 87449 NOS EACH ORGANISM AG IA: CPT

## 2022-12-02 PROCEDURE — 80053 COMPREHEN METABOLIC PANEL: CPT

## 2022-12-02 PROCEDURE — 83880 ASSAY OF NATRIURETIC PEPTIDE: CPT

## 2022-12-02 PROCEDURE — 36415 COLL VENOUS BLD VENIPUNCTURE: CPT

## 2022-12-02 PROCEDURE — 84484 ASSAY OF TROPONIN QUANT: CPT

## 2022-12-02 PROCEDURE — 6360000002 HC RX W HCPCS: Performed by: EMERGENCY MEDICINE

## 2022-12-02 PROCEDURE — 93005 ELECTROCARDIOGRAM TRACING: CPT | Performed by: EMERGENCY MEDICINE

## 2022-12-02 PROCEDURE — 1210000000 HC MED SURG R&B

## 2022-12-02 RX ORDER — ONDANSETRON 4 MG/1
4 TABLET, ORALLY DISINTEGRATING ORAL EVERY 8 HOURS PRN
Status: DISCONTINUED | OUTPATIENT
Start: 2022-12-02 | End: 2022-12-03 | Stop reason: HOSPADM

## 2022-12-02 RX ORDER — ASPIRIN 81 MG/1
81 TABLET ORAL DAILY
Status: DISCONTINUED | OUTPATIENT
Start: 2022-12-03 | End: 2022-12-03 | Stop reason: HOSPADM

## 2022-12-02 RX ORDER — ENOXAPARIN SODIUM 100 MG/ML
30 INJECTION SUBCUTANEOUS 2 TIMES DAILY
Status: DISCONTINUED | OUTPATIENT
Start: 2022-12-02 | End: 2022-12-03 | Stop reason: HOSPADM

## 2022-12-02 RX ORDER — SODIUM CHLORIDE 0.9 % (FLUSH) 0.9 %
5-40 SYRINGE (ML) INJECTION EVERY 12 HOURS SCHEDULED
Status: DISCONTINUED | OUTPATIENT
Start: 2022-12-02 | End: 2022-12-03 | Stop reason: HOSPADM

## 2022-12-02 RX ORDER — CIPROFLOXACIN HYDROCHLORIDE 3.5 MG/ML
1 SOLUTION/ DROPS TOPICAL EVERY 6 HOURS SCHEDULED
Status: DISCONTINUED | OUTPATIENT
Start: 2022-12-02 | End: 2022-12-03 | Stop reason: HOSPADM

## 2022-12-02 RX ORDER — OFLOXACIN 3 MG/ML
2 SOLUTION/ DROPS OPHTHALMIC 4 TIMES DAILY
Status: DISCONTINUED | OUTPATIENT
Start: 2022-12-02 | End: 2022-12-02 | Stop reason: CLARIF

## 2022-12-02 RX ORDER — SODIUM CHLORIDE 9 MG/ML
INJECTION, SOLUTION INTRAVENOUS PRN
Status: DISCONTINUED | OUTPATIENT
Start: 2022-12-02 | End: 2022-12-03 | Stop reason: HOSPADM

## 2022-12-02 RX ORDER — POLYETHYLENE GLYCOL 3350 17 G/17G
17 POWDER, FOR SOLUTION ORAL DAILY PRN
Status: DISCONTINUED | OUTPATIENT
Start: 2022-12-02 | End: 2022-12-03 | Stop reason: HOSPADM

## 2022-12-02 RX ORDER — INSULIN LISPRO 100 [IU]/ML
0-4 INJECTION, SOLUTION INTRAVENOUS; SUBCUTANEOUS NIGHTLY
Status: DISCONTINUED | OUTPATIENT
Start: 2022-12-02 | End: 2022-12-03 | Stop reason: HOSPADM

## 2022-12-02 RX ORDER — ACETAMINOPHEN 650 MG/1
650 SUPPOSITORY RECTAL EVERY 6 HOURS PRN
Status: DISCONTINUED | OUTPATIENT
Start: 2022-12-02 | End: 2022-12-03 | Stop reason: HOSPADM

## 2022-12-02 RX ORDER — AMLODIPINE BESYLATE 10 MG/1
10 TABLET ORAL NIGHTLY
Status: DISCONTINUED | OUTPATIENT
Start: 2022-12-02 | End: 2022-12-03 | Stop reason: HOSPADM

## 2022-12-02 RX ORDER — ALBUTEROL SULFATE 90 UG/1
2 AEROSOL, METERED RESPIRATORY (INHALATION) 4 TIMES DAILY PRN
Status: DISCONTINUED | OUTPATIENT
Start: 2022-12-02 | End: 2022-12-03 | Stop reason: HOSPADM

## 2022-12-02 RX ORDER — FUROSEMIDE 20 MG/1
20 TABLET ORAL DAILY
Status: DISCONTINUED | OUTPATIENT
Start: 2022-12-03 | End: 2022-12-03 | Stop reason: HOSPADM

## 2022-12-02 RX ORDER — AZITHROMYCIN 250 MG/1
500 TABLET, FILM COATED ORAL EVERY 24 HOURS
Status: DISCONTINUED | OUTPATIENT
Start: 2022-12-03 | End: 2022-12-03 | Stop reason: HOSPADM

## 2022-12-02 RX ORDER — ROSUVASTATIN CALCIUM 20 MG/1
20 TABLET, COATED ORAL NIGHTLY
Status: DISCONTINUED | OUTPATIENT
Start: 2022-12-02 | End: 2022-12-03 | Stop reason: HOSPADM

## 2022-12-02 RX ORDER — RANOLAZINE 500 MG/1
500 TABLET, EXTENDED RELEASE ORAL 2 TIMES DAILY
Status: DISCONTINUED | OUTPATIENT
Start: 2022-12-02 | End: 2022-12-03 | Stop reason: HOSPADM

## 2022-12-02 RX ORDER — CHOLECALCIFEROL (VITAMIN D3) 125 MCG
1000 CAPSULE ORAL DAILY
Status: DISCONTINUED | OUTPATIENT
Start: 2022-12-03 | End: 2022-12-03 | Stop reason: HOSPADM

## 2022-12-02 RX ORDER — VITAMIN E 268 MG
400 CAPSULE ORAL DAILY
Status: DISCONTINUED | OUTPATIENT
Start: 2022-12-03 | End: 2022-12-03 | Stop reason: HOSPADM

## 2022-12-02 RX ORDER — DEXTROSE MONOHYDRATE 100 MG/ML
INJECTION, SOLUTION INTRAVENOUS CONTINUOUS PRN
Status: DISCONTINUED | OUTPATIENT
Start: 2022-12-02 | End: 2022-12-03 | Stop reason: HOSPADM

## 2022-12-02 RX ORDER — ACETAMINOPHEN 325 MG/1
650 TABLET ORAL EVERY 6 HOURS PRN
Status: DISCONTINUED | OUTPATIENT
Start: 2022-12-02 | End: 2022-12-03 | Stop reason: HOSPADM

## 2022-12-02 RX ORDER — CLOPIDOGREL BISULFATE 75 MG/1
75 TABLET ORAL DAILY
Status: DISCONTINUED | OUTPATIENT
Start: 2022-12-03 | End: 2022-12-03 | Stop reason: HOSPADM

## 2022-12-02 RX ORDER — CLONIDINE HYDROCHLORIDE 0.1 MG/1
0.1 TABLET ORAL 2 TIMES DAILY
Status: DISCONTINUED | OUTPATIENT
Start: 2022-12-02 | End: 2022-12-03 | Stop reason: HOSPADM

## 2022-12-02 RX ORDER — ASCORBIC ACID 500 MG
500 TABLET ORAL DAILY
Status: DISCONTINUED | OUTPATIENT
Start: 2022-12-03 | End: 2022-12-03 | Stop reason: HOSPADM

## 2022-12-02 RX ORDER — SODIUM CHLORIDE 0.9 % (FLUSH) 0.9 %
5-40 SYRINGE (ML) INJECTION PRN
Status: DISCONTINUED | OUTPATIENT
Start: 2022-12-02 | End: 2022-12-03 | Stop reason: HOSPADM

## 2022-12-02 RX ORDER — CARVEDILOL 3.12 MG/1
3.12 TABLET ORAL 2 TIMES DAILY WITH MEALS
Status: DISCONTINUED | OUTPATIENT
Start: 2022-12-02 | End: 2022-12-03 | Stop reason: HOSPADM

## 2022-12-02 RX ORDER — BENZONATATE 100 MG/1
200 CAPSULE ORAL 3 TIMES DAILY PRN
Status: DISCONTINUED | OUTPATIENT
Start: 2022-12-02 | End: 2022-12-03 | Stop reason: HOSPADM

## 2022-12-02 RX ORDER — ALLOPURINOL 100 MG/1
100 TABLET ORAL 2 TIMES DAILY
Status: DISCONTINUED | OUTPATIENT
Start: 2022-12-02 | End: 2022-12-03 | Stop reason: HOSPADM

## 2022-12-02 RX ORDER — ONDANSETRON 2 MG/ML
4 INJECTION INTRAMUSCULAR; INTRAVENOUS EVERY 6 HOURS PRN
Status: DISCONTINUED | OUTPATIENT
Start: 2022-12-02 | End: 2022-12-03 | Stop reason: HOSPADM

## 2022-12-02 RX ORDER — INSULIN LISPRO 100 [IU]/ML
0-4 INJECTION, SOLUTION INTRAVENOUS; SUBCUTANEOUS
Status: DISCONTINUED | OUTPATIENT
Start: 2022-12-02 | End: 2022-12-03 | Stop reason: HOSPADM

## 2022-12-02 RX ADMIN — RANOLAZINE 500 MG: 500 TABLET, FILM COATED, EXTENDED RELEASE ORAL at 20:41

## 2022-12-02 RX ADMIN — CIPROFLOXACIN 1 DROP: 3 SOLUTION OPHTHALMIC at 18:16

## 2022-12-02 RX ADMIN — AMLODIPINE BESYLATE 10 MG: 10 TABLET ORAL at 20:35

## 2022-12-02 RX ADMIN — CIPROFLOXACIN 1 DROP: 3 SOLUTION OPHTHALMIC at 23:13

## 2022-12-02 RX ADMIN — CLONIDINE HYDROCHLORIDE 0.1 MG: 0.1 TABLET ORAL at 20:35

## 2022-12-02 RX ADMIN — BENZONATATE 200 MG: 100 CAPSULE ORAL at 23:12

## 2022-12-02 RX ADMIN — ALLOPURINOL 100 MG: 100 TABLET ORAL at 20:35

## 2022-12-02 RX ADMIN — CARVEDILOL 3.12 MG: 3.12 TABLET, FILM COATED ORAL at 18:16

## 2022-12-02 RX ADMIN — CEFTRIAXONE 1000 MG: 1 INJECTION, POWDER, FOR SOLUTION INTRAMUSCULAR; INTRAVENOUS at 14:30

## 2022-12-02 RX ADMIN — AZITHROMYCIN DIHYDRATE 500 MG: 500 INJECTION, POWDER, LYOPHILIZED, FOR SOLUTION INTRAVENOUS at 14:37

## 2022-12-02 RX ADMIN — INSULIN LISPRO 2 UNITS: 100 INJECTION, SOLUTION INTRAVENOUS; SUBCUTANEOUS at 18:44

## 2022-12-02 RX ADMIN — SODIUM CHLORIDE, PRESERVATIVE FREE 10 ML: 5 INJECTION INTRAVENOUS at 20:35

## 2022-12-02 RX ADMIN — ROSUVASTATIN CALCIUM 20 MG: 20 TABLET, COATED ORAL at 20:35

## 2022-12-02 RX ADMIN — ENOXAPARIN SODIUM 30 MG: 100 INJECTION SUBCUTANEOUS at 20:35

## 2022-12-02 ASSESSMENT — ENCOUNTER SYMPTOMS
EYES NEGATIVE: 1
VOMITING: 0
COUGH: 1
SHORTNESS OF BREATH: 1
BLOOD IN STOOL: 0
SINUS PAIN: 0
SORE THROAT: 0
DIARRHEA: 0
STRIDOR: 0
SORE THROAT: 1
BACK PAIN: 0
NAUSEA: 0
WHEEZING: 0
CHEST TIGHTNESS: 0
RHINORRHEA: 0
ABDOMINAL PAIN: 0

## 2022-12-02 ASSESSMENT — LIFESTYLE VARIABLES
HOW MANY STANDARD DRINKS CONTAINING ALCOHOL DO YOU HAVE ON A TYPICAL DAY: PATIENT DOES NOT DRINK
HOW OFTEN DO YOU HAVE A DRINK CONTAINING ALCOHOL: NEVER

## 2022-12-02 ASSESSMENT — PAIN - FUNCTIONAL ASSESSMENT: PAIN_FUNCTIONAL_ASSESSMENT: NONE - DENIES PAIN

## 2022-12-02 NOTE — ED NOTES
Report called to [facility] on 3rd floor.      Nori Adams RN  12/02/22 [id number]

## 2022-12-02 NOTE — ED PROVIDER NOTES
[facility] EMERGENCY DEPT  eMERGENCY dEPARTMENT eNCOUnter      Pt Name: Ankita Cortez  MRN: [id number]  [place] 1940  Date of evaluation: 12/2/2022  Provider: Shayne Parra MD    [address] [place] [address]       Chief Complaint   Patient presents with    Shortness of Breath     Shortness of breath, placed on 2L NC by EMS. IV established, 125mg solumedrol by EMS, duoneb treatment by EMS. Sats increased from 90% to 95% after neb. EMS reports bigeminy on EKG while in EMS    Cough         HISTORY OF PRESENT ILLNESS   (Location/Symptom, Timing/Onset,Context/Setting, Quality, Duration, Modifying Factors, Severity)  Note limiting factors. Ankita Cortez is a 79 y.o. male who presents to the emergency department for a 2-week history of shortness of breath and cough. Intermittent fevers and chills. Patient had an oxygen saturation of 90% with EMS he was placed on 2 L given 125 mg Solu-Medrol and a DuoNeb by EMS in route. Per report also has having ventricular bigeminy with EMS. He denies any chest pain does admit that he has felt short of breath. Has some leg swelling which is generally chronic and unchanged denies history of congestive heart failure. He has never smoked denies history of COPD. Does have a prior history of a DVT. Tells me he had COVID approximately 1 month ago. He does not wear oxygen at home. HPI    NursingNotes were reviewed. REVIEW OF SYSTEMS    (2-9 systems for level 4, 10 or more for level 5)     Review of Systems   Constitutional:  Positive for chills, fatigue and fever. HENT:  Negative for rhinorrhea and sore throat. Respiratory:  Positive for cough and shortness of breath. Cardiovascular:  Positive for leg swelling. Negative for chest pain. Gastrointestinal:  Negative for abdominal pain, diarrhea, nausea and vomiting. Genitourinary:  Negative for dysuria and frequency. Musculoskeletal:  Negative for back pain and neck pain. Neurological:  Negative for dizziness and headaches.    All other systems reviewed and are negative. PAST MEDICALHISTORY     Past Medical History:   Diagnosis Date    Acid reflux     COVID     GERD (gastroesophageal reflux disease)     Hx of blood clots     left leg    Hypertension          SURGICAL HISTORY       Past Surgical History:   Procedure Laterality Date    CARDIAC CATHETERIZATION      CARDIOVASCULAR STRESS TEST  07/01/2015    CAROTID ENDARTERECTOMY Left 06/28/2016    CAROTID ENDARTERECTOMY WITH GORETEX ACUSEAL PATCH ANGIOPLASTY performed by Eh Davis MD at [facility]      () blood clot    VASCULAR SURGERY  7/14/2015 [facility]    Eversion right carotid endarterectomy          CURRENT MEDICATIONS     Previous Medications    ALBUTEROL SULFATE HFA (VENTOLIN HFA) 108 (90 BASE) MCG/ACT INHALER    Inhale 2 puffs into the lungs 4 times daily as needed for Wheezing    ALLOPURINOL (ZYLOPRIM) 100 MG TABLET    TAKE 1 TABLET TWICE DAILY    AMLODIPINE (NORVASC) 10 MG TABLET    TAKE 1 TABLET EVERY NIGHT    ASCORBIC ACID (VITAMIN C) 250 MG TABLET    Take 500 mg by mouth daily     ASPIRIN 81 MG TABLET    Take 81 mg by mouth daily    BENZONATATE (TESSALON) 200 MG CAPSULE    Take 1 capsule by mouth 3 times daily as needed for Cough    BLOOD GLUCOSE TEST STRIPS (CONTOUR NEXT TEST) STRIP    1 each by In Vitro route daily As needed. CARVEDILOL (COREG) 3.125 MG TABLET    Take 3.125 mg by mouth 2 times daily (with meals)    CLONIDINE (CATAPRES) 0.1 MG TABLET    TAKE 1 TABLET TWICE DAILY    CLOPIDOGREL (PLAVIX) 75 MG TABLET    Take 75 mg by mouth daily    FUROSEMIDE (LASIX) 20 MG TABLET        INDAPAMIDE (LOZOL) 1.25 MG TABLET    Take 1 tablet by mouth 2 times daily    METFORMIN (GLUCOPHAGE) 500 MG TABLET    TAKE 2 TABLETS BY MOUTH 2 TIMES DAILY (WITH MEALS) FOR DIABETES.  STOP JANUVIA    OFLOXACIN (OCUFLOX) 0.3 % SOLUTION    Place 2 drops into the right eye 4 times daily for 10 days    RANOLAZINE (RANEXA) 500 MG EXTENDED RELEASE TABLET    Take 1 tablet by mouth 2 times daily    ROSUVASTATIN (CRESTOR) 20 MG TABLET        VITAMIN B-12 (CYANOCOBALAMIN) 1000 MCG TABLET    Take 1 tablet by mouth daily    VITAMIN E 1000 UNITS CAPSULE    Take 400 Units by mouth daily        ALLERGIES     Patient has no known allergies. FAMILY HISTORY       Family History   Problem Relation Age of Onset    No Known Problems Mother     No Known Problems Father           SOCIAL HISTORY       Social History     Socioeconomic History    Marital status:      Spouse name: None    Number of children: 3    Years of education: None    Highest education level: None   Occupational History     Employer: [employer]   Tobacco Use    Smoking status: Never    Smokeless tobacco: Never   Vaping Use    Vaping Use: Never used   Substance and Sexual Activity    Alcohol use: No    Drug use: No     Social Determinants of Health     Financial Resource Strain: Low Risk     Difficulty of Paying Living Expenses: Not hard at all   Food Insecurity: No Food Insecurity    [facility] Never true    Ran Out of Food in the Last Year: Never true   Physical Activity: Unknown    Days of Exercise per Week: 0 days       SCREENINGS    Cata Coma Scale  Eye Opening: Spontaneous  Best Verbal Response: Oriented  Best Motor Response: Obeys commands  Cata Coma Scale Score: 15        PHYSICAL EXAM    (up to 7 for level 4, 8 or more for level 5)     ED Triage Vitals   BP Temp Temp Source Heart Rate Resp SpO2 Height Weight   12/02/22 1236 12/02/22 1231 12/02/22 1231 12/02/22 1231 12/02/22 1231 -- 12/02/22 1231 12/02/22 1231   (!) 153/74 99 °F (37.2 °C) Oral 80 22  5' 11\" (1.803 m) 256 lb (116.1 kg)       Physical Exam  Vitals and nursing note reviewed. Constitutional:       General: He is not in acute distress. Appearance: He is well-developed. He is not diaphoretic. HENT:      Head: Normocephalic and atraumatic.       Nose: Nose normal.      Mouth/Throat:      Mouth: Mucous membranes are moist.   Eyes:      Conjunctiva/sclera: Conjunctivae normal.   Neck:      Trachea: No tracheal deviation. Cardiovascular:      Rate and Rhythm: Normal rate and regular rhythm. Heart sounds: Normal heart sounds. No murmur heard. Pulmonary:      Effort: No tachypnea or respiratory distress. Breath sounds: Examination of the right-lower field reveals decreased breath sounds. Examination of the left-lower field reveals decreased breath sounds. Decreased breath sounds present. No wheezing or rales. Abdominal:      Palpations: Abdomen is soft. Tenderness: There is no abdominal tenderness. Musculoskeletal:         General: Normal range of motion. Cervical back: Normal range of motion and neck supple. Right lower leg: Edema present. Left lower leg: Edema present. Comments: Bilateral 2+ pitting edema no erythema   Skin:     General: Skin is warm and dry. Neurological:      Mental Status: He is alert and oriented to person, place, and time. DIAGNOSTIC RESULTS     EKG: All EKG's areinterpreted by the Emergency Department Physician who either signs or Co-signs this chart in the absence of a cardiologist.      73 normal sinus rhythm ventricular bigeminy no obvious ST changes  nondiagnostic EKG    RADIOLOGY:  Non-plain film images such as CT, Ultrasound and MRI are read by the radiologist. Plain radiographic images are visualized and preliminarily interpreted bythe emergency physician with the below findings:      XR CHEST PORTABLE   Final Result   Right-greater-than-left patchy bibasilar pulmonary opacities, in conjunction with low lung volumes, suggesting bibasilar atelectasis with probable superimposed infection. Recommendation: Follow-up chest radiograph in 8-12 weeks to assess resolution.      Electronically Signed by Jw Jaeger at 02-Dec-2022 03:17:14 PM                       LABS:  Labs Reviewed   CBC WITH AUTO DIFFERENTIAL - Abnormal; Notable for the following components:       Result Value    RBC 3.45 (*)     Hemoglobin 11.2 (*)     Hematocrit 33.9 (*)     MCV 98.3 (*)     MCH 32.5 (*)     Neutrophils % 71.0 (*)     Lymphocytes % 16.8 (*)     Monocytes % 10.7 (*)     Monocytes Absolute 1.10 (*)     All other components within normal limits   COMPREHENSIVE METABOLIC PANEL - Abnormal; Notable for the following components:    Sodium 129 (*)     Chloride 93 (*)     Glucose 124 (*)     Creatinine 1.4 (*)     Est, Glom Filt Rate 50 (*)     Albumin 3.4 (*)     ALT 96 (*)     AST 83 (*)     All other components within normal limits   COVID-19, RAPID   CULTURE, BLOOD 1   CULTURE, BLOOD 2   BRAIN NATRIURETIC PEPTIDE   TROPONIN   LACTIC ACID       All other labs were within normal range or not returned as of this dictation. EMERGENCY DEPARTMENT COURSE and DIFFERENTIAL DIAGNOSIS/MDM:   Vitals:    Vitals:    12/02/22 1231 12/02/22 1236 12/02/22 1330   BP:  (!) 153/74 134/68   Pulse: 80  73   Resp: 22  17   Temp: 99 °F (37.2 °C)     TempSrc: Oral     SpO2:   95%   Weight: 256 lb (116.1 kg)     Height: 5' 11\" (1.803 m)         MDM     Amount and/or Complexity of Data Reviewed  Clinical lab tests: ordered and reviewed  Tests in the radiology section of CPT®: ordered and reviewed  Independent visualization of images, tracings, or specimens: yes      Patient with a history of COVID approximately 1 month ago gives a 2-week history of cough and shortness of breath. Found to have bilateral pneumonia more notable on the right side than left. Labs are overall reassuring. Borderline hypoxia here doing well on 2 L maintaining a saturation 95% and he is not in respiratory distress. Given post viral pneumonia could be secondary bacterial so did cover with antibiotics.   Given his age and borderline hypoxia have discussed with the hospitalist Dr. Mahogany Olmedo for admission of the [facility].      CONSULTS:  None    PROCEDURES:  Unless otherwise noted below, none     Procedures    FINAL IMPRESSION      1. Pneumonia of both lower lobes due to infectious organism          DISPOSITION/PLAN   DISPOSITION Decision To Admit 12/02/2022 02:18:32 PM      PATIENT REFERRED TO:  No follow-up provider specified.     DISCHARGE MEDICATIONS:  New Prescriptions    No medications on file          (Please note that portions of this note were completed with a voice recognition program.  Efforts were made to edit thedictations but occasionally words are mis-transcribed.)    Rajwinder Shook MD (electronically signed)  Attending Emergency Physician        Eusebia Lora MD  12/02/22 [id number]

## 2022-12-02 NOTE — H&P
[facility])ists      Hospitalist - History & Physical      PCP: Jorge Peña MD    Date of Admission: 12/2/2022    Date of Service: 12/2/2022    Chief Complaint: Shortness of breath    History Of Present Illness:   Mr. Corine Mathew is a pleasant 79 y.o. male with past medical history of HTN, CAD,DVT, hyperlipidemia, GERD, and DM II who presented to [facility] ER complaining of shortness of breath and cough. Patient reports that he was diagnosed with COVID one month ago. His only symptoms at that point were a sore throat and fatigue/weakness. He states that he has felt weak since, however he developed a dry cough approximately 2 weeks ago. Patient presented to [facility] ED today with worsening SOB, fever, and chills. Pt's initial O2 sat per EMS was 90% on RA. He was subsequently placed on 2L via NC, given duoneb and 125mg solu-medrol with improvement in sats and symptoms. He has had a dry cough at home, although he does report coughing up green sputum in ED after receiving duoneb treatment. EMS also reports ventricular bigeminy on cardiac monitor. Pt denies CP, light-headedness, dizziness, n/v/d. He does endorse leg swelling which is baseline for him. Pt is a nonsmoker and denies history of COPD. He is not on home oxygen. Patient was resting comfortably on the ER stretcher and reports improvement in his symptoms.      Past Medical History:        Diagnosis Date    Acid reflux     COVID     GERD (gastroesophageal reflux disease)     Hx of blood clots     left leg    Hypertension        Past Surgical History:        Procedure Laterality Date    CARDIAC CATHETERIZATION      CARDIOVASCULAR STRESS TEST  07/01/2015    CAROTID ENDARTERECTOMY Left 06/28/2016    CAROTID ENDARTERECTOMY WITH GORETEX ACUSEAL PATCH ANGIOPLASTY performed by Jess Noble MD at [address] [place] [address]      (L) blood clot    VASCULAR SURGERY  7/14/2015 JO[facility] Etienne right carotid endarterectomy        Home Medications:  Prior to Admission medications    Medication Sig Start Date End Date Taking? Authorizing Provider   albuterol sulfate HFA (VENTOLIN HFA) 108 (90 Base) MCG/ACT inhaler Inhale 2 puffs into the lungs 4 times daily as needed for Wheezing 11/30/22   Jignesh Michael MD   ofloxacin (OCUFLOX) 0.3 % solution Place 2 drops into the right eye 4 times daily for 10 days 11/28/22 12/8/22  Prince Rashid MD   benzonatate (TESSALON) 200 MG capsule Take 1 capsule by mouth 3 times daily as needed for Cough 11/28/22 12/18/22  Prince Rashid MD   amLODIPine (NORVASC) 10 MG tablet TAKE 1 TABLET EVERY NIGHT 11/7/22   Jignesh Michael MD   rosuvastatin (CRESTOR) 20 MG tablet  7/17/22   Historical Provider, MD   ranolazine (RANEXA) 500 MG extended release tablet Take 1 tablet by mouth 2 times daily 7/8/22   Jignesh Michael MD   indapamide (LOZOL) 1.25 MG tablet Take 1 tablet by mouth 2 times daily 4/29/22   Jignesh Michael MD   furosemide (LASIX) 20 MG tablet  11/29/21   Historical Provider, MD   cloNIDine (CATAPRES) 0.1 MG tablet TAKE 1 TABLET TWICE DAILY 1/4/22   Jignesh Michael MD   metFORMIN (GLUCOPHAGE) 500 MG tablet TAKE 2 TABLETS BY MOUTH 2 TIMES DAILY (WITH MEALS) FOR DIABETES. STOP JANUVIA 1/4/22   Jignesh Michael MD   allopurinol (ZYLOPRIM) 100 MG tablet TAKE 1 TABLET TWICE DAILY 1/4/22   Jignesh Michael MD   vitamin B-12 (CYANOCOBALAMIN) 1000 MCG tablet Take 1 tablet by mouth daily 10/11/21   Lucy Blackmon PA-C   clopidogrel (PLAVIX) 75 MG tablet Take 75 mg by mouth daily    Historical Provider, MD   carvedilol (COREG) 3.125 MG tablet Take 3.125 mg by mouth 2 times daily (with meals)    Historical Provider, MD   blood glucose test strips (CONTOUR NEXT TEST) strip 1 each by In Vitro route daily As needed.  10/31/19   Jignesh Michael MD   aspirin 81 MG tablet Take 81 mg by mouth daily    Historical Provider, MD   Ascorbic Acid (VITAMIN C) 250 MG tablet Take 500 mg by mouth daily     Historical Provider, MD   vitamin E 1000 UNITS capsule Take 400 Units by mouth daily     Historical Provider, MD       Allergies:    Patient has no known allergies. Social History:    The patient currently lives at home with his wife. Tobacco:  Patient reports that he has never smoked. He has never used smokeless tobacco.  Alcohol:   reports no history of alcohol use. Illicit Drugs: denies    Family History:      Problem Relation Age of Onset    No Known Problems Mother     No Known Problems Father        Review of Systems   Constitutional:  Positive for chills, fatigue and fever. Negative for appetite change. HENT:  Positive for sore throat. Negative for congestion, postnasal drip, rhinorrhea and sinus pain. Eyes: Negative. Respiratory:  Positive for cough and shortness of breath. Negative for chest tightness, wheezing and stridor. Cardiovascular:  Positive for leg swelling. Negative for chest pain and palpitations. Gastrointestinal:  Negative for abdominal pain, blood in stool, diarrhea, nausea and vomiting. Endocrine: Negative. Genitourinary: Negative. Musculoskeletal:  Positive for gait problem. Reports \"balance issues\" ongoing for the last several months. Skin: Negative. Neurological:  Negative for dizziness, syncope, light-headedness and headaches. Hematological: Negative. Psychiatric/Behavioral: Negative. Physical Examination:  /79   Pulse 76   Temp 98.9 °F (37.2 °C)   Resp 27   Ht 5' 11\" (1.803 m)   Wt 256 lb (116.1 kg)   SpO2 94%   BMI 35.70 kg/m²     Physical Exam  Constitutional:       General: He is not in acute distress. Appearance: Normal appearance. He is normal weight. He is not ill-appearing. HENT:      Head: Normocephalic and atraumatic. Nose: Nose normal. No congestion. Mouth/Throat:      Mouth: Mucous membranes are moist.      Pharynx: Oropharynx is clear.    Eyes:      Conjunctiva/sclera: Conjunctivae normal.      Pupils: Pupils are equal, round, and reactive to light. Neck:      Vascular: No carotid bruit. Cardiovascular:      Rate and Rhythm: Normal rate. Rhythm irregular. FrequentExtrasystoles are present. Pulses: Normal pulses. Heart sounds: Normal heart sounds. No murmur heard. Pulmonary:      Effort: Pulmonary effort is normal. No tachypnea. Breath sounds: Decreased breath sounds present. No wheezing, rhonchi or rales. Abdominal:      General: Abdomen is flat. Bowel sounds are normal.      Palpations: Abdomen is soft. Musculoskeletal:         General: Normal range of motion. Cervical back: Normal range of motion and neck supple. Right lower le+ Pitting Edema present. Left lower le+ Pitting Edema present. Lymphadenopathy:      Cervical: No cervical adenopathy. Skin:     General: Skin is warm and dry. Capillary Refill: Capillary refill takes less than 2 seconds. Neurological:      General: No focal deficit present. Mental Status: He is alert and oriented to person, place, and time. Mental status is at baseline. Motor: No weakness. Gait: Gait normal.   Psychiatric:         Mood and Affect: Mood normal.         Behavior: Behavior normal.         Thought Content: Thought content normal.         Judgment: Judgment normal.        Diagnostic Data:  CBC:  Recent Labs     22  1250   WBC 10.6   HGB 11.2*   HCT 33.9*        BMP:  Recent Labs     22  1250   *   K 3.9   CL 93*   CO2 25   BUN 23   CREATININE 1.4*   CALCIUM 8.9     Recent Labs     22  1250   AST 83*   ALT 96*   BILITOT 0.6   ALKPHOS 87     Coag Panel: No results for input(s): INR, PROTIME, APTT in the last 72 hours.   Cardiac Enzymes:   Recent Labs     22  1250   TROPONINI <0.01     ABGs:No results found for: PHART, PO2ART, AKE0MDQ  Urinalysis:  Lab Results   Component Value Date/Time    NITRU Negative 2022 02:40 PM    WBCUA 2 2022 02:40 PM    BACTERIA NEGATIVE 2022 02:40 PM    RBCUA 19 12/02/2022 02:40 PM    BLOODU Negative 12/02/2022 02:40 PM    SPECGRAV 1.022 12/02/2022 02:40 PM    GLUCOSEU Negative 12/02/2022 02:40 PM     A1C: No results for input(s): LABA1C in the last 72 hours. ABG:No results for input(s): PHART, ZQO3BAY, PO2ART, ZVC5IFC, BEART, HGBAE, P6CPZSGY, CARBOXHGBART in the last 72 hours. Rad:    XR CHEST PORTABLE    Result Date: 12/2/2022  NO PRIOR REPORT AVAILABLE Exam: X-RAY OF [facility] Clinical data:Shortness of breath. Technique:Single view of the chest. Prior studies: No prior studies submitted. Findings: Low lung volumes, with bibasilar patchy pulmonary opacities, right greater than left. Cardiac silhouette is within normal limits. No acute osseous abnormality is detected. Right-greater-than-left patchy bibasilar pulmonary opacities, in conjunction with low lung volumes, suggesting bibasilar atelectasis with probable superimposed infection. Recommendation: Follow-up chest radiograph in 8-12 weeks to assess resolution.   Electronically Signed by Edmar Green at 02-Dec-2022 03:17:14 PM               Assessment/Plan:    Principal Problem:    Community acquired bacterial pneumonia   -IV abx: Rocephin/Zithromax   -repeat CBC/CMP in AM; trend WBC              - Bronchodilators as needed              - Encourage deep breathing and cough              - Incentive spirometry              - Supplemental oxygen as needed              - Follow blood cultures   -PT/OT eval & treat   -telemetry    Active Problems:    Type 2 diabetes mellitus without complication, without long-term current use of insulin (HCC)   -controlled; last A1C on 9/2/2022 5.7%   -Low dose correctional SSI prn with hypoglycemia protocol   -Accuchecks AC/HS    Essential hypertension   -Monitor    -Restarted home clonidine, coreg, and amlodipine   -Held lasix for now due to Cr 1.4, will restart tomorrow    Hyperlipidemia   -Restarted home crestor    Vitamin B12 deficiency anemia   -Restarted home vit B12    Coronary artery disease of native artery of native heart with stable angina pectoris ([facility] [address]   -Restarted ranexa, plavix, asa      DVT Prophylaxis: Lovenox    Further Orders per Clinical course/attending. Signed:  Electronically signed by Severiano Africa, APRN - CNP on 12/2/22 at 3:55 PM CST       EMR Dragon/Transcription disclaimer:   Much of this encounter note is an electronic transcription/translation of spoken language to printed text.  The electronic translation of spoken language may permit erroneous, or at times, nonsensical words or phrases to be inadvertently transcribed; although attempts have made to review the note for such errors, some may still exist.

## 2022-12-03 VITALS
RESPIRATION RATE: 18 BRPM | TEMPERATURE: 97.9 F | WEIGHT: 256 LBS | DIASTOLIC BLOOD PRESSURE: 60 MMHG | HEIGHT: 71 IN | SYSTOLIC BLOOD PRESSURE: 133 MMHG | BODY MASS INDEX: 35.84 KG/M2 | HEART RATE: 79 BPM | OXYGEN SATURATION: 90 %

## 2022-12-03 LAB
ANION GAP SERPL CALCULATED.3IONS-SCNC: 12 MMOL/L (ref 7–19)
BASOPHILS ABSOLUTE: 0 K/UL (ref 0–0.2)
BASOPHILS RELATIVE PERCENT: 0.2 % (ref 0–1)
BLOOD CULTURE, ROUTINE: NORMAL
BUN BLDV-MCNC: 35 MG/DL (ref 8–23)
CALCIUM SERPL-MCNC: 9 MG/DL (ref 8.8–10.2)
CHLORIDE BLD-SCNC: 95 MMOL/L (ref 98–111)
CO2: 25 MMOL/L (ref 22–29)
CREAT SERPL-MCNC: 1.4 MG/DL (ref 0.5–1.2)
CULTURE, BLOOD 2: NORMAL
EOSINOPHILS ABSOLUTE: 0 K/UL (ref 0–0.6)
EOSINOPHILS RELATIVE PERCENT: 0 % (ref 0–5)
GFR SERPL CREATININE-BSD FRML MDRD: 50 ML/MIN/{1.73_M2}
GLUCOSE BLD-MCNC: 132 MG/DL (ref 70–99)
GLUCOSE BLD-MCNC: 212 MG/DL (ref 74–109)
HCT VFR BLD CALC: 34.8 % (ref 42–52)
HEMOGLOBIN: 11.4 G/DL (ref 14–18)
IMMATURE GRANULOCYTES #: 0.2 K/UL
LYMPHOCYTES ABSOLUTE: 1.2 K/UL (ref 1.1–4.5)
LYMPHOCYTES RELATIVE PERCENT: 10.1 % (ref 20–40)
MCH RBC QN AUTO: 32 PG (ref 27–31)
MCHC RBC AUTO-ENTMCNC: 32.8 G/DL (ref 33–37)
MCV RBC AUTO: 97.8 FL (ref 80–94)
MONOCYTES ABSOLUTE: 0.6 K/UL (ref 0–0.9)
MONOCYTES RELATIVE PERCENT: 4.8 % (ref 0–10)
NEUTROPHILS ABSOLUTE: 9.5 K/UL (ref 1.5–7.5)
NEUTROPHILS RELATIVE PERCENT: 83.1 % (ref 50–65)
PDW BLD-RTO: 13.2 % (ref 11.5–14.5)
PERFORMED ON: ABNORMAL
PLATELET # BLD: 164 K/UL (ref 130–400)
PMV BLD AUTO: 11.4 FL (ref 9.4–12.4)
POTASSIUM REFLEX MAGNESIUM: 4.1 MMOL/L (ref 3.5–5)
RBC # BLD: 3.56 M/UL (ref 4.7–6.1)
SODIUM BLD-SCNC: 132 MMOL/L (ref 136–145)
WBC # BLD: 11.4 K/UL (ref 4.8–10.8)

## 2022-12-03 PROCEDURE — 94761 N-INVAS EAR/PLS OXIMETRY MLT: CPT

## 2022-12-03 PROCEDURE — 80048 BASIC METABOLIC PNL TOTAL CA: CPT

## 2022-12-03 PROCEDURE — 6370000000 HC RX 637 (ALT 250 FOR IP)

## 2022-12-03 PROCEDURE — 82947 ASSAY GLUCOSE BLOOD QUANT: CPT

## 2022-12-03 PROCEDURE — 6360000002 HC RX W HCPCS

## 2022-12-03 PROCEDURE — 94618 PULMONARY STRESS TESTING: CPT

## 2022-12-03 PROCEDURE — 36415 COLL VENOUS BLD VENIPUNCTURE: CPT

## 2022-12-03 PROCEDURE — 2580000003 HC RX 258

## 2022-12-03 PROCEDURE — 85025 COMPLETE CBC W/AUTO DIFF WBC: CPT

## 2022-12-03 RX ORDER — DOXYCYCLINE HYCLATE 100 MG
100 TABLET ORAL 2 TIMES DAILY
Qty: 6 TABLET | Refills: 0 | Status: SHIPPED | OUTPATIENT
Start: 2022-12-03 | End: 2022-12-06

## 2022-12-03 RX ORDER — CEFDINIR 300 MG/1
300 CAPSULE ORAL 2 TIMES DAILY
Qty: 6 CAPSULE | Refills: 0 | Status: SHIPPED | OUTPATIENT
Start: 2022-12-03 | End: 2022-12-06

## 2022-12-03 RX ORDER — FUROSEMIDE 20 MG/1
20 TABLET ORAL DAILY PRN
Qty: 60 TABLET | Refills: 0 | Status: SHIPPED
Start: 2022-12-03 | End: 2022-12-05 | Stop reason: SDUPTHER

## 2022-12-03 RX ADMIN — SODIUM CHLORIDE, PRESERVATIVE FREE 10 ML: 5 INJECTION INTRAVENOUS at 09:19

## 2022-12-03 RX ADMIN — Medication 400 UNITS: at 08:45

## 2022-12-03 RX ADMIN — OXYCODONE HYDROCHLORIDE AND ACETAMINOPHEN 500 MG: 500 TABLET ORAL at 08:45

## 2022-12-03 RX ADMIN — RANOLAZINE 500 MG: 500 TABLET, FILM COATED, EXTENDED RELEASE ORAL at 08:45

## 2022-12-03 RX ADMIN — CLONIDINE HYDROCHLORIDE 0.1 MG: 0.1 TABLET ORAL at 08:45

## 2022-12-03 RX ADMIN — ALLOPURINOL 100 MG: 100 TABLET ORAL at 08:45

## 2022-12-03 RX ADMIN — AZITHROMYCIN MONOHYDRATE 500 MG: 250 TABLET ORAL at 14:26

## 2022-12-03 RX ADMIN — ENOXAPARIN SODIUM 30 MG: 100 INJECTION SUBCUTANEOUS at 08:45

## 2022-12-03 RX ADMIN — CLOPIDOGREL BISULFATE 75 MG: 75 TABLET ORAL at 08:45

## 2022-12-03 RX ADMIN — BENZONATATE 200 MG: 100 CAPSULE ORAL at 04:26

## 2022-12-03 RX ADMIN — CEFTRIAXONE 1000 MG: 1 INJECTION, POWDER, FOR SOLUTION INTRAMUSCULAR; INTRAVENOUS at 14:26

## 2022-12-03 RX ADMIN — CARVEDILOL 3.12 MG: 3.12 TABLET, FILM COATED ORAL at 08:45

## 2022-12-03 RX ADMIN — CIPROFLOXACIN 1 DROP: 3 SOLUTION OPHTHALMIC at 04:27

## 2022-12-03 RX ADMIN — CYANOCOBALAMIN TAB 500 MCG 1000 MCG: 500 TAB at 08:45

## 2022-12-03 RX ADMIN — ASPIRIN 81 MG: 81 TABLET, COATED ORAL at 08:45

## 2022-12-03 RX ADMIN — CIPROFLOXACIN 1 DROP: 3 SOLUTION OPHTHALMIC at 14:26

## 2022-12-03 NOTE — DISCHARGE SUMMARY
[facility] Hospitalists    Discharge Summary      Srikanth Gomes  :  1940  MRN:  [id number]    Admit date:  2022  Discharge date:   12/3/2022    Discharging Physician: Dr. Petra Gabriel Directive: Full Code    Primary Care Physician:  Brandon Soto MD    Discharge Diagnoses:  Principal Problem:    Community acquired bacterial pneumonia  Active Problems:    Type 2 diabetes mellitus without complication, without long-term current use of insulin ([facility]    Essential hypertension    Hyperlipidemia    Vitamin B12 deficiency anemia    Coronary artery disease of native artery of native heart with stable angina pectoris ([facility] [address]  Resolved Problems:    * No resolved hospital problems. *      Portions of this note have been copied forward, however, changed to reflect the most current clinical status of this patient. Hospital Course:     Mr. Ruperto Ferreira is a pleasant 79 y.o. male with past medical history of HTN, CAD,DVT, hyperlipidemia, GERD, and DM II who presented to [facility] ER complaining of shortness of breath and cough. Patient reports that he was diagnosed with COVID one month ago. His only symptoms at that point were a sore throat and fatigue/weakness. He states that he has felt weak since, however he developed a dry cough approximately 2 weeks ago. Patient presented to [facility] ED today with worsening SOB, fever, and chills. Pt's initial O2 sat per EMS was 90% on RA. He was subsequently placed on 2L via NC, given duoneb and 125mg solu-medrol with improvement in sats and symptoms. He has had a dry cough at home, although he does report coughing up green sputum in ED after receiving duoneb treatment. EMS also reports ventricular bigeminy on cardiac monitor. Pt denies CP, light-headedness, dizziness, n/v/d. He does endorse leg swelling which is baseline for him. Pt is a nonsmoker and denies history of COPD. He is not on home oxygen.  Patient reports that he did sleep in the chair last night because it seems to help with his breathing, however he denies SOB or CP today. He is not requiring additional O2 and his sats are 95% on RA. He still reports occasional light-headedness when he changes from lying supine to sitting/standing. Patient is resting comfortably and is in no acute distress. WBC up slightly from yesterday but to be expected. Home O2 evaluation normal. Patient will get one more dose of Rocephin and Azithromycin prior to discharge. Patient is currently in stable condition to be discharged. Significant Diagnostic Studies:   XR CHEST PORTABLE    Result Date: 12/2/2022  NO PRIOR REPORT AVAILABLE Exam: X-RAY OF [facility] Clinical data:Shortness of breath. Technique:Single view of the chest. Prior studies: No prior studies submitted. Findings: Low lung volumes, with bibasilar patchy pulmonary opacities, right greater than left. Cardiac silhouette is within normal limits. No acute osseous abnormality is detected. Right-greater-than-left patchy bibasilar pulmonary opacities, in conjunction with low lung volumes, suggesting bibasilar atelectasis with probable superimposed infection. Recommendation: Follow-up chest radiograph in 8-12 weeks to assess resolution. Electronically Signed by Tremayne Parson at 02-Dec-2022 03:17:14 PM               Pertinent Labs:  CBC:   Recent Labs     12/02/22  1250 12/03/22  0343   WBC 10.6 11.4*   HGB 11.2* 11.4*    164     BMP:    Recent Labs     12/02/22  1250 12/03/22  0343   * 132*   K 3.9 4.1   CL 93* 95*   CO2 25 25   BUN 23 35*   CREATININE 1.4* 1.4*   GLUCOSE 124* 212*     INR: No results for input(s): INR in the last 72 hours. Physical Exam:  Vital Signs:   BP (!) 158/95   Pulse 93   Temp 97.3 °F (36.3 °C)   Resp 20   Ht 5' 11\" (1.803 m)   Wt 256 lb (116.1 kg)   SpO2 94%   BMI 35.70 kg/m²   General appearance:. Alert and Cooperative   HEENT: Normocephalic. Chest: Lung sounds clear and bilaterally without wheezes or rhonchi. Slightly diminished bases. Cardiac:  S1, S2 normal. No murmurs, gallops, or rubs auscultated. Rate controlled. Abdomen: soft, non-tender; non-distended normal bowel sounds no masses, no organomegaly. Extremities: No clubbing or cyanosis. No peripheral edema. Peripheral pulses palpable. Neurologic: Grossly intact. Discharge Medications:          Medication List        START taking these medications      cefdinir 300 MG capsule  Commonly known as: OMNICEF  Take 1 capsule by mouth 2 times daily for 3 days     doxycycline hyclate 100 MG tablet  Commonly known as: VIBRA-TABS  Take 1 tablet by mouth 2 times daily for 3 days            CHANGE how you take these medications      furosemide 20 MG tablet  Commonly known as: LASIX  Take 1 tablet by mouth daily as needed (shortness of breath, leg swelling, or weight gain >3lbs)  What changed:   how much to take  how to take this  when to take this  reasons to take this            CONTINUE taking these medications      albuterol sulfate  (90 Base) MCG/ACT inhaler  Commonly known as: Ventolin HFA  Inhale 2 puffs into the lungs 4 times daily as needed for Wheezing     allopurinol 100 MG tablet  Commonly known as: ZYLOPRIM  TAKE 1 TABLET TWICE DAILY     amLODIPine 10 MG tablet  Commonly known as: NORVASC  TAKE 1 TABLET EVERY NIGHT     aspirin 81 MG tablet     benzonatate 200 MG capsule  Commonly known as: TESSALON  Take 1 capsule by mouth 3 times daily as needed for Cough     blood glucose test strips strip  Commonly known as: Contour Next Test  1 each by In Vitro route daily As needed.      carvedilol 3.125 MG tablet  Commonly known as: COREG     cloNIDine 0.1 MG tablet  Commonly known as: CATAPRES  TAKE 1 TABLET TWICE DAILY     clopidogrel 75 MG tablet  Commonly known as: PLAVIX     indapamide 1.25 MG tablet  Commonly known as: LOZOL  Take 1 tablet by mouth 2 times daily     metFORMIN 500 MG tablet  Commonly known as: GLUCOPHAGE  TAKE 2 TABLETS BY MOUTH 2 TIMES DAILY (WITH MEALS) FOR DIABETES. STOP JANUVIA     ofloxacin 0.3 % solution  Commonly known as: Ocuflox  Place 2 drops into the right eye 4 times daily for 10 days     ranolazine 500 MG extended release tablet  Commonly known as: RANEXA  Take 1 tablet by mouth 2 times daily     rosuvastatin 20 MG tablet  Commonly known as: CRESTOR     vitamin B-12 1000 MCG tablet  Commonly known as: CYANOCOBALAMIN  Take 1 tablet by mouth daily     vitamin C 250 MG tablet     vitamin E 1000 units capsule               Where to Get Your Medications        These medications were sent to [address] [facility], [address] [facility] [address]  [address] [facility], [address]      Phone: [phone]   cefdinir 300 MG capsule  doxycycline hyclate 100 MG tablet       Information about where to get these medications is not yet available    Ask your nurse or doctor about these medications  furosemide 20 MG tablet            Discharge Instructions: Follow up with Raya Marquez MD in 7 days. Take medications as directed. Resume activity as tolerated. Diet: ADULT DIET; Regular     Disposition: Patient is medically stable and will be discharged this afternoon. Time spent on discharge 35 minutes spent in assessing patient, reviewing medications, discussion with nursing, confirming safe discharge plan and preparation of discharge summary. Signed:  Electronically signed by SHELLY Holm CNP on 12/3/22 at 12:46 PM CST         EMR Dragon/Transcription disclaimer:   Much of this encounter note is an electronic transcription/translation of spoken language to printed text.  The electronic translation of spoken language may permit erroneous, or at times, nonsensical words or phrases to be inadvertently transcribed; although attempts have made to review the note for such errors, some may still exist.

## 2022-12-04 RX ORDER — CLONIDINE HYDROCHLORIDE 0.1 MG/1
TABLET ORAL
Qty: 180 TABLET | Refills: 3 | Status: SHIPPED | OUTPATIENT
Start: 2022-12-04

## 2022-12-05 ENCOUNTER — CARE COORDINATION (OUTPATIENT)
Dept: CASE MANAGEMENT | Age: 82
End: 2022-12-05

## 2022-12-05 DIAGNOSIS — J15.9 COMMUNITY ACQUIRED BACTERIAL PNEUMONIA: Primary | ICD-10-CM

## 2022-12-05 LAB
EKG P AXIS: 11 DEGREES
EKG P-R INTERVAL: 222 MS
EKG Q-T INTERVAL: 446 MS
EKG QRS DURATION: 126 MS
EKG QTC CALCULATION (BAZETT): 467 MS
EKG T AXIS: 67 DEGREES

## 2022-12-05 PROCEDURE — 1111F DSCHRG MED/CURRENT MED MERGE: CPT | Performed by: FAMILY MEDICINE

## 2022-12-05 RX ORDER — FUROSEMIDE 20 MG/1
20 TABLET ORAL DAILY PRN
Qty: 60 TABLET | Refills: 1 | Status: SHIPPED | OUTPATIENT
Start: 2022-12-05

## 2022-12-05 NOTE — CARE COORDINATION
[address] Care Transitions Initial Follow Up Call    Call within 2 business days of discharge: Yes    Care Transition Nurse contacted the patient by telephone to perform post hospital discharge assessment. Verified name and  with patient as identifiers. Provided introduction to self, and explanation of the Care Transition Nurse role. Patient: Julio Mckeon Patient : 1940   MRN: [id number]  Reason for Admission: PNA  Discharge Date: 12/3/22 RARS: Readmission Risk Score: 17.2      Last Discharge  Street       Date Complaint Diagnosis Description Type Department Provider    22 Shortness of Breath; Cough Pneumonia of both lower lobes due to infectious organism ED to Hosp-Admission (Discharged) (ADMITTED) [facility] Kaylan Bhardwaj MD; Fanta Calvin, . .. Challenges to be reviewed by the provider   Additional needs identified to be addressed with provider: Yes  Refill on lasix               Method of communication with provider:  wife called office . Spoke with:     Care Transition Nurse reviewed discharge instructions with patient who verbalized understanding. The patient was given an opportunity to ask questions and does not have any further questions or concerns at this time. Were discharge instructions available to patient? Yes. Reviewed appropriate site of care based on symptoms and resources available to patient including: PCP. The patient agrees to contact the PCP office for questions related to their healthcare. Advance Care Planning:   Does patient have an Advance Directive: patient declined education. Medication reconciliation was performed with patient, who verbalizes understanding of administration of home medications.  Medications reviewed, 1111F entered: yes    Was patient discharged with a pulse oximeter? no    Non-face-to-face services provided:  Obtained and reviewed discharge summary and/or continuity of care documents    Offered patient enrollment in the Remote Patient Monitoring (RPM) program for in-home monitoring: NA.    Care Transitions 24 Hour Call    Do you have a copy of your discharge instructions?: Yes  Do you have all of your prescriptions and are they filled?: Yes  Have you been contacted by a [facility] [address]?: No  Have you scheduled your follow up appointment?: Yes  How are you going to get to your appointment?: Car - family or friend to transport  Do you have support at home?: Partner/Spouse/SO  Do you feel like you have everything you need to keep you well at home?: Yes  Are you an active caregiver in your home?: No  Care Transitions Interventions         Follow Up : Spoke with patient today for initial CTN call after discharge from [facility]. He says he is doing ok, has a bad cough, but some better. He has his meds, did review them, 1111F order added. He says his appetite is good, and he is staying hydrated. He has not checked his blood sugar, going to tomorrow he says. HE has some leg edema. CTN suggested checking his weight every day, he has lasix on his med list, none in home, for prn weight gain, leg swelling for SOA. Wife is calling PCP for refill. He sees her for HFU on 12/7. Discussed parameters for weight gain 2-3 lbs in 24-48 hours, or >5 lbs in a week to contact PCP for further instructions on how to treat. Advised to keep feet elevated. He has had the Covid vaccine, hx of Covid a little over a month ago. He has PCHDM listed and did not wish to discuss LW. He is pleasant, a bit [place]. Wife at side during call listening to assist. Discussed CTN calls and f/u with him and he is accepting. CTN will follow up at with him at a later time. Future Appointments   Date Time Provider Jey Carcamo   12/7/2022  8:15 AM SCHEDULE, JEAN-PIERRE TADEO [facility]   12/8/2022  8:15 AM Leighton Patrick MD [facility]   3/7/2023  9:30 AM Leighton Patrick MD [facility]       Care Transition Nurse provided contact information.   Plan for follow-up call in 5-7 days based on severity of symptoms and risk factors.   Plan for next call:  HFU, med changes, respiratory assessment, daily weight, BS    Edmund Warren RN

## 2022-12-07 DIAGNOSIS — N28.9 DECREASED RENAL FUNCTION: ICD-10-CM

## 2022-12-07 LAB
ANION GAP SERPL CALCULATED.3IONS-SCNC: 11 MMOL/L (ref 7–19)
BLOOD CULTURE, ROUTINE: NORMAL
BUN BLDV-MCNC: 17 MG/DL (ref 8–23)
CALCIUM SERPL-MCNC: 9.5 MG/DL (ref 8.8–10.2)
CHLORIDE BLD-SCNC: 97 MMOL/L (ref 98–111)
CO2: 28 MMOL/L (ref 22–29)
CREAT SERPL-MCNC: 1.3 MG/DL (ref 0.5–1.2)
CULTURE, BLOOD 2: NORMAL
GFR SERPL CREATININE-BSD FRML MDRD: 54 ML/MIN/{1.73_M2}
GLUCOSE BLD-MCNC: 129 MG/DL (ref 74–109)
POTASSIUM SERPL-SCNC: 4.1 MMOL/L (ref 3.5–5)
SODIUM BLD-SCNC: 136 MMOL/L (ref 136–145)

## 2022-12-08 ENCOUNTER — OFFICE VISIT (OUTPATIENT)
Dept: PRIMARY CARE CLINIC | Age: 82
End: 2022-12-08
Payer: MEDICARE

## 2022-12-08 ENCOUNTER — ANCILLARY PROCEDURE (OUTPATIENT)
Dept: PRIMARY CARE CLINIC | Age: 82
End: 2022-12-08
Payer: MEDICARE

## 2022-12-08 VITALS
DIASTOLIC BLOOD PRESSURE: 74 MMHG | SYSTOLIC BLOOD PRESSURE: 138 MMHG | HEIGHT: 70 IN | WEIGHT: 243.4 LBS | BODY MASS INDEX: 34.84 KG/M2 | TEMPERATURE: 97 F | HEART RATE: 101 BPM | OXYGEN SATURATION: 95 % | RESPIRATION RATE: 18 BRPM

## 2022-12-08 DIAGNOSIS — R60.9 PERIPHERAL EDEMA: ICD-10-CM

## 2022-12-08 DIAGNOSIS — E11.9 TYPE 2 DIABETES MELLITUS WITHOUT COMPLICATION, WITHOUT LONG-TERM CURRENT USE OF INSULIN (HCC): ICD-10-CM

## 2022-12-08 DIAGNOSIS — R05.3 CHRONIC COUGH: Primary | ICD-10-CM

## 2022-12-08 DIAGNOSIS — N28.9 DECREASED RENAL FUNCTION: ICD-10-CM

## 2022-12-08 PROCEDURE — 3078F DIAST BP <80 MM HG: CPT | Performed by: FAMILY MEDICINE

## 2022-12-08 PROCEDURE — 1036F TOBACCO NON-USER: CPT | Performed by: FAMILY MEDICINE

## 2022-12-08 PROCEDURE — 3044F HG A1C LEVEL LT 7.0%: CPT | Performed by: FAMILY MEDICINE

## 2022-12-08 PROCEDURE — 1123F ACP DISCUSS/DSCN MKR DOCD: CPT | Performed by: FAMILY MEDICINE

## 2022-12-08 PROCEDURE — 71046 X-RAY EXAM CHEST 2 VIEWS: CPT | Performed by: FAMILY MEDICINE

## 2022-12-08 PROCEDURE — G8427 DOCREV CUR MEDS BY ELIG CLIN: HCPCS | Performed by: FAMILY MEDICINE

## 2022-12-08 PROCEDURE — G8417 CALC BMI ABV UP PARAM F/U: HCPCS | Performed by: FAMILY MEDICINE

## 2022-12-08 PROCEDURE — G8483 FLU IMM NO ADMIN DOC REA: HCPCS | Performed by: FAMILY MEDICINE

## 2022-12-08 PROCEDURE — 99214 OFFICE O/P EST MOD 30 MIN: CPT | Performed by: FAMILY MEDICINE

## 2022-12-08 PROCEDURE — 1111F DSCHRG MED/CURRENT MED MERGE: CPT | Performed by: FAMILY MEDICINE

## 2022-12-08 PROCEDURE — 3074F SYST BP LT 130 MM HG: CPT | Performed by: FAMILY MEDICINE

## 2022-12-08 ASSESSMENT — PATIENT HEALTH QUESTIONNAIRE - PHQ9
SUM OF ALL RESPONSES TO PHQ QUESTIONS 1-9: 0
2. FEELING DOWN, DEPRESSED OR HOPELESS: 0
SUM OF ALL RESPONSES TO PHQ QUESTIONS 1-9: 0
SUM OF ALL RESPONSES TO PHQ QUESTIONS 1-9: 0
SUM OF ALL RESPONSES TO PHQ9 QUESTIONS 1 & 2: 0
SUM OF ALL RESPONSES TO PHQ QUESTIONS 1-9: 0
1. LITTLE INTEREST OR PLEASURE IN DOING THINGS: 0

## 2022-12-08 NOTE — PATIENT INSTRUCTIONS
We are committed to providing you with the best care possible. In order to help us achieve these goals please remember to bring all medications, herbal products, and over the counter supplements with you to each visit. If your provider has ordered testing for you, please be sure to follow up with our office if you have not received results within 7 days after the testing took place. *If you receive a survey after visiting one of our offices, please take time to share your experience concerning your physician office visit. These surveys are confidential and no health information about you is shared. We are eager to improve for you and we are counting on your feedback to help make that happen.        Wt Readings from Last 3 Encounters:   12/08/22 243 lb 6.4 oz (110.4 kg)   12/02/22 256 lb (116.1 kg)   11/28/22 256 lb 3.2 oz (116.2 kg)

## 2022-12-09 RX ORDER — ROSUVASTATIN CALCIUM 20 MG/1
20 TABLET, COATED ORAL DAILY
Qty: 10 TABLET | Refills: 0 | Status: SHIPPED | OUTPATIENT
Start: 2022-12-09 | End: 2022-12-19

## 2022-12-09 RX ORDER — ROSUVASTATIN CALCIUM 20 MG/1
20 TABLET, COATED ORAL NIGHTLY
Qty: 30 TABLET | Refills: 0 | Status: SHIPPED | OUTPATIENT
Start: 2022-12-09

## 2022-12-09 NOTE — TELEPHONE ENCOUNTER
Please refill at local pharmacy today; they're closed on the weekends. Thank you. Eliza Chapman MA

## 2022-12-09 NOTE — TELEPHONE ENCOUNTER
[facility] called and states that patients meds were called into mail pharmacy. He is out of Crestor and wont get it in time. Pharmacy wants to know if a 10 day supply can be called in.

## 2022-12-10 ASSESSMENT — ENCOUNTER SYMPTOMS
COUGH: 1
GASTROINTESTINAL NEGATIVE: 1

## 2022-12-10 NOTE — PROGRESS NOTES
SUBJECTIVE:    Anyi Real is 79 y.o.male who comes in complaining of 3 Month Follow-Up   . HPI: This was scheduled at his 3-month visit to recheck his kidney function but since then he has developed COVID. He went to the emergency room at [facility] on 12/2/2022 and was diagnosed with pneumonia. It was felt to be acute community-acquired bacterial pneumonia and he says he was near sepsis. His wife is with him. He is still coughing. He says the cough is severe and he cannot sleep. He still urinating normally. No Known Allergies    Social History     Socioeconomic History    Marital status:     Number of children: 3   Occupational History     Employer: [employer]   Tobacco Use    Smoking status: Never    Smokeless tobacco: Never   Vaping Use    Vaping Use: Never used   Substance and Sexual Activity    Alcohol use: No    Drug use: No     Social Determinants of Health     Financial Resource Strain: Low Risk     Difficulty of Paying Living Expenses: Not hard at all   Food Insecurity: No Food Insecurity    Worried About Running Out of Food in the Last Year: Never true    Ran Out of Food in the Last Year: Never true   Physical Activity: Unknown    Days of Exercise per Week: 0 days       Review of Systems   Constitutional:  Positive for activity change and fatigue. HENT: Negative. Respiratory:  Positive for cough. Cardiovascular: Negative. Gastrointestinal: Negative. Musculoskeletal:  Positive for arthralgias. Neurological: Negative. Hematological: Negative. Psychiatric/Behavioral:  Positive for sleep disturbance.         Current Outpatient Medications on File Prior to Visit   Medication Sig Dispense Refill    furosemide (LASIX) 20 MG tablet Take 1 tablet by mouth daily as needed (shortness of breath, leg swelling, or weight gain >3lbs) 60 tablet 1    cloNIDine (CATAPRES) 0.1 MG tablet TAKE 1 TABLET TWICE DAILY 180 tablet 3    albuterol sulfate HFA (VENTOLIN HFA) 108 (90 Base) MCG/ACT inhaler Inhale 2 puffs into the lungs 4 times daily as needed for Wheezing 18 g 0    amLODIPine (NORVASC) 10 MG tablet TAKE 1 TABLET EVERY NIGHT 90 tablet 3    ranolazine (RANEXA) 500 MG extended release tablet Take 1 tablet by mouth 2 times daily 60 tablet 3    indapamide (LOZOL) 1.25 MG tablet Take 1 tablet by mouth 2 times daily 180 tablet 3    metFORMIN (GLUCOPHAGE) 500 MG tablet TAKE 2 TABLETS BY MOUTH 2 TIMES DAILY (WITH MEALS) FOR DIABETES. STOP JANUVIA 360 tablet 3    allopurinol (ZYLOPRIM) 100 MG tablet TAKE 1 TABLET TWICE DAILY 180 tablet 3    vitamin B-12 (CYANOCOBALAMIN) 1000 MCG tablet Take 1 tablet by mouth daily 90 tablet 1    clopidogrel (PLAVIX) 75 MG tablet Take 75 mg by mouth daily      carvedilol (COREG) 3.125 MG tablet Take 3.125 mg by mouth 2 times daily (with meals)      blood glucose test strips (CONTOUR NEXT TEST) strip 1 each by In Vitro route daily As needed. 100 each 3    aspirin 81 MG tablet Take 81 mg by mouth daily      Ascorbic Acid (VITAMIN C) 250 MG tablet Take 500 mg by mouth daily       vitamin E 1000 UNITS capsule Take 400 Units by mouth daily        No current facility-administered medications on file prior to visit. OBJECTIVE:    Wt Readings from Last 3 Encounters:   12/08/22 243 lb 6.4 oz (110.4 kg)   12/02/22 256 lb (116.1 kg)   11/28/22 256 lb 3.2 oz (116.2 kg)       /74   Pulse (!) 101   Temp 97 °F (36.1 °C)   Resp 18   Ht 5' 10\" (1.778 m)   Wt 243 lb 6.4 oz (110.4 kg)   SpO2 95%   BMI 34.92 kg/m²     Physical Exam  Vitals reviewed. Constitutional:       General: He is not in acute distress. Appearance: Normal appearance. He is well-developed. He is not ill-appearing. HENT:      Head: Normocephalic. Right Ear: Tympanic membrane, ear canal and external ear normal.      Left Ear: Tympanic membrane, ear canal and external ear normal.      Nose: Nose normal. No rhinorrhea.       Mouth/Throat:      Mouth: Mucous membranes are moist. Pharynx: No posterior oropharyngeal erythema. Eyes:      Extraocular Movements: Extraocular movements intact. Conjunctiva/sclera: Conjunctivae normal.      Pupils: Pupils are equal, round, and reactive to light. Cardiovascular:      Rate and Rhythm: Normal rate and regular rhythm. Pulses: Normal pulses. Heart sounds: Normal heart sounds. Pulmonary:      Effort: Pulmonary effort is normal.      Breath sounds: Normal breath sounds. Musculoskeletal:         General: Normal range of motion. Cervical back: Normal range of motion and neck supple. Right lower leg: Edema present. Left lower leg: Edema present. Comments: 1+ edema in both lower legs   Lymphadenopathy:      Cervical: No cervical adenopathy. Skin:     General: Skin is warm and dry. Neurological:      General: No focal deficit present. Mental Status: He is alert and oriented to person, place, and time. Psychiatric:         Mood and Affect: Mood normal.         Behavior: Behavior normal.         Thought Content: Thought content normal.         Judgment: Judgment normal.       ASSESSMENT:    1. Chronic cough    2. Type 2 diabetes mellitus without complication, without long-term current use of insulin ([address]    3. Decreased renal function    4. Peripheral edema          PLAN:  1. Chronic cough  -     XR CHEST (2 VW)  2. Type 2 diabetes mellitus without complication, without long-term current use of insulin ([address]  3. Decreased renal function  4. Peripheral edema     Because of his chronic cough we are going to get a chest x-ray. He is already using Phenergan DM. It is not improving and he has a history of pneumonia after COVID. Type 2 diabetes is stable. Continue metformin 500 mg 2 tablets by mouth twice a day before meal.  I did review his labs and his most recent hemoglobin A1c on September 6, 2022 was excellent at 5.7. He does have a history of decreased renal function.   This is improving but not yet normal. (Completely controlled) on 12/7/2022 chloride was 97 with a random glucose of 129 and a creatinine of 1.3 and GFR 54. This is a little better than it was previously but still not normal.    He is also complaining of some swelling in his feet. This is a chronic problem that is a little worse right now. We talked about elevating his feet for at least an hour after lunch and wearing compression knee-high's. His wife said they will get some. I do not think that he needs a diuretic. Blood pressure was good at 138/74. We will contact him when we get results of his chest x-ray. If it is not improving we will consider further medications. Follow-up:  Return in about 3 months (around 3/8/2023) for Recheck kidney function. PATIENT INSTRUCTIONS:  Patient Instructions   We are committed to providing you with the best care possible. In order to help us achieve these goals please remember to bring all medications, herbal products, and over the counter supplements with you to each visit. If your provider has ordered testing for you, please be sure to follow up with our office if you have not received results within 7 days after the testing took place. *If you receive a survey after visiting one of our offices, please take time to share your experience concerning your physician office visit. These surveys are confidential and no health information about you is shared. We are eager to improve for you and we are counting on your feedback to help make that happen. Wt Readings from Last 3 Encounters:   12/08/22 243 lb 6.4 oz (110.4 kg)   12/02/22 256 lb (116.1 kg)   11/28/22 256 lb 3.2 oz (116.2 kg)              EMR Dragon/transcription disclaimer:  Much of this encounter note is electronic transcription/translation of spoken language to printed texts. The electronic translation of spoken language may be erroneous, or at times, nonsensical words or phrases may beinadvertently transcribed. Although I have reviewed the note for such errors, some may still exist.

## 2022-12-13 ENCOUNTER — CARE COORDINATION (OUTPATIENT)
Dept: CASE MANAGEMENT | Age: 82
End: 2022-12-13

## 2022-12-13 NOTE — CARE COORDINATION
[facility] Care Transitions Follow Up Call    Care Transition Nurse contacted the patient by telephone to follow up. Verified name and  with patient as identifiers. Patient: Ankita Cortez  Patient : 1940   MRN: [id number]  Reason for Admission:   Discharge Date: 12/3/22 RARS: Readmission Risk Score: 17.2      Needs to be reviewed by the provider   Additional needs identified to be addressed with provider: No  none             Method of communication with provider: none. Spoke with: Ankita Cortez        Follow Up : Spoke with patient today for follow up call. HE says he is doing much better today. Says cough is better and he is breathing better. He has gotten an incentive spirometer and using it, and says he can tell a difference in how he feels. CTN advised using every few hours and doing several repetitions and that will help in getting better oxygen exchange in his lungs. He said his appetite has improved, he is now hungry. Says he get some rest. His Xray read that his PNA is improving and he has seen his PCP for HFU. He seems to be convalescing well at this point. CTN will follow up at a later time. Future Appointments   Date Time Provider Jey Carcamo   3/6/2023  8:00 AM SCHEDULE, [facility]   3/7/2023  9:30 AM Chantell Gant MD [facility]          Care Transitions Subsequent and Final Call    Subsequent and Final Calls  Care Transitions Interventions  Other Interventions:             Care Transition Nurse provided contact information for future needs. Plan for follow-up call in 7-10 days based on severity of symptoms and risk factors.   Plan for next call:  respiratory status, use of incentive spirometer,     Jonah Tapia RN

## 2022-12-19 ENCOUNTER — TELEPHONE (OUTPATIENT)
Dept: PRIMARY CARE CLINIC | Age: 82
End: 2022-12-19

## 2022-12-19 ENCOUNTER — OFFICE VISIT (OUTPATIENT)
Dept: PRIMARY CARE CLINIC | Age: 82
End: 2022-12-19
Payer: MEDICARE

## 2022-12-19 ENCOUNTER — ANCILLARY PROCEDURE (OUTPATIENT)
Dept: PRIMARY CARE CLINIC | Age: 82
End: 2022-12-19
Payer: MEDICARE

## 2022-12-19 VITALS
DIASTOLIC BLOOD PRESSURE: 80 MMHG | SYSTOLIC BLOOD PRESSURE: 138 MMHG | HEART RATE: 74 BPM | TEMPERATURE: 97.9 F | WEIGHT: 242.2 LBS | BODY MASS INDEX: 33.91 KG/M2 | OXYGEN SATURATION: 95 % | HEIGHT: 71 IN

## 2022-12-19 DIAGNOSIS — R53.82 CHRONIC FATIGUE: Primary | ICD-10-CM

## 2022-12-19 DIAGNOSIS — E87.1 HYPONATREMIA: ICD-10-CM

## 2022-12-19 DIAGNOSIS — I50.22 CHRONIC SYSTOLIC HEART FAILURE (HCC): ICD-10-CM

## 2022-12-19 DIAGNOSIS — R06.02 SHORTNESS OF BREATH: ICD-10-CM

## 2022-12-19 DIAGNOSIS — N28.9 DECREASED RENAL FUNCTION: ICD-10-CM

## 2022-12-19 DIAGNOSIS — R06.02 SHORTNESS OF BREATH: Primary | ICD-10-CM

## 2022-12-19 DIAGNOSIS — Z86.16 HISTORY OF COVID-19: ICD-10-CM

## 2022-12-19 LAB
ANION GAP SERPL CALCULATED.3IONS-SCNC: 11 MMOL/L (ref 7–19)
BUN BLDV-MCNC: 27 MG/DL (ref 8–23)
CALCIUM SERPL-MCNC: 9.2 MG/DL (ref 8.8–10.2)
CHLORIDE BLD-SCNC: 93 MMOL/L (ref 98–111)
CO2: 27 MMOL/L (ref 22–29)
CREAT SERPL-MCNC: 1.7 MG/DL (ref 0.5–1.2)
GFR SERPL CREATININE-BSD FRML MDRD: 39 ML/MIN/{1.73_M2}
GLUCOSE BLD-MCNC: 137 MG/DL (ref 74–109)
HCT VFR BLD CALC: 38.3 % (ref 42–52)
HEMOGLOBIN: 13 G/DL (ref 14–18)
MCH RBC QN AUTO: 33.2 PG (ref 27–31)
MCHC RBC AUTO-ENTMCNC: 33.9 G/DL (ref 33–37)
MCV RBC AUTO: 97.7 FL (ref 80–94)
PDW BLD-RTO: 13.9 % (ref 11.5–14.5)
PLATELET # BLD: 164 K/UL (ref 130–400)
PMV BLD AUTO: 11.9 FL (ref 9.4–12.4)
POTASSIUM SERPL-SCNC: 4.2 MMOL/L (ref 3.5–5)
PRO-BNP: 198 PG/ML (ref 0–1800)
RBC # BLD: 3.92 M/UL (ref 4.7–6.1)
SODIUM BLD-SCNC: 131 MMOL/L (ref 136–145)
WBC # BLD: 5.5 K/UL (ref 4.8–10.8)

## 2022-12-19 PROCEDURE — G8483 FLU IMM NO ADMIN DOC REA: HCPCS | Performed by: FAMILY MEDICINE

## 2022-12-19 PROCEDURE — 3074F SYST BP LT 130 MM HG: CPT | Performed by: FAMILY MEDICINE

## 2022-12-19 PROCEDURE — 1111F DSCHRG MED/CURRENT MED MERGE: CPT | Performed by: FAMILY MEDICINE

## 2022-12-19 PROCEDURE — 3078F DIAST BP <80 MM HG: CPT | Performed by: FAMILY MEDICINE

## 2022-12-19 PROCEDURE — G8427 DOCREV CUR MEDS BY ELIG CLIN: HCPCS | Performed by: FAMILY MEDICINE

## 2022-12-19 PROCEDURE — 1036F TOBACCO NON-USER: CPT | Performed by: FAMILY MEDICINE

## 2022-12-19 PROCEDURE — G8417 CALC BMI ABV UP PARAM F/U: HCPCS | Performed by: FAMILY MEDICINE

## 2022-12-19 PROCEDURE — 71046 X-RAY EXAM CHEST 2 VIEWS: CPT | Performed by: FAMILY MEDICINE

## 2022-12-19 PROCEDURE — 1123F ACP DISCUSS/DSCN MKR DOCD: CPT | Performed by: FAMILY MEDICINE

## 2022-12-19 PROCEDURE — 99213 OFFICE O/P EST LOW 20 MIN: CPT | Performed by: FAMILY MEDICINE

## 2022-12-19 ASSESSMENT — ENCOUNTER SYMPTOMS: SHORTNESS OF BREATH: 1

## 2022-12-19 NOTE — PROGRESS NOTES
SUBJECTIVE:    Rudy Lewis is 79 y.o.male who comes in complaining of Cough (Not feeling well since he was in the hosp. In hosp 12-2-22 for two days had pneum in both lungs )   . HPI: Rudy Lewis was hospitalized at the beginning of December for pneumonia in both lower lobes which was felt to be community-acquired bacterial pneumonia. COVID was negative in December but he says he had it in November. He is still short of breath. His feet swell a little and he does not think they go down overnight. He denies any fever chills nausea or vomiting. He really does not have a cough. No Known Allergies    Social History     Socioeconomic History    Marital status:      Spouse name: None    Number of children: 3    Years of education: None    Highest education level: None   Occupational History     Employer: [employer]   Tobacco Use    Smoking status: Never    Smokeless tobacco: Never   Vaping Use    Vaping Use: Never used   Substance and Sexual Activity    Alcohol use: No    Drug use: No     Social Determinants of Health     Financial Resource Strain: Low Risk     Difficulty of Paying Living Expenses: Not hard at all   Food Insecurity: No Food Insecurity    Worried About Running Out of Food in the Last Year: Never true    Ran Out of Food in the Last Year: Never true   Physical Activity: Unknown    Days of Exercise per Week: 0 days       Review of Systems   Constitutional:  Positive for activity change and fatigue. HENT: Negative. Respiratory:  Positive for shortness of breath. Cardiovascular:  Positive for leg swelling. Negative for chest pain and palpitations. Musculoskeletal:  Positive for arthralgias. Neurological:  Negative for weakness. Hematological: Negative. Psychiatric/Behavioral: Negative.          Current Outpatient Medications on File Prior to Visit   Medication Sig Dispense Refill    rosuvastatin (CRESTOR) 20 MG tablet Take 1 tablet by mouth daily for 10 days 10 tablet 0    furosemide (LASIX) 20 MG tablet Take 1 tablet by mouth daily as needed (shortness of breath, leg swelling, or weight gain >3lbs) 60 tablet 1    cloNIDine (CATAPRES) 0.1 MG tablet TAKE 1 TABLET TWICE DAILY 180 tablet 3    albuterol sulfate HFA (VENTOLIN HFA) 108 (90 Base) MCG/ACT inhaler Inhale 2 puffs into the lungs 4 times daily as needed for Wheezing 18 g 0    amLODIPine (NORVASC) 10 MG tablet TAKE 1 TABLET EVERY NIGHT 90 tablet 3    ranolazine (RANEXA) 500 MG extended release tablet Take 1 tablet by mouth 2 times daily 60 tablet 3    indapamide (LOZOL) 1.25 MG tablet Take 1 tablet by mouth 2 times daily 180 tablet 3    metFORMIN (GLUCOPHAGE) 500 MG tablet TAKE 2 TABLETS BY MOUTH 2 TIMES DAILY (WITH MEALS) FOR DIABETES. STOP JANUVIA 360 tablet 3    allopurinol (ZYLOPRIM) 100 MG tablet TAKE 1 TABLET TWICE DAILY 180 tablet 3    vitamin B-12 (CYANOCOBALAMIN) 1000 MCG tablet Take 1 tablet by mouth daily 90 tablet 1    clopidogrel (PLAVIX) 75 MG tablet Take 75 mg by mouth daily      carvedilol (COREG) 3.125 MG tablet Take 3.125 mg by mouth 2 times daily (with meals)      blood glucose test strips (CONTOUR NEXT TEST) strip 1 each by In Vitro route daily As needed. 100 each 3    aspirin 81 MG tablet Take 81 mg by mouth daily      Ascorbic Acid (VITAMIN C) 250 MG tablet Take 500 mg by mouth daily       vitamin E 1000 UNITS capsule Take 400 Units by mouth daily        No current facility-administered medications on file prior to visit. OBJECTIVE:    Wt Readings from Last 3 Encounters:   12/19/22 242 lb 3.2 oz (109.9 kg)   12/08/22 243 lb 6.4 oz (110.4 kg)   12/02/22 256 lb (116.1 kg)       /80   Pulse 74   Temp 97.9 °F (36.6 °C)   Ht 5' 11\" (1.803 m)   Wt 242 lb 3.2 oz (109.9 kg)   SpO2 95%   BMI 33.78 kg/m²     Physical Exam  Vitals and nursing note reviewed. Constitutional:       General: He is not in acute distress. Appearance: Normal appearance. He is well-developed. He is obese.    HENT: Head: Normocephalic. Cardiovascular:      Rate and Rhythm: Normal rate and regular rhythm. Heart sounds: Normal heart sounds. Pulmonary:      Effort: Pulmonary effort is normal.      Breath sounds: Normal breath sounds. Musculoskeletal:         General: No swelling. Right lower leg: Edema present. Left lower leg: Edema present. Comments: Trace edema in lower extremities   Skin:     General: Skin is warm and dry. Neurological:      Mental Status: He is alert and oriented to person, place, and time. Psychiatric:         Mood and Affect: Mood normal.         Behavior: Behavior normal.         Thought Content: Thought content normal.         Judgment: Judgment normal.       ASSESSMENT:    1. Chronic fatigue    2. History of COVID-19    3. Shortness of breath    4. Chronic systolic heart failure (HCC)          PLAN:  1. Chronic fatigue  -     Brain Natriuretic Peptide  -     Basic Metabolic Panel  -     CBC  2. History of COVID-19  3. Shortness of breath  -     Brain Natriuretic Peptide  -     Basic Metabolic Panel  -     CBC  4. Chronic systolic heart failure (HCC)  -     Brain Natriuretic Peptide   For chest x-ray was put in but since he was so tired I wanted to go ahead and see him. He is not orthostatic. He is in no distress. We will check labs above. I am looking for anemia, worsening heart failure or an electrolyte imbalance. We will contact him as soon as we get results of his blood work and his chest x-ray. The chest pneumonia was bacterial not related to COVID. Follow-up:  Return if symptoms worsen or fail to improve. PATIENT INSTRUCTIONS:  Patient Instructions   We are committed to providing you with the best care possible. In order to help us achieve these goals please remember to bring all medications, herbal products, and over the counter supplements with you to each visit.      If your provider has ordered testing for you, please be sure to follow up with our office if you have not received results within 7 days after the testing took place. *If you receive a survey after visiting one of our offices, please take time to share your experience concerning your physician office visit. These surveys are confidential and no health information about you is shared. We are eager to improve for you and we are counting on your feedback to help make that happen. EMR Dragon/transcription disclaimer:  Much of this encounter note is electronic transcription/translation of spoken language to printed texts. The electronic translation of spoken language may be erroneous, or at times, nonsensical words or phrases may beinadvertently transcribed.   Although I have reviewed the note for such errors, some may still exist.

## 2022-12-19 NOTE — TELEPHONE ENCOUNTER
Pt states he has had Covid, Pneumonia Does not feel good. Cant walk. Asking for chest x ray. Wants to come in.

## 2022-12-22 ENCOUNTER — CARE COORDINATION (OUTPATIENT)
Dept: CASE MANAGEMENT | Age: 82
End: 2022-12-22

## 2022-12-22 NOTE — CARE COORDINATION
[facility] Care Transitions Follow Up Call    Spoke with: N/A    Patient: Hallie Brown  Patient : 1940   MRN: [id number]  Reason for Admission:   Discharge Date: 12/3/22 RARS: Readmission Risk Score: 17.2      Follow Up : Attempted to make contact with Kerrie Haywood for CT f/u call without success. Unable to leave a message regarding intent of call and call back information. Will try again at a later time.     Future Appointments   Date Time Provider Jey Carcamo   3/6/2023  8:00 AM SCHEDULE, [facility]   3/7/2023  9:30 AM Myra Viramontes MD [facility]          Care Transitions Subsequent and Final Call    Subsequent and Final Calls  Care Transitions Interventions  Other Interventions:                 Alan Flynn RN

## 2022-12-24 ENCOUNTER — APPOINTMENT (OUTPATIENT)
Dept: CT IMAGING | Age: 82
End: 2022-12-24
Payer: MEDICARE

## 2022-12-24 ENCOUNTER — APPOINTMENT (OUTPATIENT)
Dept: GENERAL RADIOLOGY | Age: 82
End: 2022-12-24
Payer: MEDICARE

## 2022-12-24 ENCOUNTER — HOSPITAL ENCOUNTER (EMERGENCY)
Age: 82
Discharge: HOME | End: 2022-12-24
Attending: EMERGENCY MEDICINE
Payer: MEDICARE

## 2022-12-24 VITALS
SYSTOLIC BLOOD PRESSURE: 124 MMHG | DIASTOLIC BLOOD PRESSURE: 64 MMHG | HEIGHT: 71 IN | HEART RATE: 71 BPM | OXYGEN SATURATION: 92 % | TEMPERATURE: 98.4 F | BODY MASS INDEX: 33.88 KG/M2 | WEIGHT: 242 LBS | RESPIRATION RATE: 18 BRPM

## 2022-12-24 DIAGNOSIS — R06.00 DYSPNEA, UNSPECIFIED TYPE: Primary | ICD-10-CM

## 2022-12-24 DIAGNOSIS — N18.9 ACUTE KIDNEY INJURY SUPERIMPOSED ON CKD (HCC): ICD-10-CM

## 2022-12-24 DIAGNOSIS — E87.1 HYPONATREMIA: ICD-10-CM

## 2022-12-24 DIAGNOSIS — N17.9 ACUTE KIDNEY INJURY SUPERIMPOSED ON CKD (HCC): ICD-10-CM

## 2022-12-24 LAB
ALBUMIN SERPL-MCNC: 4.2 G/DL (ref 3.5–5.2)
ALP BLD-CCNC: 84 U/L (ref 40–130)
ALT SERPL-CCNC: 45 U/L (ref 5–41)
ANION GAP SERPL CALCULATED.3IONS-SCNC: 12 MMOL/L (ref 7–19)
AST SERPL-CCNC: 27 U/L (ref 5–40)
BASOPHILS ABSOLUTE: 0 K/UL (ref 0–0.2)
BASOPHILS RELATIVE PERCENT: 0.3 % (ref 0–1)
BILIRUB SERPL-MCNC: 0.5 MG/DL (ref 0.2–1.2)
BUN BLDV-MCNC: 35 MG/DL (ref 8–23)
CALCIUM SERPL-MCNC: 9.7 MG/DL (ref 8.8–10.2)
CHLORIDE BLD-SCNC: 90 MMOL/L (ref 98–111)
CO2: 24 MMOL/L (ref 22–29)
CREAT SERPL-MCNC: 1.9 MG/DL (ref 0.5–1.2)
EOSINOPHILS ABSOLUTE: 0.2 K/UL (ref 0–0.6)
EOSINOPHILS RELATIVE PERCENT: 2.6 % (ref 0–5)
GFR SERPL CREATININE-BSD FRML MDRD: 34 ML/MIN/{1.73_M2}
GLUCOSE BLD-MCNC: 143 MG/DL (ref 74–109)
HCT VFR BLD CALC: 40.2 % (ref 42–52)
HEMOGLOBIN: 13.7 G/DL (ref 14–18)
IMMATURE GRANULOCYTES #: 0 K/UL
LYMPHOCYTES ABSOLUTE: 1.5 K/UL (ref 1.1–4.5)
LYMPHOCYTES RELATIVE PERCENT: 22 % (ref 20–40)
MCH RBC QN AUTO: 33 PG (ref 27–31)
MCHC RBC AUTO-ENTMCNC: 34.1 G/DL (ref 33–37)
MCV RBC AUTO: 96.9 FL (ref 80–94)
MONOCYTES ABSOLUTE: 0.4 K/UL (ref 0–0.9)
MONOCYTES RELATIVE PERCENT: 5.9 % (ref 0–10)
NEUTROPHILS ABSOLUTE: 4.6 K/UL (ref 1.5–7.5)
NEUTROPHILS RELATIVE PERCENT: 68.6 % (ref 50–65)
PDW BLD-RTO: 13.8 % (ref 11.5–14.5)
PLATELET # BLD: 129 K/UL (ref 130–400)
PMV BLD AUTO: 12 FL (ref 9.4–12.4)
POTASSIUM SERPL-SCNC: 4.3 MMOL/L (ref 3.5–5)
PRO-BNP: 657 PG/ML (ref 0–1800)
RBC # BLD: 4.15 M/UL (ref 4.7–6.1)
SARS-COV-2, NAAT: DETECTED
SODIUM BLD-SCNC: 126 MMOL/L (ref 136–145)
TOTAL PROTEIN: 7.3 G/DL (ref 6.6–8.7)
TROPONIN: 0.02 NG/ML (ref 0–0.03)
TROPONIN: 0.03 NG/ML (ref 0–0.03)
WBC # BLD: 6.6 K/UL (ref 4.8–10.8)

## 2022-12-24 PROCEDURE — 6360000004 HC RX CONTRAST MEDICATION: Performed by: EMERGENCY MEDICINE

## 2022-12-24 PROCEDURE — 36415 COLL VENOUS BLD VENIPUNCTURE: CPT

## 2022-12-24 PROCEDURE — 71275 CT ANGIOGRAPHY CHEST: CPT

## 2022-12-24 PROCEDURE — 87635 SARS-COV-2 COVID-19 AMP PRB: CPT

## 2022-12-24 PROCEDURE — 80053 COMPREHEN METABOLIC PANEL: CPT

## 2022-12-24 PROCEDURE — 93005 ELECTROCARDIOGRAM TRACING: CPT | Performed by: EMERGENCY MEDICINE

## 2022-12-24 PROCEDURE — 85025 COMPLETE CBC W/AUTO DIFF WBC: CPT

## 2022-12-24 PROCEDURE — 84484 ASSAY OF TROPONIN QUANT: CPT

## 2022-12-24 PROCEDURE — 2580000003 HC RX 258: Performed by: EMERGENCY MEDICINE

## 2022-12-24 PROCEDURE — 83880 ASSAY OF NATRIURETIC PEPTIDE: CPT

## 2022-12-24 PROCEDURE — 99285 EMERGENCY DEPT VISIT HI MDM: CPT

## 2022-12-24 PROCEDURE — 71045 X-RAY EXAM CHEST 1 VIEW: CPT

## 2022-12-24 RX ORDER — 0.9 % SODIUM CHLORIDE 0.9 %
1000 INTRAVENOUS SOLUTION INTRAVENOUS ONCE
Status: COMPLETED | OUTPATIENT
Start: 2022-12-24 | End: 2022-12-24

## 2022-12-24 RX ADMIN — IOPAMIDOL 70 ML: 755 INJECTION, SOLUTION INTRAVENOUS at 05:49

## 2022-12-24 RX ADMIN — SODIUM CHLORIDE 1000 ML: 9 INJECTION, SOLUTION INTRAVENOUS at 05:50

## 2022-12-24 ASSESSMENT — ENCOUNTER SYMPTOMS
NAUSEA: 0
RHINORRHEA: 0
SHORTNESS OF BREATH: 1
BACK PAIN: 0
VOMITING: 0
ABDOMINAL PAIN: 0
COUGH: 0
DIARRHEA: 0
SORE THROAT: 0

## 2022-12-24 ASSESSMENT — PAIN - FUNCTIONAL ASSESSMENT: PAIN_FUNCTIONAL_ASSESSMENT: NONE - DENIES PAIN

## 2022-12-24 NOTE — ED PROVIDER NOTES
[facility] EMERGENCY DEPT  eMERGENCY dEPARTMENT eNCOUnter      Pt Name: Vinny Bledsoe  MRN: [id number]  [place] 1940  Date of evaluation: 12/24/2022  Provider: Silverio Garay MD    [address] [place] [address]       Chief Complaint   Patient presents with    Shortness of Breath     Woke short of breath this morning. \"Could not catch my breath. \" Had double pneumonia last month. HISTORY OF PRESENT ILLNESS   (Location/Symptom, Timing/Onset,Context/Setting, Quality, Duration, Modifying Factors, Severity)  Note limiting factors. Vinny Bledsoe is a 79 y.o. male who presents to the emergency department for shortness of breath. The patient states he became acutely short of breath tonight while sitting and occasionally sleeping in a chair. However sleeping in a chair is not uncommon for him he tells me. No chest pain. Had pneumonia earlier this month and was treated but had xray on 12/19 showing RLL pneumonia had resolved. Had covid beginning of November. Denies any fevers or chills. Overall feels his breathing has improved since being here. Has some chronic LE edema but this is unchanged. No hx of PE but has had a DVT before. HPI    NursingNotes were reviewed. REVIEW OF SYSTEMS    (2-9 systems for level 4, 10 or more for level 5)     Review of Systems   Constitutional:  Negative for chills and fever. HENT:  Negative for rhinorrhea and sore throat. Respiratory:  Positive for shortness of breath. Negative for cough. Cardiovascular:  Negative for chest pain. Gastrointestinal:  Negative for abdominal pain, diarrhea, nausea and vomiting. Genitourinary:  Negative for dysuria and frequency. Musculoskeletal:  Negative for back pain and neck pain. Neurological:  Negative for dizziness and headaches. All other systems reviewed and are negative.          PAST MEDICALHISTORY     Past Medical History:   Diagnosis Date    Acid reflux     COVID     GERD (gastroesophageal reflux disease)     Hx of blood clots left leg    Hypertension          SURGICAL HISTORY       Past Surgical History:   Procedure Laterality Date    CARDIAC CATHETERIZATION      CARDIOVASCULAR STRESS TEST  07/01/2015    CAROTID ENDARTERECTOMY Left 06/28/2016    CAROTID ENDARTERECTOMY WITH GORETEX ACUSEAL PATCH ANGIOPLASTY performed by Juan Sanchez MD at [address] [place] [address]      () blood clot    VASCULAR SURGERY  7/14/2015 [facility]    Eversion right carotid endarterectomy          CURRENT MEDICATIONS     Previous Medications    ALBUTEROL SULFATE HFA (VENTOLIN HFA) 108 (90 BASE) MCG/ACT INHALER    Inhale 2 puffs into the lungs 4 times daily as needed for Wheezing    ALLOPURINOL (ZYLOPRIM) 100 MG TABLET    TAKE 1 TABLET TWICE DAILY    AMLODIPINE (NORVASC) 10 MG TABLET    TAKE 1 TABLET EVERY NIGHT    ASCORBIC ACID (VITAMIN C) 250 MG TABLET    Take 500 mg by mouth daily     ASPIRIN 81 MG TABLET    Take 81 mg by mouth daily    BLOOD GLUCOSE TEST STRIPS (CONTOUR NEXT TEST) STRIP    1 each by In Vitro route daily As needed. CARVEDILOL (COREG) 3.125 MG TABLET    Take 3.125 mg by mouth 2 times daily (with meals)    CLONIDINE (CATAPRES) 0.1 MG TABLET    TAKE 1 TABLET TWICE DAILY    CLOPIDOGREL (PLAVIX) 75 MG TABLET    Take 75 mg by mouth daily    FUROSEMIDE (LASIX) 20 MG TABLET    Take 1 tablet by mouth daily as needed (shortness of breath, leg swelling, or weight gain >3lbs)    INDAPAMIDE (LOZOL) 1.25 MG TABLET    Take 1 tablet by mouth 2 times daily    METFORMIN (GLUCOPHAGE) 500 MG TABLET    TAKE 2 TABLETS BY MOUTH 2 TIMES DAILY (WITH MEALS) FOR DIABETES.  STOP JANUVIA    RANOLAZINE (RANEXA) 500 MG EXTENDED RELEASE TABLET    Take 1 tablet by mouth 2 times daily    ROSUVASTATIN (CRESTOR) 20 MG TABLET    Take 1 tablet by mouth daily for 10 days    VITAMIN B-12 (CYANOCOBALAMIN) 1000 MCG TABLET    Take 1 tablet by mouth daily    VITAMIN E 1000 UNITS CAPSULE    Take 400 Units by mouth daily        ALLERGIES Patient has no known allergies. FAMILY HISTORY       Family History   Problem Relation Age of Onset    No Known Problems Mother     No Known Problems Father           SOCIAL HISTORY       Social History     Socioeconomic History    Marital status:      Spouse name: None    Number of children: 3    Years of education: None    Highest education level: None   Occupational History     Employer: [employer]   Tobacco Use    Smoking status: Never    Smokeless tobacco: Never   Vaping Use    Vaping Use: Never used   Substance and Sexual Activity    Alcohol use: No    Drug use: No     Social Determinants of Health     Financial Resource Strain: Low Risk     Difficulty of Paying Living Expenses: Not hard at all   Food Insecurity: No Food Insecurity    Worried About [facility] in the Last Year: Never true    Ran Out of Food in the Last Year: Never true   Physical Activity: Unknown    Days of Exercise per Week: 0 days       SCREENINGS    [place] Coma Scale  Eye Opening: Spontaneous  Best Verbal Response: Oriented  Best Motor Response: Obeys commands  [place] Coma Scale Score: 15        PHYSICAL EXAM    (up to 7 for level 4, 8 or more for level 5)     ED Triage Vitals   BP Temp Temp Source Heart Rate Resp SpO2 Height Weight   12/24/22 0459 12/24/22 0459 12/24/22 0459 12/24/22 0459 12/24/22 0459 12/24/22 0459 12/24/22 0456 12/24/22 0456   130/74 98.2 °F (36.8 °C) Oral 80 30 92 % 5' 11\" (1.803 m) 242 lb (109.8 kg)       Physical Exam  Vitals and nursing note reviewed. Constitutional:       General: He is not in acute distress. Appearance: He is well-developed. He is not ill-appearing or diaphoretic. Comments: Speaking in full sentences   HENT:      Head: Normocephalic and atraumatic. Nose: Nose normal.      Mouth/Throat:      Mouth: Mucous membranes are moist.   Eyes:      Conjunctiva/sclera: Conjunctivae normal.   Neck:      Trachea: No tracheal deviation.    Cardiovascular:      Rate and Rhythm: Normal rate and regular rhythm. Heart sounds: Normal heart sounds. No murmur heard. Pulmonary:      Effort: Pulmonary effort is normal. Tachypnea present. No respiratory distress. Breath sounds: Normal breath sounds. No wheezing or rales. Abdominal:      General: Abdomen is flat. Palpations: Abdomen is soft. Tenderness: There is no abdominal tenderness. Musculoskeletal:         General: Normal range of motion. Cervical back: Normal range of motion and neck supple. Right lower leg: Edema present. Left lower leg: Edema present. Comments: Trace BLE no erythema   Skin:     General: Skin is warm and dry. Neurological:      Mental Status: He is alert and oriented to person, place, and time.        DIAGNOSTIC RESULTS     EKG: All EKG's areinterpreted by the Emergency Department Physician who either signs or Co-signs this chart in the absence of a cardiologist.    74 NSR occasional PVC no obvious st changes non diagnostic ekg    RADIOLOGY:  Non-plain film images such as CT, Ultrasound and MRI are read by the radiologist. Plain radiographic images are visualized and preliminarily interpreted bythe emergency physician with the below findings:      CTA PULMONARY W CONTRAST   Final Result      XR CHEST PORTABLE    (Results Pending)           LABS:  Labs Reviewed   COVID-19, RAPID - Abnormal; Notable for the following components:       Result Value    SARS-CoV-2, NAAT DETECTED (*)     All other components within normal limits    Narrative:     Rima Diaz tel. ,  Microbiology results called to and read back by Eugenio Riley, [address] [place] [address] in ED,  12/24/2022 05:25, by [facility]   CBC WITH AUTO DIFFERENTIAL - Abnormal; Notable for the following components:    RBC 4.15 (*)     Hemoglobin 13.7 (*)     Hematocrit 40.2 (*)     MCV 96.9 (*)     MCH 33.0 (*)     Platelets 071 (*)     Neutrophils % 68.6 (*)     All other components within normal limits   COMPREHENSIVE METABOLIC PANEL - Abnormal; Notable for the following components:    Sodium 126 (*)     Chloride 90 (*)     Glucose 143 (*)     BUN 35 (*)     Creatinine 1.9 (*)     Est, Glom Filt Rate 34 (*)     ALT 45 (*)     All other components within normal limits   BRAIN NATRIURETIC PEPTIDE   TROPONIN   TROPONIN       All other labs were within normal range or not returned as of this dictation. EMERGENCY DEPARTMENT COURSE and DIFFERENTIAL DIAGNOSIS/MDM:   Vitals:    Vitals:    12/24/22 0456 12/24/22 0459 12/24/22 0530   BP:  130/74 113/64   Pulse:  80 68   Resp:  30 22   Temp:  98.2 °F (36.8 °C)    TempSrc:  Oral    SpO2:  92% 91%   Weight: 242 lb (109.8 kg)     Height: 5' 11\" (1.803 m)         MDM     Amount and/or Complexity of Data Reviewed  Clinical lab tests: ordered and reviewed  Tests in the radiology section of CPT®: ordered and reviewed  Independent visualization of images, tracings, or specimens: yes        Pt presents after feeling of sob tonight but now tells me feeling much better and cont to feel well through out his ED stay so far. Notable labs na 126 trending down overall this month on comparison labs, diya on ckd baseline around 1.4 today 1.9, pt getting IV fluids here. Trop 0.03 but denies any cp. Repeat 0700. Of note has one prior cardiac stent placed 2 yrs ago by Dr. Saranya Alcantar. Cxr clear so given hx of recent covid and infection and hx of DVT did check CTA which is neg for PE or obvious acute findings. Still testing + but infection was in November. Read noted about GB but no abd pain and lft wnl and no leukocytosis and no abd pain. Pt is hopeful to go home. Repeat trop at 0700. Have d/w Dr. Mathis See        CONSULTS:  None    PROCEDURES:  Unless otherwise noted below, none     Procedures    FINAL IMPRESSION      1. Dyspnea, unspecified type    2. Acute kidney injury superimposed on CKD ([facility] [address]    3. Hyponatremia          DISPOSITION/PLAN   DISPOSITION        PATIENT REFERRED TO:  No follow-up provider specified.     DISCHARGE MEDICATIONS:  New Prescriptions    No medications on file          (Please note that portions of this note were completed with a voice recognition program.  Efforts were made to edit thedictations but occasionally words are mis-transcribed.)    Selam Wells MD (electronically signed)  Attending Emergency Physician          Kalina Jesus MD  12/24/22 9628       Kalina Jesus MD  12/24/22 4326

## 2022-12-24 NOTE — ED PROVIDER NOTES
[facility] EMERGENCY DEPT  eMERGENCYdEPARTMENT eNCOUnter      Pt Name: Vinny Bledsoe  MRN: [id number]  [place] 1940  Date of evaluation: 12/24/2022  Levi Watkins MD    Emergency Department care of this patient was assumed at 0700 from Dr. Lexii Campoverde. We have discussed the case and the plan of care. I have seen and evaluated patient and reviewed ED course. Patient currently awaiting repeat cardiac enzyme. CHIEF COMPLAINT       Chief Complaint   Patient presents with    Shortness of Breath     Woke short of breath this morning. \"Could not catch my breath. \" Had double pneumonia last month. PHYSICAL EXAM    (up to 7 for level 4, 8 or more for level 5)     ED Triage Vitals   BP Temp Temp Source Heart Rate Resp SpO2 Height Weight   12/24/22 0459 12/24/22 0459 12/24/22 0459 12/24/22 0459 12/24/22 0459 12/24/22 0459 12/24/22 0456 12/24/22 0456   130/74 98.2 °F (36.8 °C) Oral 80 30 92 % 5' 11\" (1.803 m) 242 lb (109.8 kg)       Physical Exam    Alert, speaking. He is sitting up in bed comfortable at this time. His vital signs are stable. He is in no acute respiratory distress. DIAGNOSTIC RESULTS       RADIOLOGY:   Non-plain film imagessuch as CT, Ultrasound and MRI are read by the radiologist. Plain radiographic images are visualized and preliminarily interpreted by the emergency physician with the below findings:        CTA PULMONARY W CONTRAST   Final Result      XR CHEST PORTABLE   Final Result   Normal chest x-ray. Electronically signed by Bonita Alcantar M.D. on 12-24-22 at 8923              LABS:  Labs Reviewed   COVID-19, RAPID - Abnormal; Notable for the following components:       Result Value    SARS-CoV-2, NAAT DETECTED (*)     All other components within normal limits    Narrative:     Jacob Hartman tel. ,  Microbiology results called to and read back by Mariposa Burch, [address] [place] [address] in ED,  12/24/2022 05:25, by [facility]   CBC WITH AUTO DIFFERENTIAL - Abnormal; Notable for the following components: RBC 4.15 (*)     Hemoglobin 13.7 (*)     Hematocrit 40.2 (*)     MCV 96.9 (*)     MCH 33.0 (*)     Platelets 316 (*)     Neutrophils % 68.6 (*)     All other components within normal limits   COMPREHENSIVE METABOLIC PANEL - Abnormal; Notable for the following components:    Sodium 126 (*)     Chloride 90 (*)     Glucose 143 (*)     BUN 35 (*)     Creatinine 1.9 (*)     Est, Glom Filt Rate 34 (*)     ALT 45 (*)     All other components within normal limits   BRAIN NATRIURETIC PEPTIDE   TROPONIN   TROPONIN       All other labs were within normal range or not returned as of this dictation. EMERGENCY DEPARTMENT COURSE and DIFFERENTIAL DIAGNOSIS/MDM:   Vitals:    Vitals:    12/24/22 0459 12/24/22 0530 12/24/22 0700 12/24/22 0730   BP: 130/74 113/64  128/66   Pulse: 80 68 (P) 72 74   Resp: 30 22 (P) 20 22   Temp: 98.2 °F (36.8 °C)      TempSrc: Oral      SpO2: 92% 91%  95%   Weight:       Height:             MDM    Repeat cardiac biomarkers unremarkable. Patient will be following up with his PMD for repeat laboratory studies next week. PROCEDURES:  Unless otherwise noted below, none     Procedures    FINAL IMPRESSION      1. Dyspnea, unspecified type    2. Acute kidney injury superimposed on CKD ([facility] [address]    3. Hyponatremia          DISPOSITION/PLAN   DISPOSITION     PATIENT REFERRED TO:  Gayle Gómez MD  [address] [id number] [phone]    Call on 12/26/2022  need repeat sodium and kidney function next week.           (Please note that portions ofthis note were completed with a voice recognition program.  Efforts were made to edit the dictations but occasionally words are mis-transcribed.)    Chloe Pierson MD(electronically signed)  Attending Emergency Physician          Chloe Pierson MD  12/24/22 [id number]

## 2022-12-27 LAB
EKG P AXIS: 33 DEGREES
EKG P-R INTERVAL: 250 MS
EKG Q-T INTERVAL: 442 MS
EKG QRS DURATION: 128 MS
EKG QTC CALCULATION (BAZETT): 473 MS
EKG T AXIS: 90 DEGREES

## 2022-12-27 PROCEDURE — 93010 ELECTROCARDIOGRAM REPORT: CPT | Performed by: INTERNAL MEDICINE

## 2022-12-28 ENCOUNTER — OFFICE VISIT (OUTPATIENT)
Dept: PRIMARY CARE CLINIC | Age: 82
End: 2022-12-28
Payer: MEDICARE

## 2022-12-28 VITALS
BODY MASS INDEX: 33.75 KG/M2 | OXYGEN SATURATION: 96 % | SYSTOLIC BLOOD PRESSURE: 124 MMHG | HEIGHT: 71 IN | DIASTOLIC BLOOD PRESSURE: 80 MMHG | HEART RATE: 42 BPM | TEMPERATURE: 97.1 F

## 2022-12-28 DIAGNOSIS — F41.9 ANXIETY: ICD-10-CM

## 2022-12-28 DIAGNOSIS — D69.6 THROMBOCYTOPENIA (HCC): ICD-10-CM

## 2022-12-28 DIAGNOSIS — D69.6 THROMBOCYTOPENIA (HCC): Primary | ICD-10-CM

## 2022-12-28 DIAGNOSIS — R06.02 SHORTNESS OF BREATH: Primary | ICD-10-CM

## 2022-12-28 DIAGNOSIS — N28.9 RENAL INSUFFICIENCY: ICD-10-CM

## 2022-12-28 DIAGNOSIS — E87.1 HYPONATREMIA: ICD-10-CM

## 2022-12-28 LAB
ALBUMIN SERPL-MCNC: 4.2 G/DL (ref 3.5–5.2)
ALP BLD-CCNC: 69 U/L (ref 40–130)
ALT SERPL-CCNC: 35 U/L (ref 5–41)
ANION GAP SERPL CALCULATED.3IONS-SCNC: 11 MMOL/L (ref 7–19)
AST SERPL-CCNC: 22 U/L (ref 5–40)
BILIRUB SERPL-MCNC: 0.5 MG/DL (ref 0.2–1.2)
BUN BLDV-MCNC: 27 MG/DL (ref 8–23)
CALCIUM SERPL-MCNC: 9.2 MG/DL (ref 8.8–10.2)
CHLORIDE BLD-SCNC: 89 MMOL/L (ref 98–111)
CO2: 22 MMOL/L (ref 22–29)
CREAT SERPL-MCNC: 1.4 MG/DL (ref 0.5–1.2)
GFR SERPL CREATININE-BSD FRML MDRD: 50 ML/MIN/{1.73_M2}
GLUCOSE BLD-MCNC: 135 MG/DL (ref 74–109)
HCT VFR BLD CALC: 37.6 % (ref 42–52)
HEMOGLOBIN: 13 G/DL (ref 14–18)
MCH RBC QN AUTO: 32.8 PG (ref 27–31)
MCHC RBC AUTO-ENTMCNC: 34.6 G/DL (ref 33–37)
MCV RBC AUTO: 94.9 FL (ref 80–94)
PDW BLD-RTO: 13.8 % (ref 11.5–14.5)
PLATELET # BLD: 120 K/UL (ref 130–400)
PMV BLD AUTO: 12.1 FL (ref 9.4–12.4)
POTASSIUM SERPL-SCNC: 4.3 MMOL/L (ref 3.5–5)
RBC # BLD: 3.96 M/UL (ref 4.7–6.1)
SODIUM BLD-SCNC: 122 MMOL/L (ref 136–145)
TOTAL PROTEIN: 6.9 G/DL (ref 6.6–8.7)
WBC # BLD: 6.1 K/UL (ref 4.8–10.8)

## 2022-12-28 PROCEDURE — 99213 OFFICE O/P EST LOW 20 MIN: CPT | Performed by: FAMILY MEDICINE

## 2022-12-28 PROCEDURE — 1036F TOBACCO NON-USER: CPT | Performed by: FAMILY MEDICINE

## 2022-12-28 PROCEDURE — 3078F DIAST BP <80 MM HG: CPT | Performed by: FAMILY MEDICINE

## 2022-12-28 PROCEDURE — G8427 DOCREV CUR MEDS BY ELIG CLIN: HCPCS | Performed by: FAMILY MEDICINE

## 2022-12-28 PROCEDURE — 3074F SYST BP LT 130 MM HG: CPT | Performed by: FAMILY MEDICINE

## 2022-12-28 PROCEDURE — 1123F ACP DISCUSS/DSCN MKR DOCD: CPT | Performed by: FAMILY MEDICINE

## 2022-12-28 PROCEDURE — 1111F DSCHRG MED/CURRENT MED MERGE: CPT | Performed by: FAMILY MEDICINE

## 2022-12-28 PROCEDURE — G8417 CALC BMI ABV UP PARAM F/U: HCPCS | Performed by: FAMILY MEDICINE

## 2022-12-28 PROCEDURE — G8483 FLU IMM NO ADMIN DOC REA: HCPCS | Performed by: FAMILY MEDICINE

## 2022-12-28 RX ORDER — LORAZEPAM 0.5 MG/1
TABLET ORAL
Qty: 12 TABLET | Refills: 0 | Status: SHIPPED | OUTPATIENT
Start: 2022-12-28 | End: 2023-01-07

## 2022-12-28 ASSESSMENT — PATIENT HEALTH QUESTIONNAIRE - PHQ9
SUM OF ALL RESPONSES TO PHQ9 QUESTIONS 1 & 2: 0
SUM OF ALL RESPONSES TO PHQ QUESTIONS 1-9: 0
2. FEELING DOWN, DEPRESSED OR HOPELESS: 0
1. LITTLE INTEREST OR PLEASURE IN DOING THINGS: 0
SUM OF ALL RESPONSES TO PHQ QUESTIONS 1-9: 0

## 2022-12-29 ENCOUNTER — CARE COORDINATION (OUTPATIENT)
Dept: CASE MANAGEMENT | Age: 82
End: 2022-12-29

## 2022-12-29 DIAGNOSIS — E87.1 HYPONATREMIA: Primary | ICD-10-CM

## 2022-12-29 NOTE — CARE COORDINATION
[facility] Care Transitions Follow Up Call    Care Transition Nurse contacted the patient by telephone to follow up. Verified name and  with patient as identifiers. Patient: Rosy Parker  Patient : 1940   MRN: [id number]  Reason for Admission:   Discharge Date: 22 RARS: Readmission Risk Score: 17.2      Needs to be reviewed by the provider   Additional needs identified to be addressed with provider: No  none             Method of communication with provider: none. Spoke with: Rosy Parker      Follow Up : Spoke with patient today for follow up call. He has been to ED, felt SOA. Says he thinks it was a bit of anxiety, as he has gotten better and his breathing is better. He says the medicine Dr. Carmen Tay gave him has helped. He says he does get around the house, not as mobile as he use to be, but says he does not believe he needs cane or walker. CTN advised him that if he thought he did to contact PCP office and they could order him one. He states understanding. No problems or complaints. CTN will end outreach at this time.    Future Appointments   Date Time Provider Jey Carcamo   3/6/2023  8:00 AM SCHEDULE, [facility] [place]   3/7/2023  9:30 AM Jonny Umanzor MD [facility]        Care Transitions Subsequent and Final Call    Subsequent and Final Calls  Do you have any ongoing symptoms?: No  Have your medications changed?: No  Do you have any questions related to your medications?: No  Do you currently have any active services?: No  Do you have any needs or concerns that I can assist you with?: No  Identified Barriers: None  Care Transitions Interventions  Other Interventions:                 Franca Turner RN

## 2023-01-02 ASSESSMENT — ENCOUNTER SYMPTOMS
SHORTNESS OF BREATH: 1
COUGH: 1

## 2023-01-02 NOTE — PROGRESS NOTES
SUBJECTIVE:    Padilla Lagunas is 79 y.o.male who comes in complaining of Post-COVID Symptoms (Patient diagnosed with Covid this month at the hospital.  Patient c/o not being able to sleep. Patient can sleep better in the chair at night. Also very fatigued.  )   . HPI: Vanesa Mathew comes in today for follow-up. He is still positive for COVID. He says he is not able to sleep. He is sleeping in a chair at night. He feels exhausted. He was in the emergency room on Christmas eve and was told to follow-up with me. He says he is not getting worse. He just does not feel a lot better. Work-up for heart problems was negative. On the 24th white count was normal.  Hemoglobin was 13.7 but platelets were low at 129,000. He has had a history of thrombocytopenia in the past.  He denies any excessive bleeding from urine, lungs or gums. No Known Allergies    Social History     Socioeconomic History    Marital status:      Spouse name: None    Number of children: 3    Years of education: None    Highest education level: None   Occupational History     Employer: [employer]   Tobacco Use    Smoking status: Never    Smokeless tobacco: Never   Vaping Use    Vaping Use: Never used   Substance and Sexual Activity    Alcohol use: No    Drug use: No     Social Determinants of Health     Financial Resource Strain: Low Risk     Difficulty of Paying Living Expenses: Not hard at all   Food Insecurity: No Food Insecurity    Worried About Running Out of Food in the Last Year: Never true    Ran Out of Food in the Last Year: Never true   Physical Activity: Unknown    Days of Exercise per Week: 0 days       Review of Systems   Constitutional:  Positive for activity change and fatigue. HENT:  Positive for congestion. Respiratory:  Positive for cough and shortness of breath. Cardiovascular: Negative. Neurological: Negative. Hematological: Negative. Psychiatric/Behavioral:  Positive for sleep disturbance. Current Outpatient Medications on File Prior to Visit   Medication Sig Dispense Refill    rosuvastatin (CRESTOR) 20 MG tablet Take 1 tablet by mouth daily for 10 days 10 tablet 0    cloNIDine (CATAPRES) 0.1 MG tablet TAKE 1 TABLET TWICE DAILY 180 tablet 3    amLODIPine (NORVASC) 10 MG tablet TAKE 1 TABLET EVERY NIGHT 90 tablet 3    ranolazine (RANEXA) 500 MG extended release tablet Take 1 tablet by mouth 2 times daily 60 tablet 3    indapamide (LOZOL) 1.25 MG tablet Take 1 tablet by mouth 2 times daily 180 tablet 3    metFORMIN (GLUCOPHAGE) 500 MG tablet TAKE 2 TABLETS BY MOUTH 2 TIMES DAILY (WITH MEALS) FOR DIABETES. STOP JANUVIA 360 tablet 3    allopurinol (ZYLOPRIM) 100 MG tablet TAKE 1 TABLET TWICE DAILY 180 tablet 3    vitamin B-12 (CYANOCOBALAMIN) 1000 MCG tablet Take 1 tablet by mouth daily 90 tablet 1    clopidogrel (PLAVIX) 75 MG tablet Take 75 mg by mouth daily      carvedilol (COREG) 3.125 MG tablet Take 3.125 mg by mouth 2 times daily (with meals)      blood glucose test strips (CONTOUR NEXT TEST) strip 1 each by In Vitro route daily As needed. 100 each 3    aspirin 81 MG tablet Take 81 mg by mouth daily      Ascorbic Acid (VITAMIN C) 250 MG tablet Take 500 mg by mouth daily       vitamin E 1000 UNITS capsule Take 400 Units by mouth daily        No current facility-administered medications on file prior to visit. OBJECTIVE:    Wt Readings from Last 3 Encounters:   12/24/22 242 lb (109.8 kg)   12/19/22 242 lb 3.2 oz (109.9 kg)   12/08/22 243 lb 6.4 oz (110.4 kg)       /80   Pulse (!) 42   Temp 97.1 °F (36.2 °C)   Ht 5' 11\" (1.803 m)   SpO2 96%   BMI 33.75 kg/m²     Physical Exam  Vitals and nursing note reviewed. Constitutional:       General: He is not in acute distress. Appearance: Normal appearance. He is well-developed. He is not ill-appearing. HENT:      Head: Normocephalic.       Right Ear: Tympanic membrane, ear canal and external ear normal.      Left Ear: Tympanic membrane, ear canal and external ear normal.      Nose: Nose normal. No rhinorrhea. Mouth/Throat:      Mouth: Mucous membranes are moist.      Pharynx: No posterior oropharyngeal erythema. Eyes:      Extraocular Movements: Extraocular movements intact. Conjunctiva/sclera: Conjunctivae normal.      Pupils: Pupils are equal, round, and reactive to light. Cardiovascular:      Rate and Rhythm: Normal rate and regular rhythm. Pulses: Normal pulses. Heart sounds: Normal heart sounds. Pulmonary:      Effort: Pulmonary effort is normal.      Breath sounds: Normal breath sounds. Musculoskeletal:      Cervical back: Normal range of motion and neck supple. Lymphadenopathy:      Cervical: No cervical adenopathy. Skin:     General: Skin is warm and dry. Neurological:      General: No focal deficit present. Mental Status: He is alert and oriented to person, place, and time. Psychiatric:         Mood and Affect: Mood normal.         Behavior: Behavior normal.         Thought Content: Thought content normal.         Judgment: Judgment normal.       ASSESSMENT:    1. Shortness of breath    2. Anxiety    3. Thrombocytopenia ([address]    4. Renal insufficiency          PLAN:  1. Shortness of breath  2. Anxiety  -     LORazepam (ATIVAN) 0.5 MG tablet; 1/2 to 1 tablet by mouth at bedtime as needed for anxiety and sleep, Disp-12 tablet, R-0Normal  3. Thrombocytopenia (HCC)  -     CBC  4. Renal insufficiency  -     Comprehensive Metabolic Panel     Recheck a CBC because of his decreased platelets. This is of uncertain status. I feel that a lot of his trouble sleeping is anxiety. Were going to use low-dose lorazepam short-term to see if this will help him rest.  Because he feels short of breath even though his labs were good he becomes anxious and cannot sleep. I explained that this is a short-term medication only and he is to take 1/2 to 1 tablet but no more.     His shortness of breath is due to his COVID. Renal insufficiency is of uncertain status. On the 24th GFR was down to 34 with a creatinine of 1.9 and BUN of 35 so we are going to repeat that today. He is trying to drink more water so hopefully it will be better. Follow-up:  Return if symptoms worsen or fail to improve. PATIENT INSTRUCTIONS:  Patient Instructions   We are committed to providing you with the best care possible. In order to help us achieve these goals please remember to bring all medications, herbal products, and over the counter supplements with you to each visit. If your provider has ordered testing for you, please be sure to follow up with our office if you have not received results within 7 days after the testing took place. *If you receive a survey after visiting one of our offices, please take time to share your experience concerning your physician office visit. These surveys are confidential and no health information about you is shared. We are eager to improve for you and we are counting on your feedback to help make that happen. EMR Dragon/transcription disclaimer:  Much of this encounter note is electronic transcription/translation of spoken language to printed texts. The electronic translation of spoken language may be erroneous, or at times, nonsensical words or phrases may beinadvertently transcribed.   Although I have reviewed the note for such errors, some may still exist.

## 2023-01-05 ENCOUNTER — NURSE ONLY (OUTPATIENT)
Dept: PRIMARY CARE CLINIC | Age: 83
End: 2023-01-05

## 2023-01-05 DIAGNOSIS — E87.1 HYPONATREMIA: ICD-10-CM

## 2023-01-05 DIAGNOSIS — D69.6 THROMBOCYTOPENIA (HCC): ICD-10-CM

## 2023-01-05 DIAGNOSIS — N28.9 DECREASED RENAL FUNCTION: ICD-10-CM

## 2023-01-05 LAB
ANION GAP SERPL CALCULATED.3IONS-SCNC: 14 MMOL/L (ref 7–19)
BUN BLDV-MCNC: 20 MG/DL (ref 8–23)
CALCIUM SERPL-MCNC: 9.7 MG/DL (ref 8.8–10.2)
CHLORIDE BLD-SCNC: 93 MMOL/L (ref 98–111)
CO2: 25 MMOL/L (ref 22–29)
CREAT SERPL-MCNC: 1.3 MG/DL (ref 0.5–1.2)
GFR SERPL CREATININE-BSD FRML MDRD: 54 ML/MIN/{1.73_M2}
GLUCOSE BLD-MCNC: 106 MG/DL (ref 74–109)
HCT VFR BLD CALC: 38.4 % (ref 42–52)
HEMOGLOBIN: 13.3 G/DL (ref 14–18)
MCH RBC QN AUTO: 33.3 PG (ref 27–31)
MCHC RBC AUTO-ENTMCNC: 34.6 G/DL (ref 33–37)
MCV RBC AUTO: 96 FL (ref 80–94)
PDW BLD-RTO: 13.8 % (ref 11.5–14.5)
PLATELET # BLD: 112 K/UL (ref 130–400)
PMV BLD AUTO: 11.8 FL (ref 9.4–12.4)
POTASSIUM SERPL-SCNC: 4.5 MMOL/L (ref 3.5–5)
RBC # BLD: 4 M/UL (ref 4.7–6.1)
SODIUM BLD-SCNC: 132 MMOL/L (ref 136–145)
WBC # BLD: 6.6 K/UL (ref 4.8–10.8)

## 2023-01-06 ENCOUNTER — TELEPHONE (OUTPATIENT)
Dept: PRIMARY CARE CLINIC | Age: 83
End: 2023-01-06

## 2023-01-06 NOTE — TELEPHONE ENCOUNTER
Pt's daughter saw pt's results on Mychart and was very confused about the results saying he was seeing oncology. She states he is not and has never been. I let her know a referral was made in June of 2021 and there is a note from their office in June of 2022 that pt refused an appt with them due to the wait time to get in and the cost of scans done. She is asking if he needs to see them and if so, can a referral be put in.

## 2023-01-07 DIAGNOSIS — F41.9 ANXIETY: ICD-10-CM

## 2023-01-09 ENCOUNTER — TELEPHONE (OUTPATIENT)
Dept: PRIMARY CARE CLINIC | Age: 83
End: 2023-01-09

## 2023-01-09 ENCOUNTER — TELEPHONE (OUTPATIENT)
Dept: HEMATOLOGY | Age: 83
End: 2023-01-09

## 2023-01-09 RX ORDER — LORAZEPAM 0.5 MG/1
TABLET ORAL
Qty: 12 TABLET | Refills: 0 | OUTPATIENT
Start: 2023-01-09 | End: 2023-01-17

## 2023-01-09 NOTE — TELEPHONE ENCOUNTER
Patient was told by Dr. Sonya Calixto that they needed to be seen with our office, was a patient of Betty Cole last year, but the patient does not want to be seen by Betty Cole again. Dr. Lisbet Morin has agreed to take his case.

## 2023-01-09 NOTE — TELEPHONE ENCOUNTER
Provider needs to review PDMP    PDMP Monitoring:    Last PDMP Fab[place] as Reviewed ([place]):  Review User Review Instant Review Result          Urine Drug Screenings (1 yr)    No resulted procedures found.       Medication Contract and Consent for Opioid Use Documents Filed      No documents found

## 2023-01-09 NOTE — TELEPHONE ENCOUNTER
Pt Daughter states Pt needs a Referral to Hematology / Oncology. Pt has seen Dr. Jerri Pino in the past but does not want to see him again. Asking for someone else. Also states Lorazepam is not working. Pt gets very anxious at night. Pt gets anxious about not be able to breath.

## 2023-01-09 NOTE — TELEPHONE ENCOUNTER
Please tell patient that if we go up on this we take the chance that he will stop breathing. If he cannot breathe at night because of anxiety and this is not working he needs to see the lung doctor. We do not want to sedate him so much that he stops breathing. All of this started with COVID so I think he needs to see the lung doctor.

## 2023-01-18 ENCOUNTER — TELEPHONE (OUTPATIENT)
Dept: CARDIOLOGY | Facility: CLINIC | Age: 83
End: 2023-01-18
Payer: MEDICARE

## 2023-01-18 NOTE — TELEPHONE ENCOUNTER
"Patient's daughter is asking if her dad should still be taking plavix and coreg: \"He's on a lot of medications and is wanting to stop some.\"  He meets with Dr Russo on Monday and would like your input before they discuss this with her. Please advise. Eliza Chapman MA    "

## 2023-01-19 NOTE — PROGRESS NOTES
Progress Note      Pt Name: Nubia Amin  YOB: 1940  MRN: [id number]    Date of evaluation: 1/23/2023  History Obtained From:  patient, spouse -Coco Baird, electronic medical record    CHIEF COMPLAINT:    Chief Complaint   Patient presents with    Follow-up     Low platelets       Mr. Nubia Amin is a pleasant 79-year-old  gentleman who is followed with primary and secondary diagnoses as outlined:  Chronic thrombocytopenia (at least as far back as 2013)  Macrocytosis with mild anemia and thrombocytopenia  B12 deficiency  Kappa light chain MGUS    Mr. Pinky Tello has previously been evaluated in this office by Ray Harp PA-C and now at the request of his PCP and his wife and the patient, desires to have follow-up in my office for continued evaluation and recommendations. HISTORY OF PRESENT ILLNESS:    Nubia Amin [facility]) is a 79 y.o.  male who was seen initially in the office by Ray Harp PA-C on 6/24/2021 and evaluation of mild thrombocytopenia. At his initial visit, he was also found to have macrocytosis and a slightly elevated IgM level and a kappa/lambda (K/L) light chain ratio. He was being monitored for possible trending serology. Arvind denied night sweats, weight loss, pruritus, or extreme fatigue. At presentation in June 2021, he was still on a daily basis. Serology on 6/24/2021 documented that Gurpreet Guan was  B12 deficient. Parenteral B12 shots were given weekly, then monthly and and after his September 2021 injection,  was placed on oral B12 replacement of 1000 mcg daily. Gurpreet Guan took both [employer] as well as the booster. He has contracted COVID-19 on 12/24/2022. He has mild bruising on his arms but denies any progressive bruising, bleeding. His high blood pressure has been controlled well on amlodipine, clonidine, Coreg   He is also on on metformin.     Gurpreet Guan has peripheral vascular disease with carotid endarterectomies. Rufina Ken also has coronary artery disease without any new exacerbations. Sagar Méndez had a coronary stent placed on 5/24/2021 by Dr. Eliezer Edward. He is on Plavix 75 mg and aspirin 81 daily. Over the last several weeks he has been experiencing issues of dizziness and difficulty with balance. This is apparently being addressed by his PCP Dr.Terri Bills. HEMATOLOGY HISTORY: Thrombocytopenia, B12 deficiency, gammopathy  Sagar Méndez was seen in initial hematology consultation on 6/24/2021 referred by Dr. Nico Jacobs for evaluation of thrombocytopenia. Sagar Méndez had a coronary stent placed on 5/24/2021 by Dr. Eliezer Edward. He is on Plavix 75 mg and aspirin 81 daily. He reports that he has known about mild thrombocytopenia going on for some time. According to medical records in epic, mild thrombocytopenia has been present since 2013. He does not have any known history of liver dysfunction. He has never been irregular alcohol user. He may have drank a beer occasionally in his youth. CMP 6/2/2021 revealed slight elevation of ALT of 43 otherwise LFTs WNL. The initial CBC in the office on 6/24/2021 revealed a WBC 6.85 with normal percent differential.  Hgb 13.4 with .5, ,000. Piedad Son, JOSE discussed the fact that he has chronic mild thrombocytopenia and reports having bruising issues. He also pointed out that his MCV is slightly elevated. However, he explained to Gerry Grajeda and his wife Juventino Ruby that his platelets are not dangerously low by any means.       Serology 6/24/2021  B12 - 260  MMA - 375 (0-378)  Folate - 18.5  Copper - 83  Zinc - 118 ()  Antiplatelet ab - Negative  Hepatitis A, B, C panel - Negative  HIV - Non reactive  NAS - Negative  SPEP - No M spike with immunofixation unclear  QI -IgG 1161, IgA 173, IgM 220 ()  Free serum light chains - kappa 54.6 with K/L ratio 2.08  CMP - crt 1.3 with GFR 53, LFTs WNL    Arvind was found to be B12 deficient. Parenteral B12 shots were given weekly, then monthly and and after his September 20 21 injection was placed on oral B12 replacement of 1000 mcg daily. Past Medical History:   Diagnosis Date    Acid reflux     COVID     GERD (gastroesophageal reflux disease)     Hx of blood clots     left leg    Hypertension         Past Surgical History:   Procedure Laterality Date    CARDIAC CATHETERIZATION      CARDIOVASCULAR STRESS TEST  07/01/2015    CAROTID ENDARTERECTOMY Left 06/28/2016    CAROTID ENDARTERECTOMY WITH GORETEX ACUSEAL PATCH ANGIOPLASTY performed by Meenu Nesbitt MD at [address] [place] [address]      () blood clot    VASCULAR SURGERY  7/14/2015 [facility]    Eversion right carotid endarterectomy            Current Outpatient Medications:     doxepin (SINEQUAN) 25 MG capsule, Take 1 capsule by mouth nightly For sleep and anxiety, Disp: 30 capsule, Rfl: 3    sertraline (ZOLOFT) 50 MG tablet, 1 tablet by mouth at bedtime for anxiety, Disp: 30 tablet, Rfl: 5    rosuvastatin (CRESTOR) 20 MG tablet, Take 1 tablet by mouth daily for 10 days, Disp: 10 tablet, Rfl: 0    cloNIDine (CATAPRES) 0.1 MG tablet, TAKE 1 TABLET TWICE DAILY, Disp: 180 tablet, Rfl: 3    amLODIPine (NORVASC) 10 MG tablet, TAKE 1 TABLET EVERY NIGHT, Disp: 90 tablet, Rfl: 3    ranolazine (RANEXA) 500 MG extended release tablet, Take 1 tablet by mouth 2 times daily, Disp: 60 tablet, Rfl: 3    indapamide (LOZOL) 1.25 MG tablet, Take 1 tablet by mouth 2 times daily, Disp: 180 tablet, Rfl: 3    metFORMIN (GLUCOPHAGE) 500 MG tablet, TAKE 2 TABLETS BY MOUTH 2 TIMES DAILY (WITH MEALS) FOR DIABETES.  STOP JANUVIA, Disp: 360 tablet, Rfl: 3    allopurinol (ZYLOPRIM) 100 MG tablet, TAKE 1 TABLET TWICE DAILY, Disp: 180 tablet, Rfl: 3    vitamin B-12 (CYANOCOBALAMIN) 1000 MCG tablet, Take 1 tablet by mouth daily, Disp: 90 tablet, Rfl: 1    clopidogrel (PLAVIX) 75 MG tablet, Take 75 mg by mouth daily, Disp: , Rfl:     carvedilol (COREG) 3.125 MG tablet, Take 3.125 mg by mouth 2 times daily (with meals), Disp: , Rfl:     blood glucose test strips (CONTOUR NEXT TEST) strip, 1 each by In Vitro route daily As needed. , Disp: 100 each, Rfl: 3    aspirin 81 MG tablet, Take 81 mg by mouth daily, Disp: , Rfl:     Ascorbic Acid (VITAMIN C) 250 MG tablet, Take 500 mg by mouth daily , Disp: , Rfl:     vitamin E 1000 UNITS capsule, Take 400 Units by mouth daily , Disp: , Rfl:      No Known Allergies    Social History     Tobacco Use    Smoking status: Never    Smokeless tobacco: Never   Vaping Use    Vaping Use: Never used   Substance Use Topics    Alcohol use: No    Drug use: No       Family History   Problem Relation Age of Onset    No Known Problems Mother     No Known Problems Father        Subjective       Objective   /60 (Site: Left Upper Arm, Position: Standing)   Pulse 80   Ht 5' 10\" (1.778 m)   Wt 238 lb (108 kg)   SpO2 95%   BMI 34.15 kg/m²          CBC reviewed by me  Lab Results   Component Value Date    WBC 6.18 01/23/2023    HGB 12.6 (L) 01/23/2023    HCT 37.5 (L) 01/23/2023    MCV 97.4 (H) 01/23/2023     (L) 01/23/2023     Lab Results   Component Value Date    NEUTROABS 3.90 01/23/2023       VISIT DIAGNOSES  1. Splenomegaly    2. Anemia, unspecified type    3. Thrombocytopenia ([address]    4. B12 deficiency    5. Other fatigue    6. Lightheadedness    7.  Ataxia          ASSESSMENT/PLAN:      Mr. Padilla Lagunas is a pleasant 79-year-old  gentleman who is followed with primary and secondary diagnoses as outlined:  Chronic thrombocytopenia (at least as far back as 2013)  Macrocytosis with mild anemia and thrombocytopenia  B12 deficiency  Kappa light chain MGUS    Mr. Darren Guzman has previously been evaluated in this office by Yesica Parham PA-C and now at the request of his PCP and his wife and the patient, desires to have follow-up in my office for continued evaluation and recommendations. HISTORY OF PRESENT ILLNESS:    [facility] 79 [facility]  [facility] who was seen initially in the office by Genesis Hou PA-C on 6/24/2021 and evaluation of mild thrombocytopenia. At his initial visit, he was also found to have macrocytosis and a slightly elevated IgM level and a kappa/lambda (K/L) light chain ratio. He was being monitored for possible trending serology. Arvind denied night sweats, weight loss, pruritus, or extreme fatigue. At presentation in June 2021, he was still on a daily basis. Serology on 6/24/2021 documented that Kg Pinto was  B12 deficient. Parenteral B12 shots were given weekly, then monthly and and after his September 2021 injection,  was placed on oral B12 replacement of 1000 mcg daily. Kg Pinto took both [employer] as well as the booster. He has contracted COVID-19 on 12/24/2022. He has mild bruising on his arms but denies any progressive bruising, bleeding. His high blood pressure has been controlled well on amlodipine, clonidine, Coreg   He is also on on metformin. Kg Pinto has peripheral vascular disease with carotid endarterectomies. Kg Pinto also has coronary artery disease without any new exacerbations. Marian Kraft had a coronary stent placed on 5/24/2021 by Dr. Cesilia Hernandez. He is on Plavix 75 mg and aspirin 81 daily. Over the last several weeks he has been experiencing issues of dizziness and difficulty with balance. This is was addressed by his PCP Dr.Terri Gisela Urena, who referred him to physical therapy for the balance issues. Serology 6/24/2021  B12 - 260  MMA - 375 (0-378)        CBC today 1/23/2023  reveals a WBC of  6.18 Hgb is 12.6 with an MCV of 97.4 and platelet count of 126,461. Physical examination today, 1/23/2023:   HEENT is unremarkable neck is supple no JVD no palpable adenopathy axilla and inguinal areas do not reveal evidence of palpable lymphadenopathy  The lungs reveal scattered rales, heart regular rate and rhythm normal S1 and S2, no S3  Abdominal exam is without palpable organomegaly or areas of tenderness. Neurological exam is significant for ataxia although he is able to move all 4 extremities equally well, ambulate when holding onto the walls etc.        #1  Thrombocytopenia    Review of CBCs dating back to 2013 in epic document that the thrombocytopenia is chronic without major change. CBC today 1/23/2023  reveals a WBC of  6.18 Hgb is 12.6 with an MCV of 97.4 and platelet count of 882,899. Genesis Hou PA-C discussed potential need for bone marrow aspiration biopsy in the future if there are upward trends and plasma cell dyscrasia serology. PLAN:  Continue conservative monitoring for the present unless new developments encountered. #2  B12 deficiency    Repeat serology including B12 to make sure that he is absorbing oral B12  Continue taking B12 1000 mcg p.o. daily for the present, awaiting B12 serology. #3   IgM kappa gammopathy    Serology 6/24/2021 revealed:  SPEP - No M spike with immunofixation unclear  QI -IgG 1161, IgA 173, IgM 220 ()  Free serum light chains - kappa 54.6 with K/L ratio 2.08  CMP - crt 1.3 with GFR 53, LFTs WNL    Serology 10/11/2021  SPEP - no M spike with immunofixation unclear  QI - IgG 1080, IgA 160, IgM 164  Kappa light chains - Kappa 55.6, Lambda 24.9, K/L ratio 2.23  B2M - 2.6  LDH - 407 - WNL    Serology on 2/8/2022 revealed:  B2M-3.0  Kappa LC-48.70  Lambda LC-23.49  K/L Ratio-2.07  IgG-1161, IgA-155, IgM-161      Bone survey at [address] on 2/8/2022: There are symmetrical lucencies involving the C6 facets seen on the frontal view only. Etiology uncertain, however the symmetry argues against myeloma favors chronic reactive arthritic change  There is degenerative change present throughout the spinal column  Diffuse vascular calcifications.    Scattered retained metallic BB from prior injury  There is a round calcification of the right upper quadrant favoring gallstone  No suspicious lytic lesions localized within the axial or appendicular skeleton    He does not have any B symptoms. He does not have any palpable peripheral lymphadenopathy or splenomegaly. Mariela Lo PA-C discussed potential need for bone marrow aspiration biopsy in the future if there are upward trends and plasma cell dyscrasia serology. Repeat plasma cell dyscrasia serology is being sent off today, 1/23/2023. #4  Ataxia    Etiology of the ataxia is not real clear. Mr. Bo Colon has had carotid endarterectomies and very well could have a CNS reason for this other than possible inner ear problems. Although he is having some work-up started already, I am going to go ahead and get an MRI of the brain with and without contrast to make sure nothing more serious is going on. #5  Tumor screening and health maintenance    Colon cancer screening. He missed his last colonoscopy that was due a few years back because he had a stent recently placed and was on anticoagulants. He denies any change in bowels, rectal bleeding. He no longer gets colonoscopies due to his age. Prostate cancer screening. He no longer gets PSAs due to his age. Immunization History   Administered Date(s) Administered    COVID-19, Moderna, Booster, PF, 50mcg/0.25ml 12/21/2021    COVID-19, Moderna, Primary or Immunocompromised, PF, 100mcg/0.5mL 02/24/2021, 03/31/2021           Orders Placed This Encounter   Procedures    US SPLEEN    MRI BRAIN W WO CONTRAST    Beta 2 Microglobulin, Serum    Iron and TIBC    Lactate Dehydrogenase    Folate    Vitamin B12    TSH    Ferritin          Return in about 2 months (around 3/23/2023) for Follow Up with Adriana Portillo.

## 2023-01-20 ENCOUNTER — TELEPHONE (OUTPATIENT)
Dept: CARDIOLOGY | Facility: CLINIC | Age: 83
End: 2023-01-20
Payer: MEDICARE

## 2023-01-20 NOTE — TELEPHONE ENCOUNTER
Caller: OCTAVIA    Relationship: Child    Best call back number: [phone]    What is the best time to reach you: ANYTIME    What was the call regarding: PATIENT IS FOLLOWING UP ON A PREVIOUS CALL THAT SHE HAD PLACED REGARDING THE MEDICATIONS THAT HER FATHER IS TAKING. PATIENT'S DAUGHTER HAS NOT HEARD BACK FROM CHUCKIE OR DR. ZAFAR AND WOULD LIKE TO KNOW IF THESE MEDICATIONS ARE NECESSARY BEFORE PATIENT'S APPOINTMENT WITH DR. ROBLES ON Monday.     Do you require a callback: YES

## 2023-01-23 ENCOUNTER — OFFICE VISIT (OUTPATIENT)
Dept: HEMATOLOGY | Age: 83
End: 2023-01-23

## 2023-01-23 ENCOUNTER — OFFICE VISIT (OUTPATIENT)
Dept: PRIMARY CARE CLINIC | Age: 83
End: 2023-01-23
Payer: MEDICARE

## 2023-01-23 ENCOUNTER — HOSPITAL ENCOUNTER (OUTPATIENT)
Dept: INFUSION THERAPY | Age: 83
Discharge: HOME | End: 2023-01-23
Payer: MEDICARE

## 2023-01-23 VITALS
SYSTOLIC BLOOD PRESSURE: 119 MMHG | HEART RATE: 78 BPM | BODY MASS INDEX: 34.39 KG/M2 | HEIGHT: 70 IN | TEMPERATURE: 96.9 F | RESPIRATION RATE: 18 BRPM | WEIGHT: 240.2 LBS | OXYGEN SATURATION: 96 % | DIASTOLIC BLOOD PRESSURE: 64 MMHG

## 2023-01-23 VITALS
HEART RATE: 80 BPM | WEIGHT: 238 LBS | DIASTOLIC BLOOD PRESSURE: 60 MMHG | OXYGEN SATURATION: 95 % | HEIGHT: 70 IN | SYSTOLIC BLOOD PRESSURE: 120 MMHG | BODY MASS INDEX: 34.07 KG/M2

## 2023-01-23 DIAGNOSIS — D64.9 ANEMIA, UNSPECIFIED TYPE: ICD-10-CM

## 2023-01-23 DIAGNOSIS — R27.0 ATAXIA: ICD-10-CM

## 2023-01-23 DIAGNOSIS — D69.6 THROMBOCYTOPENIA (HCC): ICD-10-CM

## 2023-01-23 DIAGNOSIS — R06.83 SNORING: ICD-10-CM

## 2023-01-23 DIAGNOSIS — R42 LIGHTHEADEDNESS: Primary | ICD-10-CM

## 2023-01-23 DIAGNOSIS — G47.19 DAYTIME HYPERSOMNOLENCE: ICD-10-CM

## 2023-01-23 DIAGNOSIS — F51.04 CHRONIC INSOMNIA: ICD-10-CM

## 2023-01-23 DIAGNOSIS — R53.83 OTHER FATIGUE: ICD-10-CM

## 2023-01-23 DIAGNOSIS — D64.9 ANEMIA, UNSPECIFIED TYPE: Primary | ICD-10-CM

## 2023-01-23 DIAGNOSIS — G47.10 HYPERSOMNIA, UNSPECIFIED: ICD-10-CM

## 2023-01-23 DIAGNOSIS — D69.6 THROMBOCYTOPENIA (HCC): Primary | ICD-10-CM

## 2023-01-23 DIAGNOSIS — R16.1 SPLENOMEGALY: Primary | ICD-10-CM

## 2023-01-23 DIAGNOSIS — R26.89 BALANCE DISORDER: ICD-10-CM

## 2023-01-23 DIAGNOSIS — R41.3 MEMORY CHANGES: ICD-10-CM

## 2023-01-23 DIAGNOSIS — R42 LIGHTHEADEDNESS: ICD-10-CM

## 2023-01-23 DIAGNOSIS — E53.8 B12 DEFICIENCY: ICD-10-CM

## 2023-01-23 LAB
BASOPHILS ABSOLUTE: 0.02 K/UL (ref 0.01–0.08)
BASOPHILS RELATIVE PERCENT: 0.3 % (ref 0.1–1.2)
EOSINOPHILS ABSOLUTE: 0.03 K/UL (ref 0.04–0.54)
EOSINOPHILS RELATIVE PERCENT: 0.5 % (ref 0.7–7)
HCT VFR BLD CALC: 37.5 % (ref 40.1–51)
HEMOGLOBIN: 12.6 G/DL (ref 13.7–17.5)
LYMPHOCYTES ABSOLUTE: 1.88 K/UL (ref 1.18–3.74)
LYMPHOCYTES RELATIVE PERCENT: 30.4 % (ref 19.3–53.1)
MCH RBC QN AUTO: 32.7 PG (ref 25.7–32.2)
MCHC RBC AUTO-ENTMCNC: 33.6 G/DL (ref 32.3–36.5)
MCV RBC AUTO: 97.4 FL (ref 79–92.2)
MONOCYTES ABSOLUTE: 0.33 K/UL (ref 0.24–0.82)
MONOCYTES RELATIVE PERCENT: 5.3 % (ref 4.7–12.5)
NEUTROPHILS ABSOLUTE: 3.9 K/UL (ref 1.56–6.13)
NEUTROPHILS RELATIVE PERCENT: 63.2 % (ref 34–71.1)
PDW BLD-RTO: 13.9 % (ref 11.6–14.4)
PLATELET # BLD: 117 K/UL (ref 163–337)
PMV BLD AUTO: 11.3 FL (ref 7.4–10.4)
RBC # BLD: 3.85 M/UL (ref 4.63–6.08)
WBC # BLD: 6.18 K/UL (ref 4.23–9.07)

## 2023-01-23 PROCEDURE — 36415 COLL VENOUS BLD VENIPUNCTURE: CPT

## 2023-01-23 PROCEDURE — 99214 OFFICE O/P EST MOD 30 MIN: CPT | Performed by: FAMILY MEDICINE

## 2023-01-23 PROCEDURE — 85025 COMPLETE CBC W/AUTO DIFF WBC: CPT

## 2023-01-23 PROCEDURE — 1036F TOBACCO NON-USER: CPT | Performed by: FAMILY MEDICINE

## 2023-01-23 PROCEDURE — 1123F ACP DISCUSS/DSCN MKR DOCD: CPT | Performed by: FAMILY MEDICINE

## 2023-01-23 PROCEDURE — 3074F SYST BP LT 130 MM HG: CPT | Performed by: FAMILY MEDICINE

## 2023-01-23 PROCEDURE — 3078F DIAST BP <80 MM HG: CPT | Performed by: FAMILY MEDICINE

## 2023-01-23 PROCEDURE — G8427 DOCREV CUR MEDS BY ELIG CLIN: HCPCS | Performed by: FAMILY MEDICINE

## 2023-01-23 PROCEDURE — G8483 FLU IMM NO ADMIN DOC REA: HCPCS | Performed by: FAMILY MEDICINE

## 2023-01-23 PROCEDURE — 99212 OFFICE O/P EST SF 10 MIN: CPT

## 2023-01-23 PROCEDURE — G8417 CALC BMI ABV UP PARAM F/U: HCPCS | Performed by: FAMILY MEDICINE

## 2023-01-23 RX ORDER — DOXEPIN HYDROCHLORIDE 25 MG/1
25 CAPSULE ORAL NIGHTLY
Qty: 30 CAPSULE | Refills: 3 | Status: SHIPPED | OUTPATIENT
Start: 2023-01-23

## 2023-01-23 NOTE — TELEPHONE ENCOUNTER
Bill Crawford MD  You 3 days ago     MR  It would be okay for him to stop taking Plavix, so long as he remains on aspirin 81 mg daily without any missed days     I would advise he can stay on Coreg for now since it is helping many things (blood pressure, heart rate, etc.)        Notified patient's daughter. She voiced gratitude and understanding.Eliza Chapman MA

## 2023-01-23 NOTE — PROGRESS NOTES
SUBJECTIVE:    Christine Pereyra is 79 y.o.male who comes in complaining of Medication Check (Wants to going over his medication he feels that he may be taken things that he no longer needs )   . HPI:Mr. Mosley Home comes in today because he just says he is not back to normal yet. His wife says he still snoring a lot. He falls asleep in the chair during the day. He is trying to get adjusted to his CPAP. He says he just cannot sleep. At one time he was asking me to increase his Ativan but I did not want to do this because of his sleep apnea. He feels off balance at times. Sometimes he says he feels lightheaded. None of this is new and none of it is really getting better. No Known Allergies    Social History     Socioeconomic History    Marital status:      Spouse name: None    Number of children: 3    Years of education: None    Highest education level: None   Occupational History     Employer: [employer]   Tobacco Use    Smoking status: Never    Smokeless tobacco: Never   Vaping Use    Vaping Use: Never used   Substance and Sexual Activity    Alcohol use: No    Drug use: No     Social Determinants of Health     Financial Resource Strain: Low Risk     Difficulty of Paying Living Expenses: Not hard at all   Food Insecurity: No Food Insecurity    Worried About Running Out of Food in the Last Year: Never true    Ran Out of Food in the Last Year: Never true   Physical Activity: Unknown    Days of Exercise per Week: 0 days       Review of Systems   Constitutional:  Positive for fatigue. HENT:          Snoring   Respiratory: Negative. Cardiovascular: Negative. Musculoskeletal:  Positive for arthralgias. Neurological:  Positive for light-headedness. Psychiatric/Behavioral:  Positive for sleep disturbance.         Current Outpatient Medications on File Prior to Visit   Medication Sig Dispense Refill    sertraline (ZOLOFT) 50 MG tablet 1 tablet by mouth at bedtime for anxiety 30 tablet 5 rosuvastatin (CRESTOR) 20 MG tablet Take 1 tablet by mouth daily for 10 days 10 tablet 0    cloNIDine (CATAPRES) 0.1 MG tablet TAKE 1 TABLET TWICE DAILY 180 tablet 3    amLODIPine (NORVASC) 10 MG tablet TAKE 1 TABLET EVERY NIGHT 90 tablet 3    ranolazine (RANEXA) 500 MG extended release tablet Take 1 tablet by mouth 2 times daily 60 tablet 3    indapamide (LOZOL) 1.25 MG tablet Take 1 tablet by mouth 2 times daily 180 tablet 3    metFORMIN (GLUCOPHAGE) 500 MG tablet TAKE 2 TABLETS BY MOUTH 2 TIMES DAILY (WITH MEALS) FOR DIABETES. STOP JANUVIA 360 tablet 3    allopurinol (ZYLOPRIM) 100 MG tablet TAKE 1 TABLET TWICE DAILY 180 tablet 3    vitamin B-12 (CYANOCOBALAMIN) 1000 MCG tablet Take 1 tablet by mouth daily 90 tablet 1    clopidogrel (PLAVIX) 75 MG tablet Take 75 mg by mouth daily      carvedilol (COREG) 3.125 MG tablet Take 3.125 mg by mouth 2 times daily (with meals)      blood glucose test strips (CONTOUR NEXT TEST) strip 1 each by In Vitro route daily As needed. 100 each 3    aspirin 81 MG tablet Take 81 mg by mouth daily      Ascorbic Acid (VITAMIN C) 250 MG tablet Take 500 mg by mouth daily       vitamin E 1000 UNITS capsule Take 400 Units by mouth daily        No current facility-administered medications on file prior to visit. OBJECTIVE:    Wt Readings from Last 3 Encounters:   01/23/23 238 lb (108 kg)   01/23/23 240 lb 3.2 oz (109 kg)   12/24/22 242 lb (109.8 kg)       /64   Pulse 78   Temp 96.9 °F (36.1 °C)   Resp 18   Ht 5' 10\" (1.778 m)   Wt 240 lb 3.2 oz (109 kg)   SpO2 96%   BMI 34.47 kg/m²     Physical Exam  Vitals and nursing note reviewed. Constitutional:       General: He is not in acute distress. Appearance: Normal appearance. He is well-developed. He is not ill-appearing. HENT:      Head: Normocephalic.       Right Ear: Tympanic membrane, ear canal and external ear normal.      Left Ear: Tympanic membrane, ear canal and external ear normal.      Nose: Nose normal. No rhinorrhea. Mouth/Throat:      Mouth: Mucous membranes are moist.      Pharynx: No posterior oropharyngeal erythema. Eyes:      Extraocular Movements: Extraocular movements intact. Conjunctiva/sclera: Conjunctivae normal.      Pupils: Pupils are equal, round, and reactive to light. Cardiovascular:      Rate and Rhythm: Normal rate and regular rhythm. Pulses: Normal pulses. Heart sounds: Normal heart sounds. Pulmonary:      Effort: Pulmonary effort is normal.      Breath sounds: Normal breath sounds. Musculoskeletal:      Cervical back: Normal range of motion and neck supple. Right lower leg: No edema. Left lower leg: No edema. Lymphadenopathy:      Cervical: No cervical adenopathy. Skin:     General: Skin is warm and dry. Neurological:      General: No focal deficit present. Mental Status: He is alert and oriented to person, place, and time. Psychiatric:         Mood and Affect: Mood normal.         Behavior: Behavior normal.         Thought Content: Thought content normal.         Judgment: Judgment normal.       ASSESSMENT:    1. Lightheadedness    2. Balance disorder    3. Chronic insomnia    4. Daytime hypersomnolence    5. Memory changes    6. Snoring    7. Hypersomnia, unspecified           PLAN:  1. Lightheadedness  -     External Referral To Physical Therapy  2. Balance disorder  -     External Referral To Physical Therapy  3. Chronic insomnia  -     doxepin (SINEQUAN) 25 MG capsule; Take 1 capsule by mouth nightly For sleep and anxiety, Disp-30 capsule, R-3Normal  4. Daytime hypersomnolence  -     Home Sleep Study; Future  5. Memory changes  6. Snoring  -     Home Sleep Study; Future  7. Hypersomnia, unspecified   -     Home Sleep Study; Future     His lightheadedness and balance are off. I am going to refer him to physical therapy for further evaluation. Is a new problem.     His chronic insomnia is not improving but I do not want to increase his Ativan. Were going to try doxepin instead which I think will be better in light of his memory changes and Meagan Hoang is developing some short-term memory loss consistent with early dementia. The memory change is a new problem. It could be partly related to COVID. Because of his snoring daytime somnolence and even his memory changes I am going to go ahead and get an home sleep study. If he is not sleeping well that could make all of these things worse. These things are worsening. His cough is better. I do feel that he is recovering from his [place]. Blood pressure was well controlled. Follow-up:  Return in about 2 months (around 3/23/2023) for Recheck. PATIENT INSTRUCTIONS:  Patient Instructions     Wt Readings from Last 3 Encounters:   01/23/23 240 lb 3.2 oz (109 kg)   12/24/22 242 lb (109.8 kg)   12/19/22 242 lb 3.2 oz (109.9 kg)          We are committed to providing you with the best care possible. In order to help us achieve these goals please remember to bring all medications, herbal products, and over the counter supplements with you to each visit. If your provider has ordered testing for you, please be sure to follow up with our office if you have not received results within 7 days after the testing took place. *If you receive a survey after visiting one of our offices, please take time to share your experience concerning your physician office visit. These surveys are confidential and no health information about you is shared. We are eager to improve for you and we are counting on your feedback to help make that happen. Continue to take the sertraline 50 mg faithfully 1 a day for anxiety. It takes 4 to 6 weeks to kick in. I am going to see you in 2 months to see how all this is working. Let physical therapy evaluate you and see if they can improve your balance. I have given you a new sleeping pill called doxepin. Take 1 tablet 1 hour before bedtime.   You may still wake up once throughout the night but that is okay. The main side effect is dry mouth. We will recheck this in 2 months also. EMR Dragon/transcription disclaimer:  Much of this encounter note is electronic transcription/translation of spoken language to printed texts. The electronic translation of spoken language may be erroneous, or at times, nonsensical words or phrases may beinadvertently transcribed.   Although I have reviewed the note for such errors, some may still exist.

## 2023-01-23 NOTE — PATIENT INSTRUCTIONS
Wt Readings from Last 3 Encounters:   01/23/23 240 lb 3.2 oz (109 kg)   12/24/22 242 lb (109.8 kg)   12/19/22 242 lb 3.2 oz (109.9 kg)          We are committed to providing you with the best care possible. In order to help us achieve these goals please remember to bring all medications, herbal products, and over the counter supplements with you to each visit. If your provider has ordered testing for you, please be sure to follow up with our office if you have not received results within 7 days after the testing took place. *If you receive a survey after visiting one of our offices, please take time to share your experience concerning your physician office visit. These surveys are confidential and no health information about you is shared. We are eager to improve for you and we are counting on your feedback to help make that happen. Continue to take the sertraline 50 mg faithfully 1 a day for anxiety. It takes 4 to 6 weeks to kick in. I am going to see you in 2 months to see how all this is working. Let physical therapy evaluate you and see if they can improve your balance. I have given you a new sleeping pill called doxepin. Take 1 tablet 1 hour before bedtime. You may still wake up once throughout the night but that is okay. The main side effect is dry mouth. We will recheck this in 2 months also.

## 2023-01-25 LAB — BETA-2 MICROGLOBULIN: 3.2 MG/L

## 2023-01-26 LAB
+IMM: ABNORMAL
ALBUMIN SERPL-MCNC: 4.23 G/DL (ref 3.75–5.01)
ALPHA-1-GLOBULIN: 0.27 G/DL (ref 0.19–0.46)
ALPHA-2-GLOBULIN: 0.71 G/DL (ref 0.48–1.05)
BETA GLOBULIN: 0.64 G/DL (ref 0.48–1.1)
GAMMA GLOBULIN: 0.95 G/DL (ref 0.62–1.51)
IGA: 124 MG/DL (ref 68–408)
IGG: 979 MG/DL (ref 768–1632)
IGM: 115 MG/DL (ref 35–263)
KAPPA FREE LIGHT CHAINS QNT: 42.83 MG/L (ref 3.3–19.4)
KAPPA/LAMBDA FREE LIGHT CHAIN RATIO: 2.16 (ref 0.26–1.65)
LAMBDA FREE LIGHT CHAINS QNT: 19.85 MG/L (ref 5.71–26.3)
SPE/IFE INTERPRETATION: ABNORMAL
TOTAL PROTEIN: 6.8 G/DL (ref 6.3–8.2)

## 2023-01-29 ASSESSMENT — ENCOUNTER SYMPTOMS: RESPIRATORY NEGATIVE: 1

## 2023-01-30 ENCOUNTER — HOSPITAL ENCOUNTER (OUTPATIENT)
Dept: ULTRASOUND IMAGING | Age: 83
Discharge: HOME | End: 2023-01-30
Payer: MEDICARE

## 2023-01-30 ENCOUNTER — HOSPITAL ENCOUNTER (OUTPATIENT)
Dept: MRI IMAGING | Age: 83
Discharge: HOME | End: 2023-01-30
Payer: MEDICARE

## 2023-01-30 DIAGNOSIS — R27.0 ATAXIA: ICD-10-CM

## 2023-01-30 DIAGNOSIS — R42 LIGHTHEADEDNESS: ICD-10-CM

## 2023-01-30 DIAGNOSIS — R16.1 SPLENOMEGALY: ICD-10-CM

## 2023-01-30 DIAGNOSIS — D64.9 ANEMIA, UNSPECIFIED TYPE: ICD-10-CM

## 2023-01-30 DIAGNOSIS — D69.6 THROMBOCYTOPENIA (HCC): ICD-10-CM

## 2023-01-30 PROCEDURE — A9577 INJ MULTIHANCE: HCPCS | Performed by: INTERNAL MEDICINE

## 2023-01-30 PROCEDURE — 6360000004 HC RX CONTRAST MEDICATION: Performed by: INTERNAL MEDICINE

## 2023-01-30 PROCEDURE — 76705 ECHO EXAM OF ABDOMEN: CPT | Performed by: RADIOLOGY

## 2023-01-30 PROCEDURE — 76705 ECHO EXAM OF ABDOMEN: CPT

## 2023-01-30 PROCEDURE — 70553 MRI BRAIN STEM W/O & W/DYE: CPT | Performed by: RADIOLOGY

## 2023-01-30 PROCEDURE — 70553 MRI BRAIN STEM W/O & W/DYE: CPT

## 2023-01-30 RX ADMIN — GADOBENATE DIMEGLUMINE 20 ML: 529 INJECTION, SOLUTION INTRAVENOUS at 16:00

## 2023-02-02 DIAGNOSIS — R27.0 ATAXIA: ICD-10-CM

## 2023-02-02 DIAGNOSIS — R42 LIGHTHEADEDNESS: Primary | ICD-10-CM

## 2023-02-02 NOTE — PROGRESS NOTES
Spoke with Mr Cee's daughter regarding Dr Gita Enriquez recommendation for referral to neurology for evaluation of persistent dizziness, imbalance issues.   (MRI brain W WO completed at [facility] 1/30/23 as part of evaluation.)

## 2023-02-03 ENCOUNTER — TELEPHONE (OUTPATIENT)
Dept: NEUROLOGY | Age: 83
End: 2023-02-03

## 2023-02-03 NOTE — TELEPHONE ENCOUNTER
Received a referral for this patient. Called and spoke with patient to get him scheduled an appointment with Dr. Anibal Desai. Patient is aware of the appointment time/date.

## 2023-02-07 ENCOUNTER — TELEPHONE (OUTPATIENT)
Dept: PRIMARY CARE CLINIC | Age: 83
End: 2023-02-07

## 2023-02-07 NOTE — TELEPHONE ENCOUNTER
Family states Pt needs Physical Therapy for Balance issues, weakness, and shaking. Pt has a Neurologist appt coming up. Anyone that goes to [facility]. [facility], [facility]  [facility] go there.

## 2023-02-15 RX ORDER — ALLOPURINOL 100 MG/1
TABLET ORAL
Qty: 180 TABLET | Refills: 3 | Status: SHIPPED | OUTPATIENT
Start: 2023-02-15

## 2023-02-20 ENCOUNTER — OFFICE VISIT (OUTPATIENT)
Dept: NEUROLOGY | Age: 83
End: 2023-02-20
Payer: MEDICARE

## 2023-02-20 VITALS
HEART RATE: 66 BPM | DIASTOLIC BLOOD PRESSURE: 76 MMHG | SYSTOLIC BLOOD PRESSURE: 157 MMHG | WEIGHT: 238 LBS | HEIGHT: 70 IN | BODY MASS INDEX: 34.07 KG/M2

## 2023-02-20 DIAGNOSIS — Z86.39 HISTORY OF DIABETES MELLITUS: ICD-10-CM

## 2023-02-20 DIAGNOSIS — R27.0 ATAXIA: Primary | ICD-10-CM

## 2023-02-20 DIAGNOSIS — G25.0 ESSENTIAL TREMOR: ICD-10-CM

## 2023-02-20 PROCEDURE — 1123F ACP DISCUSS/DSCN MKR DOCD: CPT | Performed by: PSYCHIATRY & NEUROLOGY

## 2023-02-20 PROCEDURE — G8417 CALC BMI ABV UP PARAM F/U: HCPCS | Performed by: PSYCHIATRY & NEUROLOGY

## 2023-02-20 PROCEDURE — G8427 DOCREV CUR MEDS BY ELIG CLIN: HCPCS | Performed by: PSYCHIATRY & NEUROLOGY

## 2023-02-20 PROCEDURE — 3079F DIAST BP 80-89 MM HG: CPT | Performed by: PSYCHIATRY & NEUROLOGY

## 2023-02-20 PROCEDURE — 3075F SYST BP GE 130 - 139MM HG: CPT | Performed by: PSYCHIATRY & NEUROLOGY

## 2023-02-20 PROCEDURE — 1036F TOBACCO NON-USER: CPT | Performed by: PSYCHIATRY & NEUROLOGY

## 2023-02-20 PROCEDURE — G8483 FLU IMM NO ADMIN DOC REA: HCPCS | Performed by: PSYCHIATRY & NEUROLOGY

## 2023-02-20 PROCEDURE — 99203 OFFICE O/P NEW LOW 30 MIN: CPT | Performed by: PSYCHIATRY & NEUROLOGY

## 2023-02-20 RX ORDER — FUROSEMIDE 20 MG/1
20 TABLET ORAL
COMMUNITY

## 2023-02-20 NOTE — PROGRESS NOTES
Chief Complaint   Patient presents with    New Patient     Referred by Dr Bills for lightheadedness ataxia        Arvind Cee is a 83 y.o. year old male who is seen for evaluation of ataxia.  He says he had COVID last November and has had poor balance since that time.  Denies any true dizziness or vertigo per se.  He had a fall about 4 days ago.  He denies any change in his gait or new bladder or bowel difficulties.  He is hard of hearing and appears to have some mild cognitive impairment.  He does have diabetes and hypertension.  He otherwise denies any focal neurological difficulties.  He is on a full strength aspirin..    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    Type 2 diabetes mellitus without complication, without long-term current use of insulin (HCC) 09/07/2022    Community acquired bacterial pneumonia 12/02/2022    Chronic systolic heart failure (HCC) 04/27/2021     Resolved Ambulatory Problems     Diagnosis Date Noted    No Resolved Ambulatory Problems     Past Medical History:   Diagnosis Date    Acid reflux     COVID     GERD (gastroesophageal reflux disease)     Hx of blood clots     Hypertension        Past Surgical History:   Procedure Laterality Date    CARDIAC CATHETERIZATION      CARDIOVASCULAR STRESS TEST  07/01/2015    CAROTID ENDARTERECTOMY Left 06/28/2016    CAROTID ENDARTERECTOMY WITH GORETEX ACUSEAL PATCH ANGIOPLASTY performed by Gena Antoine MD at [facility]    CARPAL TUNNEL RELEASE      COLONOSCOPY      LEG  SURGERY      (L) blood clot    VASCULAR SURGERY  7/14/2015 [facility]    [place] right carotid endarterectomy        Family History   Problem Relation Age of Onset    No Known Problems Mother     No Known Problems Father        No Known Allergies    Social History     Socioeconomic History    Marital status:      Spouse name: Not on file    Number of children: 3    Years of education: Not on file    Highest education level: Not on file   Occupational History     Employer: [employer]   Tobacco Use    Smoking status: Never    Smokeless tobacco: Never   Vaping Use    Vaping Use: Never used   Substance and Sexual Activity    Alcohol use: No    Drug use: No    Sexual activity: Not on file   Other Topics Concern    Not on file   Social History Narrative    Not on file     Social Determinants of Health     Financial Resource Strain: Low Risk     Difficulty of Paying Living Expenses: Not hard at all   Food Insecurity: No Food Insecurity    Worried About Running Out of Food in the Last Year: Never true    [address] in the Last Year: Never true   Transportation Needs: Unknown    Lack of Transportation (Medical): Not on file    Lack of Transportation (Non-Medical): No   Physical Activity: Unknown    Days of Exercise per Week: 0 days    Minutes of Exercise per Session: Not on file   Stress: Not on file   Social Connections: Not on file   Intimate Partner Violence: Not on file   Housing Stability: Unknown    Unable to Pay for Housing in the Last Year: Not on file    Number of [place] in the Last Year: Not on file    Unstable Housing in the Last Year: No       Review of Systems  Constitutional - No fever or chills. No diaphoresis or significant fatigue. HENT -  No tinnitus or significant hearing loss.   Eyes - no sudden vision change or eye pain  Respiratory - no significant shortness of breath or cough  Cardiovascular - no chest pain No palpitations or significant leg swelling  Gastrointestinal - no abdominal swelling or pain. Genitourinary - No difficulty urinating, dysuria  Musculoskeletal - no back pain or myalgia. Skin - no color change or rash  Neurologic - No seizures. No lateralizing weakness. Hematologic - no easy bruising or excessive bleeding. Psychiatric - no severe anxiety or nervousness. All other review of systems are negative. Current Outpatient Medications   Medication Sig Dispense Refill    furosemide (LASIX) 20 MG tablet Take 20 mg by mouth      sertraline (ZOLOFT) 50 MG tablet 1 tablet by mouth at bedtime for anxiety 90 tablet 1    allopurinol (ZYLOPRIM) 100 MG tablet TAKE 1 TABLET TWICE DAILY 180 tablet 3    doxepin (SINEQUAN) 25 MG capsule Take 1 capsule by mouth nightly For sleep and anxiety 30 capsule 3    cloNIDine (CATAPRES) 0.1 MG tablet TAKE 1 TABLET TWICE DAILY 180 tablet 3    amLODIPine (NORVASC) 10 MG tablet TAKE 1 TABLET EVERY NIGHT 90 tablet 3    ranolazine (RANEXA) 500 MG extended release tablet Take 1 tablet by mouth 2 times daily 60 tablet 3    indapamide (LOZOL) 1.25 MG tablet Take 1 tablet by mouth 2 times daily 180 tablet 3    metFORMIN (GLUCOPHAGE) 500 MG tablet TAKE 2 TABLETS BY MOUTH 2 TIMES DAILY (WITH MEALS) FOR DIABETES. STOP JANUVIA 360 tablet 3    vitamin B-12 (CYANOCOBALAMIN) 1000 MCG tablet Take 1 tablet by mouth daily 90 tablet 1    clopidogrel (PLAVIX) 75 MG tablet Take 75 mg by mouth daily      carvedilol (COREG) 3.125 MG tablet Take 3.125 mg by mouth 2 times daily (with meals)      blood glucose test strips (CONTOUR NEXT TEST) strip 1 each by In Vitro route daily As needed.  100 each 3    Ascorbic Acid (VITAMIN C) 250 MG tablet Take 500 mg by mouth daily       vitamin E 1000 UNITS capsule Take 400 Units by mouth daily       HYDROcodone-acetaminophen (NORCO) 5-325 MG per tablet       aspirin 325 MG tablet Take 325 mg by mouth daily      rosuvastatin (CRESTOR) 20 MG tablet Take 1 tablet by mouth daily for 10 days 10 tablet 0     No current facility-administered medications for this visit. Outpatient Medications Marked as Taking for the 2/20/23 encounter (Office Visit) with Moise Herron MD   Medication Sig Dispense Refill    furosemide (LASIX) 20 MG tablet Take 20 mg by mouth      sertraline (ZOLOFT) 50 MG tablet 1 tablet by mouth at bedtime for anxiety 90 tablet 1    allopurinol (ZYLOPRIM) 100 MG tablet TAKE 1 TABLET TWICE DAILY 180 tablet 3    doxepin (SINEQUAN) 25 MG capsule Take 1 capsule by mouth nightly For sleep and anxiety 30 capsule 3    cloNIDine (CATAPRES) 0.1 MG tablet TAKE 1 TABLET TWICE DAILY 180 tablet 3    amLODIPine (NORVASC) 10 MG tablet TAKE 1 TABLET EVERY NIGHT 90 tablet 3    ranolazine (RANEXA) 500 MG extended release tablet Take 1 tablet by mouth 2 times daily 60 tablet 3    indapamide (LOZOL) 1.25 MG tablet Take 1 tablet by mouth 2 times daily 180 tablet 3    metFORMIN (GLUCOPHAGE) 500 MG tablet TAKE 2 TABLETS BY MOUTH 2 TIMES DAILY (WITH MEALS) FOR DIABETES. STOP JANUVIA 360 tablet 3    vitamin B-12 (CYANOCOBALAMIN) 1000 MCG tablet Take 1 tablet by mouth daily 90 tablet 1    clopidogrel (PLAVIX) 75 MG tablet Take 75 mg by mouth daily      carvedilol (COREG) 3.125 MG tablet Take 3.125 mg by mouth 2 times daily (with meals)      blood glucose test strips (CONTOUR NEXT TEST) strip 1 each by In Vitro route daily As needed. 100 each 3    [DISCONTINUED] aspirin 81 MG tablet Take 81 mg by mouth daily (Patient not taking: Reported on 2/22/2023)      Ascorbic Acid (VITAMIN C) 250 MG tablet Take 500 mg by mouth daily       vitamin E 1000 UNITS capsule Take 400 Units by mouth daily          BP (!) 157/76   Pulse 66   Ht 5' 10\" (1.778 m)   Wt 238 lb (108 kg)   BMI 34.15 kg/m²       Constitutional - well developed, well nourished. Eyes - conjunctiva normal.  Pupils react to light  Ear, nose, throat -hard of hearing.   No scars, masses, or lesions over external nose or ears, no atrophy of tongue  Neck-symmetric, no masses noted, no jugular vein distension. No bruits noted. Respiration- chest wall appears symmetric, good expansion,   normal effort without use of accessory muscles  Cardiovascular- RRR  Musculoskeletal - no significant wasting of muscles noted, no bony deformities, gait no gross ataxia  Extremities-no clubbing, cyanosis. 3+ edema in the legs and feet  Skin - warm, dry, and intact. No rash, erythema, or pallor. Psychiatric - mood, affect, and behavior appear normal.      Neurological exam  Awake, alert, fluent oriented x 3 appropriate affect  Attention and concentration appear appropriate  Recent and remote memory appears unremarkable  Speech normal without dysarthria  No clear issues with language of fund of knowledge    Cranial Nerve Exam   CN II- Visual fields grossly unremarkable. VA adequate. Discs sharp  CN III, IV,VI- PERRLA, EOMI, No nystagmus, conjugate eye movements, no ptosis  CN V-sensation intact to LT over face  CN VII-no facial asymmetry  CN VIII-Hearing intact   CN IX and X- Palate elevates in midline  CN XI-good shoulder shrug  CN XII-Tongue midline with no fasciculations or fibrillations    Motor Exam  V/V throughout upper and lower extremities bilaterally, no cogwheeling, normal tone    Sensory Exam decreased vibration to the right shin. Decreased pinprick in the toes    Reflexes   2+ biceps bilaterally  2+ brachioradialis  2+ triceps  2+patella  2+ ankle jerks  No clonus ankles  No Osborne's sign bilateral hands. No Babinski sign. Tremors-mild postural tremor with cogwheeling noted on the left    Gait  Normal base and speed  No ataxia.  No Romberg sign    Coordination  Finger to nose and ANGELITO-unremarkable    Lab Results   Component Value Date    [id number] 02/25/2022     Lab Results   Component Value Date    WBC 6.18 01/23/2023    HGB 12.6 (L) 01/23/2023    HCT 37.5 (L) 01/23/2023    MCV 97.4 (H) 01/23/2023     (L) 01/23/2023     Lab Results   Component Value Date     (L) 01/05/2023    K 4.5 01/05/2023    CL 93 (L) 01/05/2023    CO2 25 01/05/2023    BUN 20 01/05/2023    CREATININE 1.3 (H) 01/05/2023    GLUCOSE 106 01/05/2023    CALCIUM 9.7 01/05/2023    PROT 6.8 01/23/2023    LABALBU 4.23 01/23/2023    BILITOT 0.5 12/28/2022    ALKPHOS 69 12/28/2022    AST 22 12/28/2022    ALT 35 12/28/2022    LABGLOM 54 (A) 01/05/2023    GFRAA 58 (L) 09/06/2022    AGRATIO 1.1 10/11/2021    GLOB 2.8 02/08/2022           Assessment    ICD-10-CM    1. Ataxia  R27.0       2. Essential tremor  G25.0       3. History of diabetes mellitus  Z86.39         This patient has an unremarkable exam except for a mild neuropathy which is likely due to diabetes. He is already on B12 replacement. He has a rather nonfocal neurological exam otherwise and is scheduled for gait training with physical therapy. I think that is an excellent idea and that he will respond very well. He may have had a light stroke few months ago. Should his gait fail to improve significantly with therapy of asked him to come back and see me. The patient was encouraged. Plan  No orders of the defined types were placed in this encounter. No orders of the defined types were placed in this encounter. Pt warned of potential side effects of medication changes/new medicines. They are to call for new or ongoing difficulties. Return if symptoms worsen or fail to improve.     (Please note that portions of this note were completed with a voice recognition program. Efforts were made to edit the dictations but occasionally words are mis-transcribed.)

## 2023-02-22 ENCOUNTER — OFFICE VISIT (OUTPATIENT)
Dept: PRIMARY CARE CLINIC | Age: 83
End: 2023-02-22
Payer: MEDICARE

## 2023-02-22 VITALS
DIASTOLIC BLOOD PRESSURE: 82 MMHG | HEART RATE: 71 BPM | OXYGEN SATURATION: 98 % | WEIGHT: 237 LBS | HEIGHT: 70 IN | TEMPERATURE: 97.8 F | SYSTOLIC BLOOD PRESSURE: 138 MMHG | BODY MASS INDEX: 33.93 KG/M2

## 2023-02-22 DIAGNOSIS — R27.0 ATAXIA: Primary | ICD-10-CM

## 2023-02-22 DIAGNOSIS — R29.6 FALLS FREQUENTLY: ICD-10-CM

## 2023-02-22 DIAGNOSIS — I10 ELEVATED BLOOD PRESSURE READING IN OFFICE WITH DIAGNOSIS OF HYPERTENSION: ICD-10-CM

## 2023-02-22 PROCEDURE — 99213 OFFICE O/P EST LOW 20 MIN: CPT | Performed by: FAMILY MEDICINE

## 2023-02-22 PROCEDURE — G8427 DOCREV CUR MEDS BY ELIG CLIN: HCPCS | Performed by: FAMILY MEDICINE

## 2023-02-22 PROCEDURE — G8483 FLU IMM NO ADMIN DOC REA: HCPCS | Performed by: FAMILY MEDICINE

## 2023-02-22 PROCEDURE — 1036F TOBACCO NON-USER: CPT | Performed by: FAMILY MEDICINE

## 2023-02-22 PROCEDURE — G8417 CALC BMI ABV UP PARAM F/U: HCPCS | Performed by: FAMILY MEDICINE

## 2023-02-22 PROCEDURE — 1123F ACP DISCUSS/DSCN MKR DOCD: CPT | Performed by: FAMILY MEDICINE

## 2023-02-22 PROCEDURE — 3079F DIAST BP 80-89 MM HG: CPT | Performed by: FAMILY MEDICINE

## 2023-02-22 PROCEDURE — 3077F SYST BP >= 140 MM HG: CPT | Performed by: FAMILY MEDICINE

## 2023-02-22 RX ORDER — ASPIRIN 325 MG
325 TABLET ORAL DAILY
COMMUNITY

## 2023-02-22 RX ORDER — HYDROCODONE BITARTRATE AND ACETAMINOPHEN 5; 325 MG/1; MG/1
TABLET ORAL
COMMUNITY
Start: 2023-02-02

## 2023-02-22 SDOH — ECONOMIC STABILITY: FOOD INSECURITY: WITHIN THE PAST 12 MONTHS, YOU WORRIED THAT YOUR FOOD WOULD RUN OUT BEFORE YOU GOT MONEY TO BUY MORE.: NEVER TRUE

## 2023-02-22 SDOH — ECONOMIC STABILITY: HOUSING INSECURITY
IN THE LAST 12 MONTHS, WAS THERE A TIME WHEN YOU DID NOT HAVE A STEADY PLACE TO SLEEP OR SLEPT IN A SHELTER (INCLUDING NOW)?: NO

## 2023-02-22 SDOH — ECONOMIC STABILITY: FOOD INSECURITY: WITHIN THE PAST 12 MONTHS, THE FOOD YOU BOUGHT JUST DIDN'T LAST AND YOU DIDN'T HAVE MONEY TO GET MORE.: NEVER TRUE

## 2023-02-22 SDOH — ECONOMIC STABILITY: INCOME INSECURITY: HOW HARD IS IT FOR YOU TO PAY FOR THE VERY BASICS LIKE FOOD, HOUSING, MEDICAL CARE, AND HEATING?: NOT HARD AT ALL

## 2023-02-22 ASSESSMENT — ENCOUNTER SYMPTOMS
BACK PAIN: 0
RESPIRATORY NEGATIVE: 1

## 2023-02-22 ASSESSMENT — PATIENT HEALTH QUESTIONNAIRE - PHQ9
SUM OF ALL RESPONSES TO PHQ QUESTIONS 1-9: 0
1. LITTLE INTEREST OR PLEASURE IN DOING THINGS: 0
SUM OF ALL RESPONSES TO PHQ9 QUESTIONS 1 & 2: 0
2. FEELING DOWN, DEPRESSED OR HOPELESS: 0
SUM OF ALL RESPONSES TO PHQ QUESTIONS 1-9: 0

## 2023-02-22 NOTE — PATIENT INSTRUCTIONS
We are committed to providing you with the best care possible. In order to help us achieve these goals please remember to bring all medications, herbal products, and over the counter supplements with you to each visit. If your provider has ordered testing for you, please be sure to follow up with our office if you have not received results within 7 days after the testing took place. *If you receive a survey after visiting one of our offices, please take time to share your experience concerning your physician office visit. These surveys are confidential and no health information about you is shared. We are eager to improve for you and we are counting on your feedback to help make that happen.        Wt Readings from Last 3 Encounters:   02/22/23 237 lb (107.5 kg)   02/20/23 238 lb (108 kg)   01/23/23 238 lb (108 kg)

## 2023-02-22 NOTE — PROGRESS NOTES
SUBJECTIVE:    Anupam Whitaker is 79 y.o.male who comes in complaining of Referral - General   .       HPI: Mr. Víctor Pryor comes in today with his wife complaining of lack of balance. He is fallen a couple of times. A week ago he lost his balance and hit his left arm and his left ear. His left ear was quite sore for a while. That is better. 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 He did feel that physical therapy might help. He does have hypertension and when he stands up he has to stand for a minute before walking but that is nothing new. This lack of balance, difficulty going up steps, and falls is something new. He was hospitalized for COVID a month or so ago and he says it is definitely worse since then. He denies vertigo. No tinnitus or change in hearing. It is just that he is off balance and when he walks he has to hold onto something. No Known Allergies    Social History     Socioeconomic History    Marital status:      Spouse name: None    Number of children: 3    Years of education: None    Highest education level: None   Occupational History     Employer: [employer]   Tobacco Use    Smoking status: Never    Smokeless tobacco: Never   Vaping Use    Vaping Use: Never used   Substance and Sexual Activity    Alcohol use: No    Drug use: No     Social Determinants of Health     Financial Resource Strain: Low Risk     Difficulty of Paying Living Expenses: Not hard at all   Food Insecurity: No Food Insecurity    Worried About Running Out of Food in the Last Year: Never true    Ran Out of Food in the Last Year: Never true   Transportation Needs: Unknown    Lack of Transportation (Non-Medical): No   Physical Activity: Unknown    Days of Exercise per Week: 0 days   Housing Stability: Unknown    Unstable Housing in the Last Year: No       Review of Systems   Constitutional:  Positive for activity change. Respiratory: Negative. Cardiovascular: Negative. Musculoskeletal:  Positive for gait problem. Negative for back pain, joint swelling and myalgias. Neurological:  Negative for tremors. Hematological:  Bruises/bleeds easily. Psychiatric/Behavioral: Negative. Current Outpatient Medications on File Prior to Visit   Medication Sig Dispense Refill    HYDROcodone-acetaminophen (NORCO) 5-325 MG per tablet       aspirin 325 MG tablet Take 325 mg by mouth daily      furosemide (LASIX) 20 MG tablet Take 20 mg by mouth      sertraline (ZOLOFT) 50 MG tablet 1 tablet by mouth at bedtime for anxiety 90 tablet 1    allopurinol (ZYLOPRIM) 100 MG tablet TAKE 1 TABLET TWICE DAILY 180 tablet 3    doxepin (SINEQUAN) 25 MG capsule Take 1 capsule by mouth nightly For sleep and anxiety 30 capsule 3    cloNIDine (CATAPRES) 0.1 MG tablet TAKE 1 TABLET TWICE DAILY 180 tablet 3    amLODIPine (NORVASC) 10 MG tablet TAKE 1 TABLET EVERY NIGHT 90 tablet 3    ranolazine (RANEXA) 500 MG extended release tablet Take 1 tablet by mouth 2 times daily 60 tablet 3    indapamide (LOZOL) 1.25 MG tablet Take 1 tablet by mouth 2 times daily 180 tablet 3    metFORMIN (GLUCOPHAGE) 500 MG tablet TAKE 2 TABLETS BY MOUTH 2 TIMES DAILY (WITH MEALS) FOR DIABETES. STOP JANUVIA 360 tablet 3    vitamin B-12 (CYANOCOBALAMIN) 1000 MCG tablet Take 1 tablet by mouth daily 90 tablet 1    clopidogrel (PLAVIX) 75 MG tablet Take 75 mg by mouth daily      carvedilol (COREG) 3.125 MG tablet Take 3.125 mg by mouth 2 times daily (with meals)      blood glucose test strips (CONTOUR NEXT TEST) strip 1 each by In Vitro route daily As needed. 100 each 3    Ascorbic Acid (VITAMIN C) 250 MG tablet Take 500 mg by mouth daily       vitamin E 1000 UNITS capsule Take 400 Units by mouth daily       rosuvastatin (CRESTOR) 20 MG tablet Take 1 tablet by mouth daily for 10 days 10 tablet 0     No current facility-administered medications on file prior to visit.           OBJECTIVE:    Wt Readings from Last 3 Encounters: 02/22/23 237 lb (107.5 kg)   02/20/23 238 lb (108 kg)   01/23/23 238 lb (108 kg)       BP (!) 142/80   Pulse 71   Temp 97.8 °F (36.6 °C)   Ht 5' 10\" (1.778 m)   Wt 237 lb (107.5 kg)   SpO2 98%   BMI 34.01 kg/m²     Physical Exam  Vitals and nursing note reviewed. Constitutional:       General: He is not in acute distress. Appearance: Normal appearance. He is well-developed. He is not ill-appearing. HENT:      Head: Normocephalic. Right Ear: Tympanic membrane, ear canal and external ear normal.      Left Ear: Tympanic membrane, ear canal and external ear normal.      Nose: Nose normal. No rhinorrhea. Mouth/Throat:      Mouth: Mucous membranes are moist.      Pharynx: No posterior oropharyngeal erythema. Eyes:      Extraocular Movements: Extraocular movements intact. Conjunctiva/sclera: Conjunctivae normal.      Pupils: Pupils are equal, round, and reactive to light. Neck:      Vascular: No carotid bruit. Cardiovascular:      Rate and Rhythm: Normal rate and regular rhythm. Pulses: Normal pulses. Heart sounds: Normal heart sounds. Pulmonary:      Effort: Pulmonary effort is normal.      Breath sounds: Normal breath sounds. Musculoskeletal:         General: Normal range of motion. Cervical back: Normal range of motion and neck supple. 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 Lymphadenopathy:      Cervical: No cervical adenopathy. Skin:     General: Skin is warm and dry. Neurological:      General: No focal deficit present. Mental Status: He is alert and oriented to person, place, and time. Comments: He is able to get up out of the chair using the arms. He was able to walk a few steps and turn around without falling but he just feels off balance. Psychiatric:         Mood and Affect: Mood normal.         Behavior: Behavior normal.         Thought Content:  Thought content normal. Judgment: Judgment normal.       ASSESSMENT:    1. Ataxia    2. Falls frequently    3. Elevated blood pressure reading in office with diagnosis of hypertension          PLAN:  1. Ataxia  -     External Referral To Physical Therapy  2. Falls frequently  -     External Referral To Physical Therapy  3. Elevated blood pressure reading in office with diagnosis of hypertension     I am going to refer him to [facility] in [address] per his request.  They will also monitor his blood pressure. If it remains elevated we may need to adjust his blood pressure medications. He has an appointment with me in a couple of weeks and we will be able to see if physical therapy is helping. Follow-up:  Return if symptoms worsen or fail to improve, for He has a Medicare annual wellness in March. PATIENT INSTRUCTIONS:  Patient Instructions   We are committed to providing you with the best care possible. In order to help us achieve these goals please remember to bring all medications, herbal products, and over the counter supplements with you to each visit. If your provider has ordered testing for you, please be sure to follow up with our office if you have not received results within 7 days after the testing took place. *If you receive a survey after visiting one of our offices, please take time to share your experience concerning your physician office visit. These surveys are confidential and no health information about you is shared. We are eager to improve for you and we are counting on your feedback to help make that happen. Wt Readings from Last 3 Encounters:   02/22/23 237 lb (107.5 kg)   02/20/23 238 lb (108 kg)   01/23/23 238 lb (108 kg)        EMR Dragon/transcription disclaimer:  Much of this encounter note is electronic transcription/translation of spoken language to printed texts.   The electronic translation of spoken language may be erroneous, or at times, nonsensical words or phrases may beinadvertently transcribed.   Although I have reviewed the note for such errors, some may still exist.

## 2023-03-04 SDOH — ECONOMIC STABILITY: INCOME INSECURITY: HOW HARD IS IT FOR YOU TO PAY FOR THE VERY BASICS LIKE FOOD, HOUSING, MEDICAL CARE, AND HEATING?: NOT HARD AT ALL

## 2023-03-04 SDOH — ECONOMIC STABILITY: FOOD INSECURITY: WITHIN THE PAST 12 MONTHS, YOU WORRIED THAT YOUR FOOD WOULD RUN OUT BEFORE YOU GOT MONEY TO BUY MORE.: NEVER TRUE

## 2023-03-04 SDOH — ECONOMIC STABILITY: FOOD INSECURITY: WITHIN THE PAST 12 MONTHS, THE FOOD YOU BOUGHT JUST DIDN'T LAST AND YOU DIDN'T HAVE MONEY TO GET MORE.: NEVER TRUE

## 2023-03-04 SDOH — ECONOMIC STABILITY: TRANSPORTATION INSECURITY
IN THE PAST 12 MONTHS, HAS LACK OF TRANSPORTATION KEPT YOU FROM MEETINGS, WORK, OR FROM GETTING THINGS NEEDED FOR DAILY LIVING?: NO

## 2023-03-04 SDOH — HEALTH STABILITY: PHYSICAL HEALTH: ON AVERAGE, HOW MANY MINUTES DO YOU ENGAGE IN EXERCISE AT THIS LEVEL?: 10 MIN

## 2023-03-04 SDOH — HEALTH STABILITY: PHYSICAL HEALTH: ON AVERAGE, HOW MANY DAYS PER WEEK DO YOU ENGAGE IN MODERATE TO STRENUOUS EXERCISE (LIKE A BRISK WALK)?: 2 DAYS

## 2023-03-04 ASSESSMENT — PATIENT HEALTH QUESTIONNAIRE - PHQ9
SUM OF ALL RESPONSES TO PHQ QUESTIONS 1-9: 0
SUM OF ALL RESPONSES TO PHQ QUESTIONS 1-9: 0
2. FEELING DOWN, DEPRESSED OR HOPELESS: 0
SUM OF ALL RESPONSES TO PHQ9 QUESTIONS 1 & 2: 0
1. LITTLE INTEREST OR PLEASURE IN DOING THINGS: 0
SUM OF ALL RESPONSES TO PHQ QUESTIONS 1-9: 0
SUM OF ALL RESPONSES TO PHQ QUESTIONS 1-9: 0

## 2023-03-04 ASSESSMENT — LIFESTYLE VARIABLES
HOW MANY STANDARD DRINKS CONTAINING ALCOHOL DO YOU HAVE ON A TYPICAL DAY: 0
HOW OFTEN DO YOU HAVE A DRINK CONTAINING ALCOHOL: NEVER
HOW OFTEN DO YOU HAVE A DRINK CONTAINING ALCOHOL: 1
HOW MANY STANDARD DRINKS CONTAINING ALCOHOL DO YOU HAVE ON A TYPICAL DAY: PATIENT DOES NOT DRINK
HOW OFTEN DO YOU HAVE SIX OR MORE DRINKS ON ONE OCCASION: 1

## 2023-03-06 ENCOUNTER — NURSE ONLY (OUTPATIENT)
Dept: PRIMARY CARE CLINIC | Age: 83
End: 2023-03-06

## 2023-03-06 DIAGNOSIS — E11.9 TYPE 2 DIABETES MELLITUS WITHOUT COMPLICATION, WITHOUT LONG-TERM CURRENT USE OF INSULIN (HCC): ICD-10-CM

## 2023-03-06 DIAGNOSIS — E78.5 HYPERLIPIDEMIA, UNSPECIFIED HYPERLIPIDEMIA TYPE: ICD-10-CM

## 2023-03-06 DIAGNOSIS — I10 ESSENTIAL HYPERTENSION: Primary | ICD-10-CM

## 2023-03-06 LAB
ALBUMIN SERPL-MCNC: 4.2 G/DL (ref 3.5–5.2)
ALP BLD-CCNC: 68 U/L (ref 40–130)
ALT SERPL-CCNC: 49 U/L (ref 5–41)
ANION GAP SERPL CALCULATED.3IONS-SCNC: 10 MMOL/L (ref 7–19)
AST SERPL-CCNC: 38 U/L (ref 5–40)
BILIRUB SERPL-MCNC: 0.4 MG/DL (ref 0.2–1.2)
BUN BLDV-MCNC: 20 MG/DL (ref 8–23)
CALCIUM SERPL-MCNC: 9.9 MG/DL (ref 8.8–10.2)
CHLORIDE BLD-SCNC: 101 MMOL/L (ref 98–111)
CHOLESTEROL, TOTAL: 89 MG/DL (ref 160–199)
CO2: 26 MMOL/L (ref 22–29)
CREAT SERPL-MCNC: 1.2 MG/DL (ref 0.5–1.2)
GFR SERPL CREATININE-BSD FRML MDRD: 60 ML/MIN/{1.73_M2}
GLUCOSE BLD-MCNC: 112 MG/DL (ref 74–109)
HBA1C MFR BLD: 5.3 % (ref 4–6)
HDLC SERPL-MCNC: 49 MG/DL (ref 55–121)
LDL CHOLESTEROL CALCULATED: 16 MG/DL
POTASSIUM SERPL-SCNC: 4.6 MMOL/L (ref 3.5–5)
SODIUM BLD-SCNC: 137 MMOL/L (ref 136–145)
TOTAL PROTEIN: 7.2 G/DL (ref 6.6–8.7)
TRIGL SERPL-MCNC: 119 MG/DL (ref 0–149)

## 2023-03-07 ENCOUNTER — TELEPHONE (OUTPATIENT)
Dept: PRIMARY CARE CLINIC | Age: 83
End: 2023-03-07

## 2023-03-07 ENCOUNTER — OFFICE VISIT (OUTPATIENT)
Dept: PRIMARY CARE CLINIC | Age: 83
End: 2023-03-07

## 2023-03-07 VITALS
TEMPERATURE: 97.8 F | OXYGEN SATURATION: 95 % | BODY MASS INDEX: 34.22 KG/M2 | SYSTOLIC BLOOD PRESSURE: 142 MMHG | HEIGHT: 70 IN | HEART RATE: 70 BPM | DIASTOLIC BLOOD PRESSURE: 80 MMHG | WEIGHT: 239 LBS

## 2023-03-07 DIAGNOSIS — E53.8 B12 DEFICIENCY: ICD-10-CM

## 2023-03-07 DIAGNOSIS — I10 ESSENTIAL HYPERTENSION: ICD-10-CM

## 2023-03-07 DIAGNOSIS — Z00.00 MEDICARE ANNUAL WELLNESS VISIT, SUBSEQUENT: Primary | ICD-10-CM

## 2023-03-07 DIAGNOSIS — E11.9 TYPE 2 DIABETES MELLITUS WITHOUT COMPLICATION, WITHOUT LONG-TERM CURRENT USE OF INSULIN (HCC): ICD-10-CM

## 2023-03-07 LAB — VITAMIN B-12: 556 PG/ML (ref 211–946)

## 2023-03-07 RX ORDER — AMOXICILLIN 500 MG/1
CAPSULE ORAL
COMMUNITY
Start: 2023-02-23

## 2023-03-07 NOTE — TELEPHONE ENCOUNTER
----- Message from Jona Pardo MD sent at 3/7/2023  9:46 AM CST -----  Please call the lab and see if they can add a B12 to his labs that were done earlier. I have ordered it already.   Thank you

## 2023-03-07 NOTE — PATIENT INSTRUCTIONS
Take the sertraline( Zoloft) 50 mg 1 tablet once a day at bedtime. Use up the clonidine you have and that I am going to start you on a medicine called Hytrin which I have sent into your mail order pharmacy. The main side effect is if you stand up quickly it may make you feel a little lightheaded so just get up slowly. When you start the Hytrin check your blood pressure once a day and send them to me or call them to me in about 2 weeks. Preventing Falls: Care Instructions  Overview     Getting around your home safely can be a challenge if you have injuries or health problems that make it easy for you to fall. Loose rugs and furniture in walkways are among the dangers for many older people who have problems walking or who have poor eyesight. People who have conditions such as arthritis, osteoporosis, or dementia also have to be careful not to fall. You can make your home safer with a few simple measures. Follow-up care is a key part of your treatment and safety. Be sure to make and go to all appointments, and call your doctor if you are having problems. It's also a good idea to know your test results and keep a list of the medicines you take. How can you care for yourself at home? Taking care of yourself  Exercise regularly to improve your strength, muscle tone, and balance. Walk if you can. Swimming may be a good choice if you cannot walk easily. Have your vision and hearing checked each year or any time you notice a change. If you have trouble seeing and hearing, you might not be able to avoid objects and could lose your balance. Know the side effects of the medicines you take. Ask your doctor or pharmacist whether the medicines you take can affect your balance. Sleeping pills or sedatives can affect your balance. Limit the amount of alcohol you drink. Alcohol can impair your balance and other senses. Ask your doctor whether calluses or corns on your feet need to be removed.  If you wear loose-fitting shoes because of calluses or corns, you can lose your balance and fall. Talk to your doctor if you have numbness in your feet. You may get dizzy if you do not drink enough water. To prevent dehydration, drink plenty of fluids. Choose water and other clear liquids. If you have kidney, heart, or liver disease and have to limit fluids, talk with your doctor before you increase the amount of fluids you drink. Preventing falls at home  Remove raised doorway thresholds, throw rugs, and clutter. Repair loose carpet or raised areas in the floor. Move furniture and electrical cords to keep them out of walking paths. Use nonskid floor wax, and wipe up spills right away, especially on ceramic tile floors. If you use a walker or cane, put rubber tips on it. If you use crutches, clean the bottoms of them regularly with an abrasive pad, such as steel wool. Keep your house well lit, especially stairways, porches, and outside walkways. Use night-lights in areas such as hallways and bathrooms. Add extra light switches or use remote switches (such as switches that go on or off when you clap your hands) to make it easier to turn lights on if you have to get up during the night. Install sturdy handrails on stairways. Move items in your cabinets so that the things you use a lot are on the lower shelves (about waist level). Keep a cordless phone and a flashlight with new batteries by your bed. If possible, put a phone in each of the main rooms of your house, or carry a cell phone in case you fall and cannot reach a phone. Or, you can wear a device around your neck or wrist. You push a button that sends a signal for help. Wear low-heeled shoes that fit well and give your feet good support. Use footwear with nonskid soles. Check the heels and soles of your shoes for wear. Repair or replace worn heels or soles. Do not wear socks without shoes on smooth floors, such as wood.   Walk on the grass when the sidewalks are slippery. If you live in an area that gets snow and ice in the winter, sprinkle salt on slippery steps and sidewalks. Or ask a family member or friend to do this for you. Preventing falls in the bath  Install grab bars and nonskid mats inside and outside your shower or tub and near the toilet and sinks. Use shower chairs and bath benches. Use a hand-held shower head that will allow you to sit while showering. Get into a tub or shower by putting the weaker leg in first. Get out of a tub or shower with your strong side first.  Repair loose toilet seats and consider installing a raised toilet seat to make getting on and off the toilet easier. Keep your bathroom door unlocked while you are in the shower. Where can you learn more? Go to http://www.hoffman.com/ and enter G117 to learn more about \"Preventing Falls: Care Instructions. \"  Current as of: May 4, 2022               Content Version: 13.5  © 4129-1132 [employer]. Care instructions adapted under license by [facility] ([facility]). If you have questions about a medical condition or this instruction, always ask your healthcare professional. [place] [address] any warranty or liability for your use of this information. For more information on your local Area Agency on Aging or [place] on Aging please visit the appropriate web site below:    [place]: MobileCycles.pl    [facility]: https://aging. [place].gov/    [facility]: https://aging.[place].gov/    [place]: InsuranceSquad.es           Hearing Loss: Care Instructions  Overview     Hearing loss is a sudden or slow decrease in how well you hear. It can range from mild to severe. Permanent hearing loss can occur with aging. It also can happen when you are exposed long-term to loud noise. Examples include listening to loud music, riding motorcycles, or being around other loud machines.   Hearing loss can affect your work and home life. It can make you feel lonely or depressed. You may feel that you have lost your independence. But hearing aids and other devices can help you hear better and feel connected to others. Follow-up care is a key part of your treatment and safety. Be sure to make and go to all appointments, and call your doctor if you are having problems. It's also a good idea to know your test results and keep a list of the medicines you take. How can you care for yourself at home? Avoid loud noises whenever possible. This helps keep your hearing from getting worse. Always wear hearing protection around loud noises. Wear a hearing aid as directed. See a professional who can help you pick a hearing aid that fits you. Have hearing tests as your doctor suggests. They can show whether your hearing has changed. Your hearing aid may need to be adjusted. Use other devices as needed. These may include:  Telephone amplifiers and hearing aids that can connect to a television, stereo, radio, or microphone. Devices that use lights or vibrations. These alert you to the doorbell, a ringing telephone, or a baby monitor. Television closed-captioning. This shows the words at the bottom of the screen. Most new TVs can do this. TTY (text telephone). This lets you type messages back and forth on the telephone instead of talking or listening. These devices are also called TDD. When messages are typed on the keyboard, they are sent over the phone line to a receiving TTY. The message is shown on a monitor. Use text messaging, social media, and email if it is hard for you to communicate by telephone. Try to learn a listening technique called speechreading. It is not lipreading. You pay attention to people's gestures, expressions, posture, and tone of voice. These clues can help you understand what a person is saying. Face the person you are talking to, and have them face you. Make sure the lighting is good.  You need to see the other person's face clearly. Think about counseling if you need help to adjust to your hearing loss. When should you call for help? Watch closely for changes in your health, and be sure to contact your doctor if:    You think your hearing is getting worse. You have new symptoms, such as dizziness or nausea. Where can you learn more? Go to http://www.hoffman.com/ and enter R798 to learn more about \"Hearing Loss: Care Instructions. \"  Current as of: May 4, 2022               Content Version: 13.5  © 3561-8044 [employer]. Care instructions adapted under license by [facility] ([facility]). If you have questions about a medical condition or this instruction, always ask your healthcare professional. [address] any warranty or liability for your use of this information. Learning About Vision Tests  What are vision tests? The four most common vision tests are visual acuity tests, refraction, visual field tests, and color vision tests. Visual acuity (sharpness) tests  These tests are used: To see if you need glasses or contact lenses. To monitor an eye problem. To check an eye injury. Visual acuity tests are done as part of routine exams. You may also have this test when you get your 's license or apply for some types of jobs. Visual field tests  These tests are used: To check for vision loss in any area of your range of vision. To screen for certain eye diseases. To look for nerve damage after a stroke, head injury, or other problem that could reduce blood flow to the brain. Refraction and color tests  A refraction test is done to find the right prescription for glasses and contact lenses. A color vision test is done to check for color blindness.   Color vision is often tested as part of a routine exam. You may also have this test when you apply for a job where recognizing different colors is important, such as , electronics, or the [facility]. How are vision tests done? Visual acuity test   You cover one eye at a time. You read aloud from a wall chart across the room. You read aloud from a small card that you hold in your hand. Refraction   You look into a special device. The device puts lenses of different strengths in front of each eye to see how strong your glasses or contact lenses need to be. Visual field tests   Your doctor may have you look through special machines. Or your doctor may simply have you stare straight ahead while they move a finger into and out of your field of vision. Color vision test   You look at pieces of printed test patterns in various colors. You say what number or symbol you see. Your doctor may have you trace the number or symbol using a pointer. How do these tests feel? There is very little chance of having a problem from this test. If dilating drops are used for a vision test, they may make the eyes sting and cause a medicine taste in the mouth. Follow-up care is a key part of your treatment and safety. Be sure to make and go to all appointments, and call your doctor if you are having problems. It's also a good idea to know your test results and keep a list of the medicines you take. Where can you learn more? Go to http://www.hoffman.com/ and enter G551 to learn more about \"Learning About Vision Tests. \"  Current as of: October 12, 2022               Content Version: 13.5  © 6619-2674 Healthwise[employer]orporated. Care instructions adapted under license by [facility] ([facility]). If you have questions about a medical condition or this instruction, always ask your healthcare professional. [place] [address] any warranty or liability for your use of this information. Preventing Falls: Care Instructions  Overview     Getting around your home safely can be a challenge if you have injuries or health problems that make it easy for you to fall.  Loose rugs and furniture in walkways are among the dangers for many older people who have problems walking or who have poor eyesight. People who have conditions such as arthritis, osteoporosis, or dementia also have to be careful not to fall. You can make your home safer with a few simple measures. Follow-up care is a key part of your treatment and safety. Be sure to make and go to all appointments, and call your doctor if you are having problems. It's also a good idea to know your test results and keep a list of the medicines you take. How can you care for yourself at home? Taking care of yourself  Exercise regularly to improve your strength, muscle tone, and balance. Walk if you can. Swimming may be a good choice if you cannot walk easily. Have your vision and hearing checked each year or any time you notice a change. If you have trouble seeing and hearing, you might not be able to avoid objects and could lose your balance. Know the side effects of the medicines you take. Ask your doctor or pharmacist whether the medicines you take can affect your balance. Sleeping pills or sedatives can affect your balance. Limit the amount of alcohol you drink. Alcohol can impair your balance and other senses. Ask your doctor whether calluses or corns on your feet need to be removed. If you wear loose-fitting shoes because of calluses or corns, you can lose your balance and fall. Talk to your doctor if you have numbness in your feet. You may get dizzy if you do not drink enough water. To prevent dehydration, drink plenty of fluids. Choose water and other clear liquids. If you have kidney, heart, or liver disease and have to limit fluids, talk with your doctor before you increase the amount of fluids you drink. Preventing falls at home  Remove raised doorway thresholds, throw rugs, and clutter. Repair loose carpet or raised areas in the floor. Move furniture and electrical cords to keep them out of walking paths.   Use nonskid floor wax, and wipe up spills right away, especially on ceramic tile floors. If you use a walker or cane, put rubber tips on it. If you use crutches, clean the bottoms of them regularly with an abrasive pad, such as steel wool. Keep your house well lit, especially stairways, porches, and outside walkways. Use night-lights in areas such as hallways and bathrooms. Add extra light switches or use remote switches (such as switches that go on or off when you clap your hands) to make it easier to turn lights on if you have to get up during the night. Install sturdy handrails on stairways. Move items in your cabinets so that the things you use a lot are on the lower shelves (about waist level). Keep a cordless phone and a flashlight with new batteries by your bed. If possible, put a phone in each of the main rooms of your house, or carry a cell phone in case you fall and cannot reach a phone. Or, you can wear a device around your neck or wrist. You push a button that sends a signal for help. Wear low-heeled shoes that fit well and give your feet good support. Use footwear with nonskid soles. Check the heels and soles of your shoes for wear. Repair or replace worn heels or soles. Do not wear socks without shoes on smooth floors, such as wood. Walk on the grass when the sidewalks are slippery. If you live in an area that gets snow and ice in the winter, sprinkle salt on slippery steps and sidewalks. Or ask a family member or friend to do this for you. Preventing falls in the bath  Install grab bars and nonskid mats inside and outside your shower or tub and near the toilet and sinks. Use shower chairs and bath benches. Use a hand-held shower head that will allow you to sit while showering. Get into a tub or shower by putting the weaker leg in first. Get out of a tub or shower with your strong side first.  Repair loose toilet seats and consider installing a raised toilet seat to make getting on and off the toilet easier.   Keep your bathroom door unlocked while you are in the shower. Where can you learn more? Go to http://www.hoffman.com/ and enter G117 to learn more about \"Preventing Falls: Care Instructions. \"  Current as of: May 4, 2022               Content Version: 13.5  © 1068-3182 Healthwise, [employer]. Care instructions adapted under license by [facility] ([facility]). If you have questions about a medical condition or this instruction, always ask your healthcare professional. [place] [address] any warranty or liability for your use of this information. Advance Directives: Care Instructions  Overview  An advance directive is a legal way to state your wishes at the end of your life. It tells your family and your doctor what to do if you can't say what you want. There are two main types of advance directives. You can change them any time your wishes change. Living will. This form tells your family and your doctor your wishes about life support and other treatment. The form is also called a declaration. Medical power of . This form lets you name a person to make treatment decisions for you when you can't speak for yourself. This person is called a health care agent (health care proxy, health care surrogate). The form is also called a durable power of  for health care. If you do not have an advance directive, decisions about your medical care may be made by a family member, or by a doctor or a  who doesn't know you. It may help to think of an advance directive as a gift to the people who care for you. If you have one, they won't have to make tough decisions by themselves. For more information, including forms for your state, see the [address] website (www.caringinfo.org/planning/advance-directives/). Follow-up care is a key part of your treatment and safety. Be sure to make and go to all appointments, and call your doctor if you are having problems.  It's also a good idea to know your test results and keep a list of the medicines you take. What should you include in an advance directive? Many states have a unique advance directive form. (It may ask you to address specific issues.) Or you might use a universal form that's approved by many states. If your form doesn't tell you what to address, it may be hard to know what to include in your advance directive. Use the questions below to help you get started. Who do you want to make decisions about your medical care if you are not able to? What life-support measures do you want if you have a serious illness that gets worse over time or can't be cured? What are you most afraid of that might happen? (Maybe you're afraid of having pain, losing your independence, or being kept alive by machines.)  Where would you prefer to die? (Your home? A hospital? A nursing home?)  Do you want to donate your organs when you die? Do you want certain Jehovah's witness practices performed before you die? When should you call for help? Be sure to contact your doctor if you have any questions. Where can you learn more? Go to http://[place].hoffman.com/ and enter R264 to learn more about \"Advance Directives: Care Instructions. \"  Current as of: June 16, 2022               Content Version: 13.5  © 4842-2902 Healthwise, Incorporated. Care instructions adapted under license by [facility] ([facility]). If you have questions about a medical condition or this instruction, always ask your healthcare professional. [place] [address] any warranty or liability for your use of this information. Starting a Weight Loss Plan: Care Instructions  Overview     If you're thinking about losing weight, it can be hard to know where to start. Your doctor can help you set up a weight loss plan that best meets your needs. You may want to take a class on nutrition or exercise, or you could join a weight loss support group.  If you have questions about how to make changes to your eating or exercise habits, ask your doctor about seeing a registered dietitian or an exercise specialist.  It can be a big challenge to lose weight. But you don't have to make huge changes at once. Make small changes, and stick with them. When those changes become habit, add a few more changes. If you don't think you're ready to make changes right now, try to pick a date in the future. Make an appointment to see your doctor to discuss whether the time is right for you to start a plan. Follow-up care is a key part of your treatment and safety. Be sure to make and go to all appointments, and call your doctor if you are having problems. It's also a good idea to know your test results and keep a list of the medicines you take. How can you care for yourself at home? Set realistic goals. Many people expect to lose much more weight than is likely. A weight loss of 5% to 10% of your body weight may be enough to improve your health. Get family and friends involved to provide support. Talk to them about why you are trying to lose weight, and ask them to help. They can help by participating in exercise and having meals with you, even if they may be eating something different. Find what works best for you. If you do not have time or do not like to cook, a program that offers meal replacement bars or shakes may be better for you. Or if you like to prepare meals, finding a plan that includes daily menus and recipes may be best.  Ask your doctor about other health professionals who can help you achieve your weight loss goals. A dietitian can help you make healthy changes in your diet. An exercise specialist or  can help you develop a safe and effective exercise program.  A counselor or psychiatrist can help you cope with issues such as depression, anxiety, or family problems that can make it hard to focus on weight loss.   Consider joining a support group for people who are trying to lose weight. Your doctor can suggest groups in your area. Where can you learn more? Go to http://www.woods.com/ and enter U357 to learn more about \"Starting a Weight Loss Plan: Care Instructions. \"  Current as of: August 25, 2022               Content Version: 13.5  © 5610-3637 [employer]. Care instructions adapted under license by [facility] ([facility]). If you have questions about a medical condition or this instruction, always ask your healthcare professional. [place] [address] any warranty or liability for your use of this information. A Healthy Heart: Care Instructions  Your Care Instructions     Coronary artery disease, also called heart disease, occurs when a substance called plaque builds up in the vessels that supply oxygen-rich blood to your heart muscle. This can narrow the blood vessels and reduce blood flow. A heart attack happens when blood flow is completely blocked. A high-fat diet, smoking, and other factors increase the risk of heart disease. Your doctor has found that you have a chance of having heart disease. You can do lots of things to keep your heart healthy. It may not be easy, but you can change your diet, exercise more, and quit smoking. These steps really work to lower your chance of heart disease. Follow-up care is a key part of your treatment and safety. Be sure to make and go to all appointments, and call your doctor if you are having problems. It's also a good idea to know your test results and keep a list of the medicines you take. How can you care for yourself at home? Diet    Use less salt when you cook and eat. This helps lower your blood pressure. Taste food before salting. Add only a little salt when you think you need it. With time, your taste buds will adjust to less salt. Eat fewer snack items, fast foods, canned soups, and other high-salt, high-fat, processed foods.      Read food labels and try to avoid saturated and trans fats. They increase your risk of heart disease by raising cholesterol levels. Limit the amount of solid fat-butter, margarine, and shortening-you eat. Use olive, peanut, or canola oil when you cook. Bake, broil, and steam foods instead of frying them. Eat a variety of fruit and vegetables every day. Dark green, deep orange, red, or yellow fruits and vegetables are especially good for you. Examples include spinach, carrots, peaches, and berries. Foods high in fiber can reduce your cholesterol and provide important vitamins and minerals. High-fiber foods include whole-grain cereals and breads, oatmeal, beans, brown rice, citrus fruits, and apples. Eat lean proteins. Heart-healthy proteins include seafood, lean meats and poultry, eggs, beans, peas, nuts, seeds, and soy products. Limit drinks and foods with added sugar. These include candy, desserts, and soda pop. Lifestyle changes    If your doctor recommends it, get more exercise. Walking is a good choice. Bit by bit, increase the amount you walk every day. Try for at least 30 minutes on most days of the week. You also may want to swim, bike, or do other activities. Do not smoke. If you need help quitting, talk to your doctor about stop-smoking programs and medicines. These can increase your chances of quitting for good. Quitting smoking may be the most important step you can take to protect your heart. It is never too late to quit. Limit alcohol to 2 drinks a day for men and 1 drink a day for women. Too much alcohol can cause health problems. Manage other health problems such as diabetes, high blood pressure, and high cholesterol. If you think you may have a problem with alcohol or drug use, talk to your doctor. Medicines    Take your medicines exactly as prescribed. Call your doctor if you think you are having a problem with your medicine. If your doctor recommends aspirin, take the amount directed each day.  Make sure you take aspirin and not another kind of pain reliever, such as acetaminophen (Tylenol). When should you call for help? Call 911 if you have symptoms of a heart attack. These may include:    Chest pain or pressure, or a strange feeling in the chest.     Sweating. Shortness of breath. Pain, pressure, or a strange feeling in the back, neck, jaw, or upper belly or in one or both shoulders or arms. Lightheadedness or sudden weakness. A fast or irregular heartbeat. After you call 911, the  may tell you to chew 1 adult-strength or 2 to 4 low-dose aspirin. Wait for an ambulance. Do not try to drive yourself. Watch closely for changes in your health, and be sure to contact your doctor if you have any problems. Where can you learn more? Go to http://[employer].hoffman.com/ and enter F075 to learn more about \"A Healthy Heart: Care Instructions. \"  Current as of: September 7, 2022               Content Version: 13.5  © 2205-5687 [employer]. Care instructions adapted under license by [facility] ([facility]). If you have questions about a medical condition or this instruction, always ask your healthcare professional. [place] [address] any warranty or liability for your use of this information. Personalized Preventive Plan for Ale Coffman - 3/7/2023  Medicare offers a range of preventive health benefits. Some of the tests and screenings are paid in full while other may be subject to a deductible, co-insurance, and/or copay. Some of these benefits include a comprehensive review of your medical history including lifestyle, illnesses that may run in your family, and various assessments and screenings as appropriate. After reviewing your medical record and screening and assessments performed today your provider may have ordered immunizations, labs, imaging, and/or referrals for you.   A list of these orders (if applicable) as well as your Preventive Care list are included within your After Visit Summary for your review. Other Preventive Recommendations:    A preventive eye exam performed by an eye specialist is recommended every 1-2 years to screen for glaucoma; cataracts, macular degeneration, and other eye disorders. A preventive dental visit is recommended every 6 months. Try to get at least 150 minutes of exercise per week or 10,000 steps per day on a pedometer . Order or download the FREE \"Exercise & Physical Activity: Your Everyday Guide\" from [employer]. Call 7-214.331.8880 or search [employer] online. You need 8785-8927 mg of calcium and 5835-8722 IU of vitamin D per day. It is possible to meet your calcium requirement with diet alone, but a vitamin D supplement is usually necessary to meet this goal.  When exposed to the sun, use a sunscreen that protects against both UVA and UVB radiation with an SPF of 30 or greater. Reapply every 2 to 3 hours or after sweating, drying off with a towel, or swimming. Always wear a seat belt when traveling in a car. Always wear a helmet when riding a bicycle or motorcycle.

## 2023-03-09 NOTE — PROGRESS NOTES
Progress Note      Pt Name: Srikanth Gomes  YOB: 1940  MRN: [id number]    Date of evaluation: 3/13/2023  History Obtained From:  patient, spouse -Micheal Guardian, electronic medical record    CHIEF COMPLAINT:    Chief Complaint   Patient presents with    Follow-up     Thrombocytopenia    Results     MRI Brain, neurology consult       Mr. Srikanth Gomes [facility]) is a pleasant 25-year-old  gentleman who is followed with primary and secondary diagnoses as outlined:  Chronic thrombocytopenia (at least as far back as 2013)  Macrocytosis with mild anemia and thrombocytopenia  B12 deficiency  IgM Kappa light chain MGUS    Arvind took both [place] vaccinations as well as the booster. He has contracted COVID-19 on 12/24/2022. He has mild bruising on his arms but denies any progressive bruising, bleeding. His high blood pressure has been controlled well on amlodipine, clonidine, Coreg   He is also on on metformin. Liz Kang has peripheral vascular disease with carotid endarterectomies. Liz Kang also has coronary artery disease without any new exacerbations. Sofia Montenegro had a coronary stent placed on 5/24/2021 by Dr. Alice Garcia. He is on Plavix 75 mg and aspirin 81 daily. At his clinic visit on 1/23/2021, Audie Сергей reported experiencing issues of dizziness and difficulty with balance. I ordered an MRI of the brain, he was then referred to Dr. Hudson Aguilar for management and has been receiving physical therapy with improvement. Liz Kang comes today accompanied by his wife to review work-up done in January and to report on the issues with disequilibrium. HEMATOLOGY HISTORY: Macrocytosis Thrombocytopenia, B12 deficiency, IgM kappa light chain MGUS  Sofia Montenegro was seen in initial hematology consultation on 6/24/2021 by Radha Javier PA-C referred by Dr. Zoya Trimble for evaluation of thrombocytopenia. Sofia Montenegro had a coronary stent placed on 5/24/2021 by Dr. Alice Garcia. He is on Plavix 75 mg and aspirin 81 daily. He reports that he has known about mild thrombocytopenia going on for some time. According to medical records in epic, mild thrombocytopenia has been present since 2013. He does not have any known history of liver dysfunction. He has never been irregular alcohol user. He may have drank a beer occasionally in his youth. CMP 6/2/2021 revealed slight elevation of ALT of 43 otherwise LFTs WNL. The initial CBC in the office on 6/24/2021 revealed a WBC 6.85 with normal percent differential.  Hgb 13.4 with .5, ,000. Chris Torre PA-C discussed the fact that he has chronic mild thrombocytopenia and reports having bruising issues. He also pointed out that his MCV is slightly elevated. However, he explained to Gerry Grajeda and his wife Marcie Varma that his platelets are not dangerously low by any means. Serology 6/24/2021  B12 - 260  MMA - 375 (0-378)  Folate - 18.5  Copper - 83  Zinc - 118 ()  Antiplatelet ab - Negative  Hepatitis A, B, C panel - Negative  HIV - Non reactive  NAS - Negative  SPEP - No M spike with immunofixation unclear  QI -IgG 1161, IgA 173, IgM 220 ()  Free serum light chains - kappa 54.6 with K/L ratio 2.08  CMP - crt 1.3 with GFR 53, LFTs WNL    At his initial visit, he was found to have macrocytosis and a slightly elevated IgM level and a kappa/lambda (K/L) light chain ratio. He was being monitored for possible trending serology. Serology on 6/24/2021 documented that Marco Blackman was  B12 deficient. Parenteral B12 shots were given weekly, then monthly and and after his September 2021 injection,  was placed on oral B12 replacement of 1000 mcg daily.                   Past Medical History:   Diagnosis Date    Acid reflux     COVID     GERD (gastroesophageal reflux disease)     Hx of blood clots     left leg    Hypertension         Past Surgical History:   Procedure Laterality Date    CARDIAC CATHETERIZATION      CARDIOVASCULAR STRESS TEST  07/01/2015 CAROTID ENDARTERECTOMY Left 06/28/2016    CAROTID ENDARTERECTOMY WITH GORETEX ACUSEAL PATCH ANGIOPLASTY performed by Zack Martínez MD at [facility]      () blood clot    VASCULAR SURGERY  7/14/2015 [facility]    Eversion right carotid endarterectomy            Current Outpatient Medications:     amoxicillin (AMOXIL) 500 MG capsule, , Disp: , Rfl:     metFORMIN (GLUCOPHAGE) 500 MG tablet, TAKE 2 TABLETS BY MOUTH 2 TIMES DAILY (WITH MEALS) FOR DIABETES. STOP JANUVIA, Disp: 360 tablet, Rfl: 3    HYDROcodone-acetaminophen (NORCO) 5-325 MG per tablet, , Disp: , Rfl:     aspirin 325 MG tablet, Take 325 mg by mouth daily, Disp: , Rfl:     furosemide (LASIX) 20 MG tablet, Take 20 mg by mouth, Disp: , Rfl:     sertraline (ZOLOFT) 50 MG tablet, 1 tablet by mouth at bedtime for anxiety, Disp: 90 tablet, Rfl: 1    allopurinol (ZYLOPRIM) 100 MG tablet, TAKE 1 TABLET TWICE DAILY, Disp: 180 tablet, Rfl: 3    doxepin (SINEQUAN) 25 MG capsule, Take 1 capsule by mouth nightly For sleep and anxiety, Disp: 30 capsule, Rfl: 3    cloNIDine (CATAPRES) 0.1 MG tablet, TAKE 1 TABLET TWICE DAILY, Disp: 180 tablet, Rfl: 3    amLODIPine (NORVASC) 10 MG tablet, TAKE 1 TABLET EVERY NIGHT, Disp: 90 tablet, Rfl: 3    ranolazine (RANEXA) 500 MG extended release tablet, Take 1 tablet by mouth 2 times daily, Disp: 60 tablet, Rfl: 3    indapamide (LOZOL) 1.25 MG tablet, Take 1 tablet by mouth 2 times daily, Disp: 180 tablet, Rfl: 3    vitamin B-12 (CYANOCOBALAMIN) 1000 MCG tablet, Take 1 tablet by mouth daily, Disp: 90 tablet, Rfl: 1    clopidogrel (PLAVIX) 75 MG tablet, Take 75 mg by mouth daily, Disp: , Rfl:     carvedilol (COREG) 3.125 MG tablet, Take 3.125 mg by mouth 2 times daily (with meals), Disp: , Rfl:     blood glucose test strips (CONTOUR NEXT TEST) strip, 1 each by In Vitro route daily As needed. , Disp: 100 each, Rfl: 3    Ascorbic Acid (VITAMIN C) 250 MG tablet, Take 500 mg by mouth daily , Disp: , Rfl:     vitamin E 1000 UNITS capsule, Take 400 Units by mouth daily , Disp: , Rfl:     rosuvastatin (CRESTOR) 20 MG tablet, Take 1 tablet by mouth daily for 10 days, Disp: 10 tablet, Rfl: 0     No Known Allergies    Social History     Tobacco Use    Smoking status: Never    Smokeless tobacco: Never   Vaping Use    Vaping Use: Never used   Substance Use Topics    Alcohol use: No    Drug use: No       Family History   Problem Relation Age of Onset    No Known Problems Mother     No Known Problems Father        Subjective       Objective   /70   Pulse 71   Ht 5' 10\" (1.778 m)   Wt 239 lb (108.4 kg)   SpO2 94%   BMI 34.29 kg/m²          CBC reviewed by me  Lab Results   Component Value Date    WBC 7.18 03/13/2023    HGB 13.0 (L) 03/13/2023    HCT 41.1 03/13/2023    .1 (H) 03/13/2023     (L) 03/13/2023     Lab Results   Component Value Date    NEUTROABS 4.52 03/13/2023       VISIT DIAGNOSES  1. Thrombocytopenia ([address]    2. Macrocytosis    3. Anemia, unspecified type            ASSESSMENT/PLAN:      [facility] 79-year[facility]  gentleman who is followed with primary and secondary diagnoses as outlined:  Chronic thrombocytopenia (at least as far back as 2013)  Macrocytosis with mild anemia and thrombocytopenia  B12 deficiency  IgM Kappa light chain MGUS    Arvind took both [place] vaccinations as well as the booster. He has contracted COVID-19 on 12/24/2022. He has mild bruising on his arms but denies any progressive bruising, bleeding. His high blood pressure has been controlled well on amlodipine, clonidine, Coreg   He is also on on metformin. Shashi Whalen has peripheral vascular disease with carotid endarterectomies. Shashi Whalen also has coronary artery disease without any new exacerbations. Mera Higginbotham had a coronary stent placed on 5/24/2021 by Dr. Tez Osorio. He is on Plavix 75 mg and aspirin 81 daily.      At his clinic visit on 1/23/2021, kaila reported experiencing issues of dizziness and difficulty with balance.  I ordered an MRI of the brain, he was then referred to Dr. Iglesias for management and has been receiving physical therapy with improvement.    Arvind comes today accompanied by his wife to review work-up done in January and to report on the issues with disequilibrium.      CBC today 3/13/2023   reveals a WBC of  7.18 Hgb is 13.0 with an MCV of 105.1  and platelet count of 129,000.    Physical examination today, 3/13/2023:  HEENT is unremarkable neck is supple no JVD no palpable adenopathy axilla and inguinal areas do not reveal evidence of palpable lymphadenopathy  The lungs reveal scattered rales, heart regular rate and rhythm normal S1 and S2, no S3  Abdominal exam is without palpable organomegaly or areas of tenderness.  Neurological exam is significant for ataxia although he is able to move all 4 extremities equally well, ambulate when holding onto the walls etc.        #1  Thrombocytopenia    Review of CBCs dating back to 2013 in epic document that the thrombocytopenia is chronic without major change.    CBC today 1/23/2023  reveals a WBC of  6.18 Hgb is 12.6 with an MCV of 97.4 and platelet count of 117,000.     Fabrice Aldana PA-C discussed potential need for bone marrow aspiration biopsy in the future if there are upward trends and plasma cell dyscrasia serology.     PLAN:  Continue conservative monitoring for the present unless new developments encountered.    #2  B12 deficiency    Repeat serology including B12 to make sure that he is absorbing oral B12  Continue taking B12 1000 mcg p.o. daily for the present, awaiting B12 serology.    A B12 level at his PCPs office was normal earlier this month.  Due to the increasing macrocytosis, I am going to repeat a folic acid level and TSH.    #3   IgM kappa gammopathy    Serology 6/24/2021 revealed:  SPEP - No M spike with immunofixation unclear  QI -IgG 1161, IgA 173, IgM 220  ()  Free serum light chains - kappa 54.6 with K/L ratio 2.08  CMP - crt 1.3 with GFR 53, LFTs WNL    Serology 10/11/2021  SPEP - no M spike with immunofixation unclear  QI - IgG 1080, IgA 160, IgM 164  Kappa light chains - Kappa 55.6, Lambda 24.9, K/L ratio 2.23  B2M - 2.6  LDH - 407 - WNL    Serology on 2/8/2022 revealed:  B2M-3.0  Kappa LC-48.70  Lambda LC-23.49  K/L Ratio-2.07  IgG-1161, IgA-155, IgM-161    Serology 1/23/2023  B2M-3.2  Kappa light chains- 42.83  Lambda light chains-19.85  K/L ratio-2.16  IgG-979 ,IgA-124 , IgM-115  M-spike-not observed      Bone survey at [facility] on 2/8/2022: There are symmetrical lucencies involving the C6 facets seen on the frontal view only. Etiology uncertain, however the symmetry argues against myeloma favors chronic reactive arthritic change  There is degenerative change present throughout the spinal column  Diffuse vascular calcifications. Scattered retained metallic BB from prior injury  There is a round calcification of the right upper quadrant favoring gallstone  No suspicious lytic lesions localized within the axial or appendicular skeleton    He does not have any B symptoms. He does not have any palpable peripheral lymphadenopathy or splenomegaly. Kin Sheriff PA-C discussed potential need for bone marrow aspiration biopsy in the future if there are upward trends and plasma cell dyscrasia serology. Plasma cell dyscrasia serology including SPEP, kappa and lambda light chains, quantitative immunoglobulins will be done prior to his return visit in 6 months    #4  Ataxia    Etiology of the ataxia is not real clear. Mr. Kelby Gotti has had carotid endarterectomies and very well could have a CNS reason for this other than possible inner ear problems. Although he is having some work-up started already, I am going to go ahead and get an MRI of the brain with and without contrast to make sure nothing more serious is going on.     MRI Brain W WO at [facility] on 1/30/2023  Findings compatible with chronic microvascular ischemic change and diffuse cortical atrophy. Encephalomalacia within the RIGHT frontal lobe. No diffusion restriction is identified to suggest acute infarct. No abnormal contrast enhancement is identified. Referred to Dr Melanie Gupta, appointment 2/20/2023  This patient has an unremarkable exam except for a mild neuropathy which is likely due to diabetes. He is already on B12 replacement. He has a rather nonfocal neurological exam otherwise and is scheduled for gait training with physical therapy. I think that is an excellent idea and that he will respond very well. He may have had a light stroke few months ago. Should his gait fail to improve significantly with therapy of asked him to come back and see me. The patient was encouraged. He states that he has significant improvement with this equilibrium/ataxia. He states he is not completely back to normal but significantly improved, does not have to hang onto the walls when walking down the [place]. #5  Tumor screening and health maintenance    Colon cancer screening. He missed his last colonoscopy that was due a few years back because he had a stent recently placed and was on anticoagulants. He denies any change in bowels, rectal bleeding. He no longer gets colonoscopies due to his age. Prostate cancer screening. He no longer gets PSAs due to his age.       Immunization History   Administered Date(s) Administered    COVID-19, Moderna, Booster, PF, 50mcg/0.25ml 12/21/2021    COVID-19, Moderna, Primary or Immunocompromised, PF, 100mcg/0.5mL 02/24/2021, 03/31/2021           Orders Placed This Encounter   Procedures    TSH    Ferritin    Iron and TIBC    Folate    Lactate Dehydrogenase    MONOCLONAL PROTEIN AND FLC, SERUM    Beta 2 Microglobulin, Serum    Ferritin    Iron and TIBC    Vitamin B12    Folate    TSH    Lactate Dehydrogenase            Return in about 6 months (around 9/13/2023) for Follow-up with Dr. Abraham Wright with labs prior to.

## 2023-03-12 NOTE — ASSESSMENT & PLAN NOTE
He does not meet the criteria for diabetic shoes. Continue metformin 500 mg 1 twice a day. Diabetes is under excellent control. He is having no hypoglycemic episodes. Hemoglobin A1C   Date Value Ref Range Status   03/06/2023 5.3 4.0 - 6.0 % Final     Comment:     HbA1c levels >6% are an indication of hyperglycemia during the preceding 2  to 3 months or longer. HbA1c levels may reach 20% or higher in poorly controlled diabetes. Therapeutic action is suggested at levels above 8%. Diabetes patients with HbA1c levels below 7% meet the goal of the American  Diabetes Association.     HbA1c levels below the established reference range may indicate recent  episodes of hypoglycemia, the presence of Hb variants, or shortened lifetime  of erythrocytes.

## 2023-03-12 NOTE — ASSESSMENT & PLAN NOTE
Blood Pressure is not controlled at 142/80. He is asymptomatic. Continue amlodipine 10 mg 1 daily along with clonidine 0.1 mg twice a day. He is also on indapamide 1.25 mg 1 a day. His cardiologist has him on Coreg 3.125 mg twice a day and Lasix 20 mg 1 daily. He will continue all of these things. Monitor blood pressure at home and call me with the blood pressures in 1 week.

## 2023-03-12 NOTE — PROGRESS NOTES
Medicare Annual Wellness Visit    Danny Lopez is here for Medicare AWV (Recent labs printed for review. Patient is still in physical therapy for his balance.  )    Assessment & Plan   Medicare annual wellness visit, subsequent  Type 2 diabetes mellitus without complication, without long-term current use of insulin ([address]  Assessment & Plan:  He does not meet the criteria for diabetic shoes. Continue metformin 500 mg 1 twice a day. Diabetes is under excellent control. He is having no hypoglycemic episodes. Hemoglobin A1C   Date Value Ref Range Status   03/06/2023 5.3 4.0 - 6.0 % Final     Comment:     HbA1c levels >6% are an indication of hyperglycemia during the preceding 2  to 3 months or longer. HbA1c levels may reach 20% or higher in poorly controlled diabetes. Therapeutic action is suggested at levels above 8%. Diabetes patients with HbA1c levels below 7% meet the goal of the American  Diabetes Association. HbA1c levels below the established reference range may indicate recent  episodes of hypoglycemia, the presence of Hb variants, or shortened lifetime  of erythrocytes. Orders:  -      DIABETES FOOT EXAM  B12 deficiency  -     Vitamin B12  Essential hypertension  Assessment & Plan:  Blood Pressure is not controlled at 142/80. He is asymptomatic. Continue amlodipine 10 mg 1 daily along with clonidine 0.1 mg twice a day. He is also on indapamide 1.25 mg 1 a day. His cardiologist has him on Coreg 3.125 mg twice a day and Lasix 20 mg 1 daily. He will continue all of these things. Monitor blood pressure at home and call me with the blood pressures in 1 week. Recommendations for Preventive Services Due: see orders and patient instructions/AVS.  Recommended screening schedule for the next 5-10 years is provided to the patient in written form: see Patient Instructions/AVS.     Return in 6 months (on 9/7/2023) for recheck.      Subjective   Mr. Sherin Dao comes in today accompanied by his wife for a Medicare annual wellness. He says he is doing better. He is going to physical therapy and thinks it helps. He has had no further falls. He states he was once told that he had B12 deficiency but he does not take shots. He wants to make sure that his pills are working. He is still tired. He is checking his glucoses and they are ranging anywhere from 150-200. He denies any episodes of hypoglycemia. No increased numbness or tingling in his feet. He denies polyuria, polyphagia or polydipsia. Patient's complete Health Risk Assessment and screening values have been reviewed and are found in Flowsheets. The following problems were reviewed today and where indicated follow up appointments were made and/or referrals ordered. Positive Risk Factor Screenings with Interventions:    Fall Risk:  Do you feel unsteady or are you worried about falling? : (!) yes  2 or more falls in past year?: (!) yes  Fall with injury in past year?: (!) yes     Interventions: This is not a new problem. He is going to physical therapy. He feels that it is helping. Opioid Risk: (Low risk score <55) Opioid risk score: 18    Patient is low risk for opioid use disorder or overdose. Last PDMP Deniz Ellison as Reviewed:  Review User Review Instant Review Result   Gia Mancuso 1/9/2023 12:56 PM     Reviewed PDMP [1]        Social and Emotional Support:  Do you get the social and emotional support that you need?: (!) No  Interventions:  He actually says he did not mean to check this 1. He lives with his wife and she is very supportive and helpful.     Weight and Activity:  Physical Activity: Insufficiently Active    Days of Exercise per Week: 2 days    Minutes of Exercise per Session: 10 min     On average, how many days per week do you engage in moderate to strenuous exercise (like a brisk walk)?: 2 days  Have you lost any weight without trying in the past 3 months?: No  Body mass index: (!) 34.29  Obesity Interventions: Wt Readings from Last 3 Encounters:   03/07/23 239 lb (108.4 kg)   02/22/23 237 lb (107.5 kg)   02/20/23 238 lb (108 kg)   Discussed eating healthy. He is really not able to exercise due to his weakness and falls. Hearing Screen:  Do you or your family notice any trouble with your hearing that hasn't been managed with hearing aids?: (!) Yes    Interventions:  He says if it worsens he will let me know and I will refer him to the ENT or audiologist.    Sergio Perdue:  Do you have difficulty driving, watching TV, or doing any of your daily activities because of your eyesight?: (!) Yes  Have you had an eye exam within the past year?: Appointment is scheduled  No results found. Interventions:   Patient encouraged to make appointment with their eye specialist    Safety:  Do you have either shower bars, grab bars, non-slip mats or non-slip surfaces in your shower or bathtub?: (!) No  Do all of your stairways have a railing or banister?: (!) No  Interventions:  See AVS for additional education material     Advanced Directives:  Do you have a Living Will?: (!) No    Intervention:  has NO advanced directive - information provided  Says he does not need this because his wife and kids know what he wants. I tried to explain to him that it is just good to have it in place but he said he did not want to be ask about it again. Objective   Vitals:    03/07/23 0922   BP: (!) 142/80   Pulse: 70   Temp: 97.8 °F (36.6 °C)   SpO2: 95%   Weight: 239 lb (108.4 kg)   Height: 5' 10\" (1.778 m)      Body mass index is 34.29 kg/m². General Appearance: alert and oriented to person, place and time, well developed and well- nourished, in no acute distress.  overweight  Skin: warm and dry, no rash or erythema  Head: normocephalic and atraumatic  Eyes: pupils equal, round, and reactive to light, extraocular eye movements intact, conjunctivae normal  ENT: tympanic membrane, external ear and ear canal normal bilaterally, nose without deformity, nasal mucosa and turbinates normal without polyps  Neck: supple and non-tender without mass, no thyromegaly or thyroid nodules, no cervical lymphadenopathy  Pulmonary/Chest: clear to auscultation bilaterally- no wheezes, rales or rhonchi, normal air movement, no respiratory distress  Cardiovascular: normal rate, regular rhythm, normal S1 and S2, no murmurs, rubs, clicks, or gallops, distal pulses intact, no carotid bruits  Abdomen: soft, non-tender, non-distended, normal bowel sounds, no masses or organomegaly  Extremities: no cyanosis, clubbing or edema, chronic changes of OA  Musculoskeletal: normal range of motion, no joint swelling, deformity or tenderness  Neurologic: reflexes normal and symmetric, no cranial nerve deficit, gait, coordination and speech normal       No Known Allergies  Prior to Visit Medications    Medication Sig Taking? Authorizing Provider   amoxicillin (AMOXIL) 500 MG capsule  Yes Historical Provider, MD   metFORMIN (GLUCOPHAGE) 500 MG tablet TAKE 2 TABLETS BY MOUTH 2 TIMES DAILY (WITH MEALS) FOR DIABETES.  Pernell Ramon Yes Jazmine Muñiz MD   HYDROcodone-acetaminophen (Caffie Lucio) 5-325 MG per tablet  Yes Historical Provider, MD   aspirin 325 MG tablet Take 325 mg by mouth daily Yes Historical Provider, MD   furosemide (LASIX) 20 MG tablet Take 20 mg by mouth Yes Historical Provider, MD   sertraline (ZOLOFT) 50 MG tablet 1 tablet by mouth at bedtime for anxiety Yes Jazmine Muñiz MD   allopurinol (ZYLOPRIM) 100 MG tablet TAKE 1 TABLET TWICE DAILY Yes Jazmine Muñiz MD   doxepin (SINEQUAN) 25 MG capsule Take 1 capsule by mouth nightly For sleep and anxiety Yes Jazmine Muñiz MD   rosuvastatin (CRESTOR) 20 MG tablet Take 1 tablet by mouth daily for 10 days Yes Dina Cushing, MD   cloNIDine (CATAPRES) 0.1 MG tablet TAKE 1 TABLET TWICE DAILY Yes Jazmine Muñiz MD   amLODIPine (NORVASC) 10 MG tablet TAKE 1 TABLET EVERY NIGHT Yes Jazmine Muñiz MD ranolazine (RANEXA) 500 MG extended release tablet Take 1 tablet by mouth 2 times daily Yes Lovely Altamirano MD   indapamide (LOZOL) 1.25 MG tablet Take 1 tablet by mouth 2 times daily Yes Lovely Altamirano MD   vitamin B-12 (CYANOCOBALAMIN) 1000 MCG tablet Take 1 tablet by mouth daily Yes Doris Henson PA-C   clopidogrel (PLAVIX) 75 MG tablet Take 75 mg by mouth daily Yes Historical Provider, MD   carvedilol (COREG) 3.125 MG tablet Take 3.125 mg by mouth 2 times daily (with meals) Yes Historical Provider, MD   blood glucose test strips (CONTOUR NEXT TEST) strip 1 each by In Vitro route daily As needed.  Yes Lovely Altamirano MD   Ascorbic Acid (VITAMIN C) 250 MG tablet Take 500 mg by mouth daily  Yes Historical Provider, MD   vitamin E 1000 UNITS capsule Take 400 Units by mouth daily  Yes Historical Provider, MD       CareTeam (Including outside providers/suppliers regularly involved in providing care):   Patient Care Team:  Lovely Altamirano MD as PCP - General (Family Medicine)  Lovely Altamirano MD as PCP - Empaneled Provider  No Team Members  Austin Lynn MD as Neurologist (Neurology)     Reviewed and updated this visit:  Tobacco  Allergies  Meds  Med Hx  Surg Hx  Soc Hx  Fam Hx             Lovely Altamirano MD

## 2023-03-13 ENCOUNTER — OFFICE VISIT (OUTPATIENT)
Dept: HEMATOLOGY | Age: 83
End: 2023-03-13
Payer: MEDICARE

## 2023-03-13 ENCOUNTER — HOSPITAL ENCOUNTER (OUTPATIENT)
Dept: INFUSION THERAPY | Age: 83
Discharge: HOME | End: 2023-03-13
Payer: MEDICARE

## 2023-03-13 VITALS
OXYGEN SATURATION: 94 % | HEART RATE: 71 BPM | WEIGHT: 239 LBS | BODY MASS INDEX: 34.22 KG/M2 | DIASTOLIC BLOOD PRESSURE: 70 MMHG | SYSTOLIC BLOOD PRESSURE: 128 MMHG | HEIGHT: 70 IN

## 2023-03-13 DIAGNOSIS — D69.6 THROMBOCYTOPENIA (HCC): ICD-10-CM

## 2023-03-13 DIAGNOSIS — D75.89 MACROCYTOSIS: ICD-10-CM

## 2023-03-13 DIAGNOSIS — D69.6 THROMBOCYTOPENIA (HCC): Primary | ICD-10-CM

## 2023-03-13 DIAGNOSIS — D64.9 ANEMIA, UNSPECIFIED TYPE: ICD-10-CM

## 2023-03-13 LAB
BASOPHILS ABSOLUTE: 0.01 K/UL (ref 0.01–0.08)
BASOPHILS RELATIVE PERCENT: 0.1 % (ref 0.1–1.2)
EOSINOPHILS ABSOLUTE: 0.14 K/UL (ref 0.04–0.54)
EOSINOPHILS RELATIVE PERCENT: 1.9 % (ref 0.7–7)
FERRITIN: 132.5 NG/ML (ref 30–400)
FOLATE: 10.2 NG/ML (ref 4.5–32.2)
HCT VFR BLD CALC: 41.1 % (ref 40.1–51)
HEMOGLOBIN: 13 G/DL (ref 13.7–17.5)
IRON SATURATION: 24 % (ref 14–50)
IRON: 73 UG/DL (ref 59–158)
LYMPHOCYTES ABSOLUTE: 2.08 K/UL (ref 1.18–3.74)
LYMPHOCYTES RELATIVE PERCENT: 29 % (ref 19.3–53.1)
MCH RBC QN AUTO: 33.2 PG (ref 25.7–32.2)
MCHC RBC AUTO-ENTMCNC: 31.6 G/DL (ref 32.3–36.5)
MCV RBC AUTO: 105.1 FL (ref 79–92.2)
MONOCYTES ABSOLUTE: 0.41 K/UL (ref 0.24–0.82)
MONOCYTES RELATIVE PERCENT: 5.7 % (ref 4.7–12.5)
NEUTROPHILS ABSOLUTE: 4.52 K/UL (ref 1.56–6.13)
NEUTROPHILS RELATIVE PERCENT: 63 % (ref 34–71.1)
PDW BLD-RTO: 13.5 % (ref 11.6–14.4)
PLATELET # BLD: 129 K/UL (ref 163–337)
PMV BLD AUTO: 11.7 FL (ref 7.4–10.4)
RBC # BLD: 3.91 M/UL (ref 4.63–6.08)
TOTAL IRON BINDING CAPACITY: 307 UG/DL (ref 250–400)
TSH SERPL DL<=0.05 MIU/L-ACNC: 1.86 UIU/ML (ref 0.27–4.2)
WBC # BLD: 7.18 K/UL (ref 4.23–9.07)

## 2023-03-13 PROCEDURE — 1123F ACP DISCUSS/DSCN MKR DOCD: CPT | Performed by: INTERNAL MEDICINE

## 2023-03-13 PROCEDURE — 36415 COLL VENOUS BLD VENIPUNCTURE: CPT

## 2023-03-13 PROCEDURE — 3074F SYST BP LT 130 MM HG: CPT | Performed by: INTERNAL MEDICINE

## 2023-03-13 PROCEDURE — 3078F DIAST BP <80 MM HG: CPT | Performed by: INTERNAL MEDICINE

## 2023-03-13 PROCEDURE — 85025 COMPLETE CBC W/AUTO DIFF WBC: CPT

## 2023-03-13 PROCEDURE — G8483 FLU IMM NO ADMIN DOC REA: HCPCS | Performed by: INTERNAL MEDICINE

## 2023-03-13 PROCEDURE — 99214 OFFICE O/P EST MOD 30 MIN: CPT | Performed by: INTERNAL MEDICINE

## 2023-03-13 PROCEDURE — 1036F TOBACCO NON-USER: CPT | Performed by: INTERNAL MEDICINE

## 2023-03-13 PROCEDURE — 99212 OFFICE O/P EST SF 10 MIN: CPT

## 2023-03-13 PROCEDURE — G8417 CALC BMI ABV UP PARAM F/U: HCPCS | Performed by: INTERNAL MEDICINE

## 2023-03-13 PROCEDURE — G8427 DOCREV CUR MEDS BY ELIG CLIN: HCPCS | Performed by: INTERNAL MEDICINE

## 2023-03-28 ENCOUNTER — TELEPHONE (OUTPATIENT)
Dept: PRIMARY CARE CLINIC | Age: 83
End: 2023-03-28

## 2023-03-28 NOTE — TELEPHONE ENCOUNTER
Daughter has asked that we not tell him what he is here for. States that he will be coming in alone but she would like to be on the phone with during his appt tomorrow. I also explained that she is not on his [employer] form and that we will not be able to give any medical info to her unless she is on that form. She voiced her understanding and she will call her dad to ask him if he will put her on the form when he comes in tomorrow.

## 2023-03-28 NOTE — TELEPHONE ENCOUNTER
Daughter has called in regards to her fathers appt tomorrow. I am not able to give her any info due to the PT's Xochitlhugo Perkins does not have her on it. I had to leave a VM due to she did not answer to let her know and she what she needed.

## 2023-03-29 ENCOUNTER — OFFICE VISIT (OUTPATIENT)
Dept: PRIMARY CARE CLINIC | Age: 83
End: 2023-03-29
Payer: MEDICARE

## 2023-03-29 VITALS
BODY MASS INDEX: 34.99 KG/M2 | WEIGHT: 244.4 LBS | HEIGHT: 70 IN | RESPIRATION RATE: 18 BRPM | SYSTOLIC BLOOD PRESSURE: 154 MMHG | HEART RATE: 76 BPM | OXYGEN SATURATION: 96 % | DIASTOLIC BLOOD PRESSURE: 80 MMHG | TEMPERATURE: 97.2 F

## 2023-03-29 DIAGNOSIS — N40.0 ENLARGED PROSTATE: ICD-10-CM

## 2023-03-29 DIAGNOSIS — R32 URINARY INCONTINENCE, UNSPECIFIED TYPE: Primary | ICD-10-CM

## 2023-03-29 LAB
APPEARANCE FLUID: CLEAR
BILIRUBIN, POC: ABNORMAL
BLOOD URINE, POC: ABNORMAL
CLARITY, POC: CLEAR
COLOR, POC: YELLOW
GLUCOSE URINE, POC: ABNORMAL
KETONES, POC: ABNORMAL
LEUKOCYTE EST, POC: ABNORMAL
NITRITE, POC: ABNORMAL
PH, POC: 8.5
PROTEIN, POC: ABNORMAL
SPECIFIC GRAVITY, POC: ABNORMAL
UROBILINOGEN, POC: ABNORMAL

## 2023-03-29 PROCEDURE — 3077F SYST BP >= 140 MM HG: CPT | Performed by: FAMILY MEDICINE

## 2023-03-29 PROCEDURE — 1123F ACP DISCUSS/DSCN MKR DOCD: CPT | Performed by: FAMILY MEDICINE

## 2023-03-29 PROCEDURE — 99213 OFFICE O/P EST LOW 20 MIN: CPT | Performed by: FAMILY MEDICINE

## 2023-03-29 PROCEDURE — G8417 CALC BMI ABV UP PARAM F/U: HCPCS | Performed by: FAMILY MEDICINE

## 2023-03-29 PROCEDURE — 1036F TOBACCO NON-USER: CPT | Performed by: FAMILY MEDICINE

## 2023-03-29 PROCEDURE — G8483 FLU IMM NO ADMIN DOC REA: HCPCS | Performed by: FAMILY MEDICINE

## 2023-03-29 PROCEDURE — 3079F DIAST BP 80-89 MM HG: CPT | Performed by: FAMILY MEDICINE

## 2023-03-29 PROCEDURE — 81002 URINALYSIS NONAUTO W/O SCOPE: CPT | Performed by: FAMILY MEDICINE

## 2023-03-29 PROCEDURE — G8427 DOCREV CUR MEDS BY ELIG CLIN: HCPCS | Performed by: FAMILY MEDICINE

## 2023-03-29 ASSESSMENT — ENCOUNTER SYMPTOMS
BACK PAIN: 0
ABDOMINAL PAIN: 0
RESPIRATORY NEGATIVE: 1

## 2023-03-29 ASSESSMENT — PATIENT HEALTH QUESTIONNAIRE - PHQ9
2. FEELING DOWN, DEPRESSED OR HOPELESS: 0
SUM OF ALL RESPONSES TO PHQ QUESTIONS 1-9: 0
SUM OF ALL RESPONSES TO PHQ QUESTIONS 1-9: 0
SUM OF ALL RESPONSES TO PHQ9 QUESTIONS 1 & 2: 0
1. LITTLE INTEREST OR PLEASURE IN DOING THINGS: 0
SUM OF ALL RESPONSES TO PHQ QUESTIONS 1-9: 0
SUM OF ALL RESPONSES TO PHQ QUESTIONS 1-9: 0

## 2023-03-29 NOTE — PROGRESS NOTES
Outpatient Medications on File Prior to Visit   Medication Sig Dispense Refill    metFORMIN (GLUCOPHAGE) 500 MG tablet TAKE 2 TABLETS BY MOUTH 2 TIMES DAILY (WITH MEALS) FOR DIABETES. STOP JANUVIA 360 tablet 3    HYDROcodone-acetaminophen (NORCO) 5-325 MG per tablet       aspirin 325 MG tablet Take 325 mg by mouth daily      furosemide (LASIX) 20 MG tablet Take 20 mg by mouth      sertraline (ZOLOFT) 50 MG tablet 1 tablet by mouth at bedtime for anxiety 90 tablet 1    allopurinol (ZYLOPRIM) 100 MG tablet TAKE 1 TABLET TWICE DAILY 180 tablet 3    doxepin (SINEQUAN) 25 MG capsule Take 1 capsule by mouth nightly For sleep and anxiety 30 capsule 3    cloNIDine (CATAPRES) 0.1 MG tablet TAKE 1 TABLET TWICE DAILY 180 tablet 3    amLODIPine (NORVASC) 10 MG tablet TAKE 1 TABLET EVERY NIGHT 90 tablet 3    ranolazine (RANEXA) 500 MG extended release tablet Take 1 tablet by mouth 2 times daily 60 tablet 3    indapamide (LOZOL) 1.25 MG tablet Take 1 tablet by mouth 2 times daily 180 tablet 3    vitamin B-12 (CYANOCOBALAMIN) 1000 MCG tablet Take 1 tablet by mouth daily 90 tablet 1    clopidogrel (PLAVIX) 75 MG tablet Take 75 mg by mouth daily      carvedilol (COREG) 3.125 MG tablet Take 3.125 mg by mouth 2 times daily (with meals)      blood glucose test strips (CONTOUR NEXT TEST) strip 1 each by In Vitro route daily As needed. 100 each 3    Ascorbic Acid (VITAMIN C) 250 MG tablet Take 500 mg by mouth daily       vitamin E 1000 UNITS capsule Take 400 Units by mouth daily        No current facility-administered medications on file prior to visit. OBJECTIVE:    Wt Readings from Last 3 Encounters:   03/29/23 244 lb 6.4 oz (110.9 kg)   03/13/23 239 lb (108.4 kg)   03/07/23 239 lb (108.4 kg)       BP (!) 154/80   Pulse 76   Temp 97.2 °F (36.2 °C)   Resp 18   Ht 5' 10\" (1.778 m)   Wt 244 lb 6.4 oz (110.9 kg)   SpO2 96%   BMI 35.07 kg/m²     Physical Exam  Vitals and nursing note reviewed.    Constitutional:

## 2023-04-24 RX ORDER — ROSUVASTATIN CALCIUM 20 MG/1
20 TABLET, COATED ORAL NIGHTLY
Qty: 90 TABLET | Refills: 3 | Status: SHIPPED | OUTPATIENT
Start: 2023-04-24

## 2023-04-30 RX ORDER — INDAPAMIDE 1.25 MG/1
TABLET, FILM COATED ORAL
Qty: 180 TABLET | Refills: 3 | Status: SHIPPED | OUTPATIENT
Start: 2023-04-30

## 2023-05-02 ENCOUNTER — OFFICE VISIT (OUTPATIENT)
Dept: UROLOGY | Age: 83
End: 2023-05-02
Payer: MEDICARE

## 2023-05-02 VITALS — WEIGHT: 240 LBS | BODY MASS INDEX: 34.36 KG/M2 | HEIGHT: 70 IN | TEMPERATURE: 97.8 F

## 2023-05-02 DIAGNOSIS — N13.8 BPH WITH OBSTRUCTION/LOWER URINARY TRACT SYMPTOMS: Primary | ICD-10-CM

## 2023-05-02 DIAGNOSIS — N40.1 BPH WITH OBSTRUCTION/LOWER URINARY TRACT SYMPTOMS: Primary | ICD-10-CM

## 2023-05-02 LAB — POST VOID RESIDUAL (PVR): 23 ML

## 2023-05-02 PROCEDURE — G8427 DOCREV CUR MEDS BY ELIG CLIN: HCPCS | Performed by: NURSE PRACTITIONER

## 2023-05-02 PROCEDURE — 1123F ACP DISCUSS/DSCN MKR DOCD: CPT | Performed by: NURSE PRACTITIONER

## 2023-05-02 PROCEDURE — G8417 CALC BMI ABV UP PARAM F/U: HCPCS | Performed by: NURSE PRACTITIONER

## 2023-05-02 PROCEDURE — 51798 US URINE CAPACITY MEASURE: CPT | Performed by: NURSE PRACTITIONER

## 2023-05-02 PROCEDURE — 99214 OFFICE O/P EST MOD 30 MIN: CPT | Performed by: NURSE PRACTITIONER

## 2023-05-02 PROCEDURE — 1036F TOBACCO NON-USER: CPT | Performed by: NURSE PRACTITIONER

## 2023-05-02 RX ORDER — ROSUVASTATIN CALCIUM 20 MG/1
TABLET, COATED ORAL
COMMUNITY
Start: 2023-04-24

## 2023-05-02 ASSESSMENT — ENCOUNTER SYMPTOMS
VOMITING: 0
ABDOMINAL PAIN: 0
NAUSEA: 0
ABDOMINAL DISTENTION: 0
BACK PAIN: 0

## 2023-05-02 NOTE — PROGRESS NOTES
+Arvind Pedroza is a 79 y.o., male, Established patient who presents today   Chief Complaint   Patient presents with    Follow-up     I am here today for my 3-4 week follow up. HPI   Lower urinary tract symptoms. He continues to complain of incomplete emptying, frequency, intermittency, urgency, weak stream, 3x5 or more. Has an AUA symptom twice/35. This is unchanged since his last visit. He was initiated on alpha-blocker therapy. He reports that he has had no significant changes symptoms and he has been experiencing moderate to severe dizziness while on the medication. Previous SIDNEY did reveal a 60 to 70 g plus prostate. REVIEW OF SYSTEMS:  Review of Systems   Constitutional:  Negative for chills and fever. HENT:  Positive for hearing loss. Gastrointestinal:  Negative for abdominal distention, abdominal pain, nausea and vomiting. Genitourinary:  Positive for frequency and urgency. Negative for difficulty urinating, dysuria, flank pain and hematuria. Musculoskeletal:  Negative for back pain and gait problem. Psychiatric/Behavioral:  Negative for agitation and confusion. PHYSICAL EXAM:  Temp 97.8 °F (36.6 °C) (Temporal)   Ht 5' 10\" (1.778 m)   Wt 240 lb (108.9 kg)   BMI 34.44 kg/m²   Physical Exam  Vitals and nursing note reviewed. Constitutional:       General: He is not in acute distress. Appearance: Normal appearance. He is not ill-appearing. Pulmonary:      Effort: Pulmonary effort is normal. No respiratory distress. Abdominal:      General: There is no distension. Tenderness: There is no abdominal tenderness. There is no right CVA tenderness or left CVA tenderness. Neurological:      Mental Status: He is alert and oriented to person, place, and time. Mental status is at baseline.    Psychiatric:         Mood and Affect: Mood normal.         Behavior: Behavior normal.       DATA:  Results for orders placed or performed in visit on 05/02/23   OK Measure, post-void

## 2023-05-15 RX ORDER — CARVEDILOL 3.12 MG/1
TABLET ORAL
Qty: 180 TABLET | Refills: 3 | OUTPATIENT
Start: 2023-05-15

## 2023-05-15 RX ORDER — CLOPIDOGREL BISULFATE 75 MG/1
TABLET ORAL
Qty: 90 TABLET | Refills: 3 | OUTPATIENT
Start: 2023-05-15

## 2023-05-15 RX ORDER — CARVEDILOL 3.12 MG/1
3.12 TABLET ORAL 2 TIMES DAILY WITH MEALS
Qty: 180 TABLET | Refills: 3 | Status: SHIPPED | OUTPATIENT
Start: 2023-05-15

## 2023-05-25 ENCOUNTER — OFFICE VISIT (OUTPATIENT)
Dept: CARDIOLOGY | Facility: CLINIC | Age: 83
End: 2023-05-25
Payer: MEDICARE

## 2023-05-25 VITALS
DIASTOLIC BLOOD PRESSURE: 76 MMHG | OXYGEN SATURATION: 96 % | HEART RATE: 68 BPM | WEIGHT: 226.6 LBS | HEIGHT: 70 IN | BODY MASS INDEX: 32.44 KG/M2 | SYSTOLIC BLOOD PRESSURE: 114 MMHG

## 2023-05-25 DIAGNOSIS — I50.22 CHRONIC SYSTOLIC HEART FAILURE: ICD-10-CM

## 2023-05-25 DIAGNOSIS — E78.2 MIXED HYPERLIPIDEMIA: ICD-10-CM

## 2023-05-25 DIAGNOSIS — I10 PRIMARY HYPERTENSION: ICD-10-CM

## 2023-05-25 DIAGNOSIS — I25.118 CORONARY ARTERY DISEASE OF NATIVE ARTERY OF NATIVE HEART WITH STABLE ANGINA PECTORIS: Primary | ICD-10-CM

## 2023-05-26 NOTE — PROGRESS NOTES
Subjective:     Encounter Date: 5/25/2023      Patient ID: Misael Mcgill is a 83 y.o. male.    Chief Complaint: Routine follow-up of coronary disease, CHF    History of Present Illness    Mr. Mcgill returns today for follow up.     catheterization performed in 05/2022 showed residual disease including a moderate-to-severe stenosis in the proximal circumflex, and a severe focal stenosis in a small distal portion of the circumflex extending into OM1. Of note, he did receive a drug-eluting stent at that time to a severe stenosis of the RCA with a 3.5 x 15 mm drug-eluting stent to the mid portion of the vessel.    He followed up with Dr. Crawford in October 2022 and had been experiencing intermittent orthopnea for 2 to 3 months.  He also reported some mild leg swelling greater on the left.  He denied any exertional dyspnea or chest discomfort of any kind.  Weight was stable.  He was continuing to take Lasix 20 mg daily.  At that visit, Dr. Crawford ordered a BMP, BNP and echocardiogram.    Echocardiogram results were stable-no significant changes compared to previous.  He eventually had lab work that revealed normal BNP.    By way of review, he was hospitalized at [facility] in early December 2022 with pneumonia.    Correspondence in the chart indicates the patient was wishing to eliminate medication and Dr. Crawford instructed him he could discontinue his Plavix in January 2023, but continue aspirin without interruption.    Today the patient presents for follow-up and is doing well.  His only complaint is problems with his balance.  He reports well-controlled blood pressure.  He has lost 17 pounds since his last visit.  He relates this to better diet choices.  He tells me his previously mentioned orthopnea has resolved.  He denies any chest discomfort or shortness of breath.  He states he is able to work with physical therapy and mow the yard without any chest discomfort or shortness of breath.    The following portions of the  patient's history were reviewed and updated as appropriate: allergies, current medications, past family history, past medical history, past social history, past surgical history and problem list.    Review of Systems   Constitutional: Positive for weight loss. Negative for malaise/fatigue.   Cardiovascular:  Negative for chest pain, claudication, dyspnea on exertion, leg swelling, near-syncope, orthopnea, palpitations, paroxysmal nocturnal dyspnea and syncope.   Respiratory:  Negative for cough and shortness of breath.    Hematologic/Lymphatic: Does not bruise/bleed easily.   Musculoskeletal:  Negative for falls.   Gastrointestinal:  Negative for bloating.   Neurological:  Positive for loss of balance. Negative for dizziness, light-headedness and weakness.         Current Outpatient Medications:     allopurinol (ZYLOPRIM) 100 MG tablet, allopurinol 100 mg tablet, Disp: , Rfl:     amLODIPine (NORVASC) 10 MG tablet, amlodipine 10 mg tablet, Disp: , Rfl:     aspirin 81 MG EC tablet, Take 1 tablet by mouth Daily., Disp: , Rfl:     carvedilol (COREG) 3.125 MG tablet, Take 1 tablet by mouth 2 (Two) Times a Day With Meals., Disp: 180 tablet, Rfl: 3    cloNIDine (CATAPRES) 0.1 MG tablet, 1 tablet Daily., Disp: , Rfl:     furosemide (LASIX) 20 MG tablet, Daily., Disp: , Rfl:     indapamide (LOZOL) 1.25 MG tablet, Take 1 tablet by mouth 2 (two) times a day., Disp: 180 tablet, Rfl: 4    metFORMIN (GLUCOPHAGE) 500 MG tablet, Take 2 tablets by mouth 2 (Two) Times a Day With Meals. HOLD FOR 48 HRS POST CATH, Disp: , Rfl:     nitroglycerin (NITROSTAT) 0.4 MG SL tablet, 1 under the tongue as needed for angina, may repeat q5mins for up three doses, Disp: 25 tablet, Rfl: 1    ranolazine (RANEXA) 500 MG 12 hr tablet, TAKE 1 TABLET TWICE DAILY, Disp: 180 tablet, Rfl: 3    rosuvastatin (CRESTOR) 20 MG tablet, TAKE 1 TABLET BY MOUTH EVERY NIGHT., Disp: 90 tablet, Rfl: 3    vitamin B-12 (CYANOCOBALAMIN) 1000 MCG tablet, Take 1 tablet by  mouth Daily., Disp: , Rfl:     vitamin C (ASCORBIC ACID) 250 MG tablet, Take 1 tablet by mouth., Disp: , Rfl:     vitamin E 1000 UNIT capsule, Take 1 capsule by mouth., Disp: , Rfl:        Objective:      Vitals:    05/25/23 0855   BP: 114/76   Pulse: 68   SpO2: 96%   Weight - 226 lbs    Vitals and nursing note reviewed.   Constitutional:       General: Not in acute distress.     Appearance: Not in distress.   Neck:      Vascular: No JVD or JVR. JVD normal.   Pulmonary:      Effort: Pulmonary effort is normal.   Cardiovascular:      Regular rhythm.      Murmurs: There is no murmur.   Edema:     Peripheral edema (1-2+ BLE edema) present.  Skin:     General: Skin is warm and dry.   Neurological:      Mental Status: Alert, oriented to person, place, and time and oriented to person, place and time.       Lab Review:   Lab Results   Component Value Date    GLUCOSE 141 (H) 09/06/2022    BUN 27 (H) 09/06/2022    CREATININE 1.4 (H) 09/06/2022    BCR 17.2 05/25/2021    K 4.7 09/06/2022    CO2 24 09/06/2022    CALCIUM 10.0 09/06/2022    ALBUMIN 4.4 09/06/2022    BILITOT 0.4 09/06/2022    AST 24 09/06/2022    ALT 28 09/06/2022      Lab Results   Component Value Date    CHOL 107 05/05/2021    CHLPL 89 (L) 03/06/2023    TRIG 119 03/06/2023    HDL 49 (L) 03/06/2023    LDL 16 03/06/2023      Lab Results   Component Value Date    HGBA1C 5.3 03/06/2023      Lab Results   Component Value Date    WBC 7.18 03/13/2023    HGB 13.0 (L) 03/13/2023    HCT 41.1 03/13/2023    .1 (H) 03/13/2023     (L) 03/13/2023              ECG 12 Lead    Date/Time: 5/25/2023 6:52 PM  Performed by: Dariela Chauhan APRN  Authorized by: Dariela Chauhan APRN   Comparison: compared with previous ECG from 10/13/2022  Similar to previous ECG  Rhythm: sinus rhythm  BPM: 68  Conduction: 1st degree AV block      10/2022 echo:      Left ventricular systolic function is mildly decreased, with global hypokinesis. Left ventricular ejection fraction appears to  be 46 - 50%.    Estimated right ventricular systolic pressure from tricuspid regurgitation is moderately elevated (45-55 mmHg).    Normal size and function of the right ventricle.    Biatrial enlargement.    No significant (greater than mild) valvular pathology.    Compared to prior exam from 4/2/2021, no significant change.     Assessment/Plan:     Problem List Items Addressed This Visit          Cardiac and Vasculature    Hypertension    Hyperlipidemia    Chronic systolic heart failure (HCC)    Coronary artery disease of native artery of native heart with stable angina pectoris - Primary    Overview     Stable angina / CCS III on OMT with known coronary artery disease (dx with cath 5/5/2021)-severe RCA stenosis status post successful PCI to the mid RCA with a 3.5 x 15 mm drug-eluting stent on 5/24/2021.  (Also has known moderate to severe stenosis in the proximal circumflex, and a severe focal stenosis in a small mid circumflex extending into OM1 noted on initial cath on 5/5)          Assessment/Plan:    1. Chronic systolic heart failure: Stage B, NYHA 1: EF 46-50%.  Euvolemic on exam.  Established problem, stable.  Multiple recent normal BNPs    -Continue current medical therapy (continue Coreg 3.125 twice daily, blood pressure lowering meds, daily diuretic)    2. Coronary artery disease: Stable. Remains angina-free, and has no exertional dyspnea to suggest an equivalent.  - Continue appropriate guideline-directed medical therapy including aspirin, high intensity statin, beta-blocker, Ranexa, as needed sublingual nitroglycerin    3. Essential hypertension: Well-controlled. Continue current regimen.    4. Mixed hyperlipidemia: Continue high-intensity statin to maintain goal LDL < 70.  LDL is very well controlled at 16.    Follow up in 6 months with Dr. Crawford, call sooner with new or worsening symptoms    I spent 35 minutes caring for Misael on this date of service. This time includes time spent by me in the  following activities: preparing for the visit, reviewing tests, obtaining and/or reviewing a separately obtained history, performing a medically appropriate examination and/or evaluation, counseling and educating the patient/family/caregiver, and documenting information in the medical record

## 2023-05-30 ENCOUNTER — TELEPHONE (OUTPATIENT)
Dept: UROLOGY | Age: 83
End: 2023-05-30

## 2023-05-30 NOTE — TELEPHONE ENCOUNTER
I called and spoke to patient. I reiterated that he can come  his samples M-F from 8am-4:30pm. He verbalized understanding.

## 2023-05-30 NOTE — TELEPHONE ENCOUNTER
[address] [facility] desk tried to contact patient to see what samples he is needing. No answer, left VM.  Patient is able to  same during office hours : 8am-4:30pm M-F

## 2023-05-30 NOTE — TELEPHONE ENCOUNTER
Patient returning a called back to [facility] patient said medication that [facility] and patient discussed at his OV on 5-2-23. Patient said the name of medication is vibegron ( GEMTESA) 75 mg . Please called the patient.

## 2023-05-30 NOTE — TELEPHONE ENCOUNTER
Aubrie Fulton requests that nurse  return their call. The best time to reach him is Anytime. Patient said that Erie Opitz would give him some samples for his medication. Please called the patient. Thank you.

## 2023-06-06 ENCOUNTER — OFFICE VISIT (OUTPATIENT)
Dept: PRIMARY CARE CLINIC | Age: 83
End: 2023-06-06

## 2023-06-06 VITALS
WEIGHT: 228.6 LBS | TEMPERATURE: 97 F | RESPIRATION RATE: 18 BRPM | OXYGEN SATURATION: 93 % | BODY MASS INDEX: 32.73 KG/M2 | HEIGHT: 70 IN | DIASTOLIC BLOOD PRESSURE: 76 MMHG | SYSTOLIC BLOOD PRESSURE: 124 MMHG | HEART RATE: 84 BPM

## 2023-06-06 DIAGNOSIS — E16.2 HYPOGLYCEMIA: ICD-10-CM

## 2023-06-06 DIAGNOSIS — B35.1 ONYCHOMYCOSIS: Primary | ICD-10-CM

## 2023-06-06 DIAGNOSIS — E11.9 TYPE 2 DIABETES MELLITUS WITHOUT COMPLICATION, WITHOUT LONG-TERM CURRENT USE OF INSULIN (HCC): ICD-10-CM

## 2023-06-06 RX ORDER — TERBINAFINE HYDROCHLORIDE 250 MG/1
250 TABLET ORAL DAILY
Qty: 42 TABLET | Refills: 0 | Status: SHIPPED | OUTPATIENT
Start: 2023-06-06 | End: 2023-07-18

## 2023-06-07 ASSESSMENT — ENCOUNTER SYMPTOMS
COLOR CHANGE: 0
RHINORRHEA: 0
NAUSEA: 0
DIARRHEA: 0
COUGH: 0
ABDOMINAL PAIN: 0
CONSTIPATION: 0
VOMITING: 0

## 2023-06-07 NOTE — PROGRESS NOTES
distress. Breath sounds: Normal breath sounds. No wheezing. Abdominal:      General: Abdomen is flat. Bowel sounds are normal.      Palpations: Abdomen is soft. Tenderness: There is no abdominal tenderness. Musculoskeletal:      Cervical back: Normal range of motion and neck supple. Lymphadenopathy:      Cervical: No cervical adenopathy. Skin:     General: Skin is warm and dry. Findings: No rash. Comments: Onychomycosis bilaterally   Neurological:      General: No focal deficit present. Mental Status: He is alert and oriented to person, place, and time. Mental status is at baseline. Psychiatric:         Mood and Affect: Mood normal.         Behavior: Behavior normal.         Thought Content: Thought content normal.         Judgment: Judgment normal.      /76   Pulse 84   Temp 97 °F (36.1 °C)   Resp 18   Ht 5' 10\" (1.778 m)   Wt 228 lb 9.6 oz (103.7 kg)   SpO2 93%   BMI 32.80 kg/m²      ASSESSMENT/PLAN:    1. Onychomycosis  -     External Referral To Podiatry  2. Type 2 diabetes mellitus without complication, without long-term current use of insulin ([address]  3. Hypoglycemia       I have given him a new sample meter today to check his sugars with. If he is continuing to have low blood sugars he is to make an appointment to come back and see us. Discussed that it could have been his meter especially since he did not feel bad with the one low sugar. I am going to send Lamisil to the pharmacy for his onychomycosis. I put a referral in for podiatry because he would like a referral for toenail management since they are no longer able to clip them at home. Again he is a diabetic. Follow-up with us for his routine checkup unless he needs a sooner. PDMP Monitoring:    Last PDMP [place] blanca Reviewed [facility]):  Review User Review Instant Review Result   Chip Storey 1/9/2023 12:56 PM Reviewed PDMP [1]       Urine Drug Screenings (1 yr)    No resulted procedures found.

## 2023-06-22 DIAGNOSIS — R42 LIGHTHEADEDNESS: ICD-10-CM

## 2023-06-22 DIAGNOSIS — R29.6 FALLS FREQUENTLY: Primary | ICD-10-CM

## 2023-06-22 DIAGNOSIS — R26.89 BALANCE DISORDER: ICD-10-CM

## 2023-06-22 DIAGNOSIS — R27.0 ATAXIA: ICD-10-CM

## 2023-08-02 ENCOUNTER — OFFICE VISIT (OUTPATIENT)
Dept: UROLOGY | Age: 83
End: 2023-08-02

## 2023-08-02 VITALS — TEMPERATURE: 97.1 F | HEIGHT: 70 IN | BODY MASS INDEX: 32.99 KG/M2 | WEIGHT: 230.4 LBS

## 2023-08-02 DIAGNOSIS — N13.8 BPH WITH OBSTRUCTION/LOWER URINARY TRACT SYMPTOMS: Primary | ICD-10-CM

## 2023-08-02 DIAGNOSIS — N40.1 BPH WITH OBSTRUCTION/LOWER URINARY TRACT SYMPTOMS: Primary | ICD-10-CM

## 2023-08-02 LAB
APPEARANCE FLUID: CLEAR
BILIRUBIN, POC: NORMAL
BLOOD URINE, POC: NORMAL
CLARITY, POC: CLEAR
COLOR, POC: YELLOW
GLUCOSE URINE, POC: NORMAL
KETONES, POC: NORMAL
LEUKOCYTE EST, POC: NORMAL
NITRITE, POC: NORMAL
PH, POC: 6.5
POST VOID RESIDUAL (PVR): 257 ML
PROTEIN, POC: NORMAL
SPECIFIC GRAVITY, POC: 1.02
UROBILINOGEN, POC: 0.2

## 2023-08-02 ASSESSMENT — ENCOUNTER SYMPTOMS
ABDOMINAL DISTENTION: 0
ABDOMINAL PAIN: 0
BACK PAIN: 0
VOMITING: 0
NAUSEA: 0

## 2023-08-02 NOTE — PROGRESS NOTES
spoken language to printed text. The electronic translation of spoken language may be erroneous or, at times, nonsensical words or phrases may be inadvertently transcribed.  Although I have reviewed the document for such errors, some may still exist.
bardport insertion/exchange

## 2023-08-23 ENCOUNTER — OFFICE VISIT (OUTPATIENT)
Dept: NEUROLOGY | Age: 83
End: 2023-08-23
Payer: MEDICARE

## 2023-08-23 DIAGNOSIS — G25.0 ESSENTIAL TREMOR: ICD-10-CM

## 2023-08-23 DIAGNOSIS — R41.3 MEMORY LOSS: ICD-10-CM

## 2023-08-23 DIAGNOSIS — G62.9 NEUROPATHY: ICD-10-CM

## 2023-08-23 DIAGNOSIS — R27.0 ATAXIA: Primary | ICD-10-CM

## 2023-08-23 PROCEDURE — 99215 OFFICE O/P EST HI 40 MIN: CPT | Performed by: PSYCHIATRY & NEUROLOGY

## 2023-08-23 PROCEDURE — G8428 CUR MEDS NOT DOCUMENT: HCPCS | Performed by: PSYCHIATRY & NEUROLOGY

## 2023-08-23 PROCEDURE — 1036F TOBACCO NON-USER: CPT | Performed by: PSYCHIATRY & NEUROLOGY

## 2023-08-23 PROCEDURE — G8417 CALC BMI ABV UP PARAM F/U: HCPCS | Performed by: PSYCHIATRY & NEUROLOGY

## 2023-08-23 PROCEDURE — 1123F ACP DISCUSS/DSCN MKR DOCD: CPT | Performed by: PSYCHIATRY & NEUROLOGY

## 2023-08-25 ENCOUNTER — OFFICE VISIT (OUTPATIENT)
Dept: UROLOGY | Age: 83
End: 2023-08-25

## 2023-08-25 VITALS — TEMPERATURE: 97.9 F | WEIGHT: 232.4 LBS | HEIGHT: 70 IN | BODY MASS INDEX: 33.27 KG/M2

## 2023-08-25 DIAGNOSIS — N13.8 BPH WITH OBSTRUCTION/LOWER URINARY TRACT SYMPTOMS: Primary | ICD-10-CM

## 2023-08-25 DIAGNOSIS — N40.1 BPH WITH OBSTRUCTION/LOWER URINARY TRACT SYMPTOMS: Primary | ICD-10-CM

## 2023-08-25 LAB
APPEARANCE FLUID: CLEAR
BILIRUBIN, POC: NORMAL
BLOOD URINE, POC: NORMAL
CLARITY, POC: CLEAR
COLOR, POC: YELLOW
GLUCOSE URINE, POC: NORMAL
KETONES, POC: NORMAL
LEUKOCYTE EST, POC: NORMAL
NITRITE, POC: NORMAL
PH, POC: 5.5
POST VOID RESIDUAL (PVR): 46 ML
PROTEIN, POC: NORMAL
SPECIFIC GRAVITY, POC: 1.03
UROBILINOGEN, POC: 0.2

## 2023-08-25 ASSESSMENT — ENCOUNTER SYMPTOMS
BACK PAIN: 0
ABDOMINAL PAIN: 0
ABDOMINAL DISTENTION: 0
NAUSEA: 0
VOMITING: 0

## 2023-08-25 NOTE — PROGRESS NOTES
Sinemet. He was warned of potential side effects. We may want to consider a lumbar puncture as well to help exclude NPH although he does not appear to have overt ventriculomegaly out of proportion to his degree of atrophy. all of the above was discussed today. If the trial of Sinemet is ineffective we will consider a large-volume CSF extraction. More than 40 minutes were spent reviewing the patient's records, films, examination and and counseling and coordination of care. More than 50% spent on the latter  Plan    Return in about 4 weeks (around 9/20/2023).     (Please note that portions of this note were completed with a voice recognition program. Efforts were made to edit the dictations but occasionally words are mis-transcribed.)

## 2023-08-25 NOTE — PROGRESS NOTES
translation of spoken language may be erroneous or, at times, nonsensical words or phrases may be inadvertently transcribed.  Although I have reviewed the document for such errors, some may still exist.

## 2023-09-11 ENCOUNTER — NURSE ONLY (OUTPATIENT)
Dept: PRIMARY CARE CLINIC | Age: 83
End: 2023-09-11

## 2023-09-11 DIAGNOSIS — E11.9 TYPE 2 DIABETES MELLITUS WITHOUT COMPLICATION, WITHOUT LONG-TERM CURRENT USE OF INSULIN (HCC): Primary | ICD-10-CM

## 2023-09-11 LAB
ALBUMIN SERPL-MCNC: 4.3 G/DL (ref 3.5–5.2)
ALP SERPL-CCNC: 87 U/L (ref 40–130)
ALT SERPL-CCNC: <5 U/L (ref 5–41)
ANION GAP SERPL CALCULATED.3IONS-SCNC: 11 MMOL/L (ref 7–19)
AST SERPL-CCNC: 18 U/L (ref 5–40)
BILIRUB SERPL-MCNC: 0.3 MG/DL (ref 0.2–1.2)
BUN SERPL-MCNC: 24 MG/DL (ref 8–23)
CALCIUM SERPL-MCNC: 9.3 MG/DL (ref 8.8–10.2)
CHLORIDE SERPL-SCNC: 101 MMOL/L (ref 98–111)
CO2 SERPL-SCNC: 26 MMOL/L (ref 22–29)
CREAT SERPL-MCNC: 1.4 MG/DL (ref 0.5–1.2)
GLUCOSE SERPL-MCNC: 120 MG/DL (ref 74–109)
HBA1C MFR BLD: 5.4 % (ref 4–6)
POTASSIUM SERPL-SCNC: 4.4 MMOL/L (ref 3.5–5)
PROT SERPL-MCNC: 7 G/DL (ref 6.6–8.7)
SODIUM SERPL-SCNC: 138 MMOL/L (ref 136–145)

## 2023-09-13 ENCOUNTER — OFFICE VISIT (OUTPATIENT)
Dept: PRIMARY CARE CLINIC | Age: 83
End: 2023-09-13
Payer: MEDICARE

## 2023-09-13 VITALS
HEIGHT: 70 IN | HEART RATE: 79 BPM | OXYGEN SATURATION: 95 % | TEMPERATURE: 97 F | BODY MASS INDEX: 33.61 KG/M2 | RESPIRATION RATE: 18 BRPM | WEIGHT: 234.8 LBS | SYSTOLIC BLOOD PRESSURE: 148 MMHG | DIASTOLIC BLOOD PRESSURE: 80 MMHG

## 2023-09-13 DIAGNOSIS — E11.9 TYPE 2 DIABETES MELLITUS WITHOUT COMPLICATION, WITHOUT LONG-TERM CURRENT USE OF INSULIN (HCC): Primary | ICD-10-CM

## 2023-09-13 DIAGNOSIS — N18.9 CHRONIC KIDNEY DISEASE, UNSPECIFIED CKD STAGE: ICD-10-CM

## 2023-09-13 DIAGNOSIS — R26.9 GAIT ABNORMALITY: ICD-10-CM

## 2023-09-13 DIAGNOSIS — I10 ESSENTIAL HYPERTENSION: ICD-10-CM

## 2023-09-13 DIAGNOSIS — I10 ELEVATED BLOOD PRESSURE READING IN OFFICE WITH DIAGNOSIS OF HYPERTENSION: ICD-10-CM

## 2023-09-13 PROCEDURE — G8417 CALC BMI ABV UP PARAM F/U: HCPCS | Performed by: FAMILY MEDICINE

## 2023-09-13 PROCEDURE — 3079F DIAST BP 80-89 MM HG: CPT | Performed by: FAMILY MEDICINE

## 2023-09-13 PROCEDURE — 3044F HG A1C LEVEL LT 7.0%: CPT | Performed by: FAMILY MEDICINE

## 2023-09-13 PROCEDURE — G8427 DOCREV CUR MEDS BY ELIG CLIN: HCPCS | Performed by: FAMILY MEDICINE

## 2023-09-13 PROCEDURE — 1036F TOBACCO NON-USER: CPT | Performed by: FAMILY MEDICINE

## 2023-09-13 PROCEDURE — 3077F SYST BP >= 140 MM HG: CPT | Performed by: FAMILY MEDICINE

## 2023-09-13 PROCEDURE — 99214 OFFICE O/P EST MOD 30 MIN: CPT | Performed by: FAMILY MEDICINE

## 2023-09-13 PROCEDURE — 1123F ACP DISCUSS/DSCN MKR DOCD: CPT | Performed by: FAMILY MEDICINE

## 2023-09-13 ASSESSMENT — ENCOUNTER SYMPTOMS
RESPIRATORY NEGATIVE: 1
DIARRHEA: 0
GASTROINTESTINAL NEGATIVE: 1

## 2023-09-13 NOTE — PATIENT INSTRUCTIONS
Wt Readings from Last 3 Encounters:   09/13/23 234 lb 12.8 oz (106.5 kg)   08/25/23 232 lb 6.4 oz (105.4 kg)   08/23/23 230 lb (104.3 kg)          Increase water intake to six 8 ounce glasses of water a day. I am putting in a referral to physical therapy in [place]        Decrease the metformin 500 mg to 1 tablet twice a day with meals        Recheck lab work in 3 months here.

## 2023-09-14 NOTE — PROGRESS NOTES
maintenance    Colon cancer screening. He missed his last colonoscopy that was due a few years back because he had a stent recently placed and was on anticoagulants. He denies any change in bowels, rectal bleeding. He no longer gets colonoscopies due to his age. Prostate cancer screening. He no longer gets PSAs due to his age. Immunization History   Administered Date(s) Administered    COVID-19, Moderna, Booster, PF, 50mcg/0.25ml 12/21/2021    COVID-19, Moderna, Primary or Immunocompromised, PF, 100mcg/0.5mL 02/24/2021, 03/31/2021         Orders Placed This Encounter   Procedures    CBC with Auto Differential    Comprehensive Metabolic Panel    Lactate Dehydrogenase    Iron and TIBC    Vitamin B12    Ferritin    MONOCLONAL PROTEIN AND FLC, SERUM    TSH    Beta 2 Microglobulin, Serum            Return in about 6 months (around 4/3/2024) for Follow up with Dr. Heron Seip, CBC, CMP, QI, SPEP, K/L Light Chains, Iron Panel, B12.

## 2023-09-18 ENCOUNTER — APPOINTMENT (OUTPATIENT)
Dept: INFUSION THERAPY | Age: 83
End: 2023-09-18
Payer: MEDICARE

## 2023-09-29 ENCOUNTER — TELEPHONE (OUTPATIENT)
Dept: HEMATOLOGY | Age: 83
End: 2023-09-29

## 2023-09-29 ENCOUNTER — OFFICE VISIT (OUTPATIENT)
Dept: NEUROLOGY | Age: 83
End: 2023-09-29
Payer: MEDICARE

## 2023-09-29 VITALS
DIASTOLIC BLOOD PRESSURE: 71 MMHG | SYSTOLIC BLOOD PRESSURE: 125 MMHG | BODY MASS INDEX: 33.5 KG/M2 | WEIGHT: 234 LBS | HEART RATE: 65 BPM | HEIGHT: 70 IN

## 2023-09-29 DIAGNOSIS — Z86.39 HISTORY OF DIABETES MELLITUS: ICD-10-CM

## 2023-09-29 DIAGNOSIS — R27.0 ATAXIA: Primary | ICD-10-CM

## 2023-09-29 DIAGNOSIS — G62.9 NEUROPATHY: ICD-10-CM

## 2023-09-29 DIAGNOSIS — G25.0 ESSENTIAL TREMOR: ICD-10-CM

## 2023-09-29 PROCEDURE — G8427 DOCREV CUR MEDS BY ELIG CLIN: HCPCS | Performed by: PSYCHIATRY & NEUROLOGY

## 2023-09-29 PROCEDURE — 99214 OFFICE O/P EST MOD 30 MIN: CPT | Performed by: PSYCHIATRY & NEUROLOGY

## 2023-09-29 PROCEDURE — G8417 CALC BMI ABV UP PARAM F/U: HCPCS | Performed by: PSYCHIATRY & NEUROLOGY

## 2023-09-29 PROCEDURE — 3074F SYST BP LT 130 MM HG: CPT | Performed by: PSYCHIATRY & NEUROLOGY

## 2023-09-29 PROCEDURE — 1123F ACP DISCUSS/DSCN MKR DOCD: CPT | Performed by: PSYCHIATRY & NEUROLOGY

## 2023-09-29 PROCEDURE — 3078F DIAST BP <80 MM HG: CPT | Performed by: PSYCHIATRY & NEUROLOGY

## 2023-09-29 PROCEDURE — 1036F TOBACCO NON-USER: CPT | Performed by: PSYCHIATRY & NEUROLOGY

## 2023-09-29 RX ORDER — ASPIRIN 325 MG
325 TABLET ORAL DAILY
COMMUNITY

## 2023-09-29 NOTE — PROGRESS NOTES
Chief Complaint   Patient presents with    Follow-up     Ataxia  Pt states that Sinemet did not improve things       Izzy Morse is a 79y.o. year old male who is seen for evaluation of ataxia. I initially saw him in February of this year for ataxia. He had been complaining of poor balance since COVID 11/22. He was felt to have mild cognitive impairment and be hard of hearing along with a history of diabetes and hypertension. He had a mild diabetic neuropathy and was sent for gait training to physical therapy. Says it did not improve things. He had an MRI of the brain 1/23 showing right frontal encephalomalacia and some chronic microvascular ischemic changes throughout. Those films are reviewed again today. He falls at times. Last seen here 8/23. He was given a trial of Sinemet at that time. It did not help.   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    Retinal drusen 11/08/2021    Type 2 diabetes mellitus without complication, without long-term current use of insulin ([address]) 09/07/2022    Community acquired bacterial pneumonia 12/02/2022    Chronic systolic heart failure ([address]) 04/27/2021     Resolved Ambulatory Problems     Diagnosis Date Noted    No Resolved Ambulatory Problems     Past Medical History:   Diagnosis Date    Acid reflux     COVID     GERD (gastroesophageal reflux disease)     Hx of blood clots     Hypertension        Past Surgical History:   Procedure Laterality Date    CARDIAC CATHETERIZATION      CARDIOVASCULAR STRESS TEST  07/01/2015    CAROTID ENDARTERECTOMY Left

## 2023-09-29 NOTE — TELEPHONE ENCOUNTER
Called patient and had to leave a vm reminding them of their upcoming appt on 10/03/23.     Electronically signed by Jeanie Lou MA on 9/29/2023 at 3:37 PM

## 2023-09-29 NOTE — PROGRESS NOTES
REVIEW OF SYSTEMS    Constitutional: []Fever []Sweat []Chills [] Recent Injury [x] Denies all unless marked  HEENT:[]Headache  [] Head Injury/Hearing Loss  [] Sore Throat  [] Ear Ache/Dizziness  [x] Denies all unless marked  Spine:  [] Neck pain  [] Back pain  [] Sciaticia  [x] Denies all unless marked  Cardiovascular:[]Heart Disease []Chest Pain [] Palpitations  [x] Denies all unless marked  Pulmonary: []Shortness of Breath []Cough   [x] Denies all unless marke  Gastrointestinal: []Nausea  []Vomiting  []Abdominal Pain  []Constipation  []Diarrhea  []Dark Bloody Stools  [x] Denies all unless marked  Psychiatric/Behavioral:[] Depression [] Anxiety [x] Denies all unless marked  Genitourinary:   [] Frequency  [] Urgency  [] Incontinence [] Pain with Urination  [x] Denies all unless marked  Extremities: []Pain  []Swelling  [x] Denies all unless marked  Musculoskeletal: [] Muscle Pain  [] Joint Pain  [] Arthritis [] Muscle Cramps [] Muscle Twitches  [x] Denies all unless marked  Sleep: [] Insomnia [] Snoring [] Restless Legs [] Sleep Apnea  [] Daytime Sleepiness  [x] Denies all unless marked  Skin:[] Rash [] Skin Discoloration [x] Denies all unless marked   Neurological: []Visual Disturbance/Memory Loss [x] Loss of Balance [] Slurred Speech/Weakness [] Seizures  [x] Vertigo/Dizziness [x] Denies all unless marked

## 2023-10-02 ENCOUNTER — TELEPHONE (OUTPATIENT)
Dept: UROLOGY | Age: 83
End: 2023-10-02

## 2023-10-02 NOTE — TELEPHONE ENCOUNTER
Patient came in office today. He stated he did not consent to the cysto/trus and would not sign the consent. I stated that we could try to get him on just to see the Doctor and he said he didn't want that either and wanted his appointment cancelled. I said I could do this for him, but we do suggest that he see a provider, but he said he didn't want to do this and wanted it cancelled. I cancelled the appointment.

## 2023-10-03 ENCOUNTER — HOSPITAL ENCOUNTER (OUTPATIENT)
Dept: INFUSION THERAPY | Age: 83
Discharge: HOME | End: 2023-10-03
Payer: MEDICARE

## 2023-10-03 ENCOUNTER — OFFICE VISIT (OUTPATIENT)
Dept: HEMATOLOGY | Age: 83
End: 2023-10-03
Payer: MEDICARE

## 2023-10-03 VITALS
BODY MASS INDEX: 33.41 KG/M2 | HEART RATE: 78 BPM | WEIGHT: 233.4 LBS | OXYGEN SATURATION: 95 % | TEMPERATURE: 97.5 F | SYSTOLIC BLOOD PRESSURE: 118 MMHG | DIASTOLIC BLOOD PRESSURE: 68 MMHG | HEIGHT: 70 IN

## 2023-10-03 DIAGNOSIS — D47.2 IGG DEFICIENCY DUE TO MONOCLONAL GAMMOPATHY OF UNDETERMINED SIGNIFICANCE (MGUS) (HCC): ICD-10-CM

## 2023-10-03 DIAGNOSIS — E53.8 B12 DEFICIENCY: ICD-10-CM

## 2023-10-03 DIAGNOSIS — R53.83 OTHER FATIGUE: ICD-10-CM

## 2023-10-03 DIAGNOSIS — D69.6 THROMBOCYTOPENIA (HCC): ICD-10-CM

## 2023-10-03 DIAGNOSIS — D64.9 ANEMIA, UNSPECIFIED TYPE: ICD-10-CM

## 2023-10-03 DIAGNOSIS — D69.6 THROMBOCYTOPENIA (HCC): Primary | ICD-10-CM

## 2023-10-03 DIAGNOSIS — D75.89 MACROCYTOSIS: ICD-10-CM

## 2023-10-03 DIAGNOSIS — D84.81 IGG DEFICIENCY DUE TO MONOCLONAL GAMMOPATHY OF UNDETERMINED SIGNIFICANCE (MGUS) (HCC): ICD-10-CM

## 2023-10-03 DIAGNOSIS — D47.2 GAMMOPATHY: ICD-10-CM

## 2023-10-03 LAB
BASOPHILS # BLD: 0.02 K/UL (ref 0.01–0.08)
BASOPHILS NFR BLD: 0.3 % (ref 0.1–1.2)
EOSINOPHIL # BLD: 0.06 K/UL (ref 0.04–0.54)
EOSINOPHIL NFR BLD: 0.8 % (ref 0.7–7)
ERYTHROCYTE [DISTWIDTH] IN BLOOD BY AUTOMATED COUNT: 12.9 % (ref 11.6–14.4)
FOLATE SERPL-MCNC: 17.4 NG/ML (ref 4.5–32.2)
HCT VFR BLD AUTO: 40 % (ref 40.1–51)
HGB BLD-MCNC: 13.8 G/DL (ref 13.7–17.5)
LYMPHOCYTES # BLD: 1.87 K/UL (ref 1.18–3.74)
LYMPHOCYTES NFR BLD: 25.2 % (ref 19.3–53.1)
MCH RBC QN AUTO: 33.2 PG (ref 25.7–32.2)
MCHC RBC AUTO-ENTMCNC: 34.5 G/DL (ref 32.3–36.5)
MCV RBC AUTO: 96.2 FL (ref 79–92.2)
MONOCYTES # BLD: 0.61 K/UL (ref 0.24–0.82)
MONOCYTES NFR BLD: 8.2 % (ref 4.7–12.5)
NEUTROPHILS # BLD: 4.8 K/UL (ref 1.56–6.13)
NEUTS SEG NFR BLD: 64.7 % (ref 34–71.1)
PLATELET # BLD AUTO: 107 K/UL (ref 163–337)
PMV BLD AUTO: 12.5 FL (ref 7.4–10.4)
RBC # BLD AUTO: 4.16 M/UL (ref 4.63–6.08)
WBC # BLD AUTO: 7.42 K/UL (ref 4.23–9.07)

## 2023-10-03 PROCEDURE — 99214 OFFICE O/P EST MOD 30 MIN: CPT | Performed by: INTERNAL MEDICINE

## 2023-10-03 PROCEDURE — 99212 OFFICE O/P EST SF 10 MIN: CPT

## 2023-10-03 PROCEDURE — G8483 FLU IMM NO ADMIN DOC REA: HCPCS | Performed by: INTERNAL MEDICINE

## 2023-10-03 PROCEDURE — 85025 COMPLETE CBC W/AUTO DIFF WBC: CPT

## 2023-10-03 PROCEDURE — 3074F SYST BP LT 130 MM HG: CPT | Performed by: INTERNAL MEDICINE

## 2023-10-03 PROCEDURE — 1036F TOBACCO NON-USER: CPT | Performed by: INTERNAL MEDICINE

## 2023-10-03 PROCEDURE — G8417 CALC BMI ABV UP PARAM F/U: HCPCS | Performed by: INTERNAL MEDICINE

## 2023-10-03 PROCEDURE — 36415 COLL VENOUS BLD VENIPUNCTURE: CPT

## 2023-10-03 PROCEDURE — 3078F DIAST BP <80 MM HG: CPT | Performed by: INTERNAL MEDICINE

## 2023-10-03 PROCEDURE — 1123F ACP DISCUSS/DSCN MKR DOCD: CPT | Performed by: INTERNAL MEDICINE

## 2023-10-03 PROCEDURE — G8428 CUR MEDS NOT DOCUMENT: HCPCS | Performed by: INTERNAL MEDICINE

## 2023-11-08 RX ORDER — AMLODIPINE BESYLATE 10 MG/1
TABLET ORAL
Qty: 90 TABLET | Refills: 10 | Status: SHIPPED | OUTPATIENT
Start: 2023-11-08

## 2023-12-11 ENCOUNTER — NURSE ONLY (OUTPATIENT)
Dept: PRIMARY CARE CLINIC | Age: 83
End: 2023-12-11

## 2023-12-11 DIAGNOSIS — N18.9 CHRONIC KIDNEY DISEASE, UNSPECIFIED CKD STAGE: ICD-10-CM

## 2023-12-11 LAB
ANION GAP SERPL CALCULATED.3IONS-SCNC: 13 MMOL/L (ref 7–19)
BUN SERPL-MCNC: 34 MG/DL (ref 8–23)
CALCIUM SERPL-MCNC: 9.5 MG/DL (ref 8.8–10.2)
CHLORIDE SERPL-SCNC: 98 MMOL/L (ref 98–111)
CO2 SERPL-SCNC: 22 MMOL/L (ref 22–29)
CREAT SERPL-MCNC: 1.4 MG/DL (ref 0.5–1.2)
GLUCOSE SERPL-MCNC: 123 MG/DL (ref 74–109)
POTASSIUM SERPL-SCNC: 4.6 MMOL/L (ref 3.5–5)
SODIUM SERPL-SCNC: 133 MMOL/L (ref 136–145)

## 2023-12-13 ENCOUNTER — OFFICE VISIT (OUTPATIENT)
Dept: PRIMARY CARE CLINIC | Age: 83
End: 2023-12-13
Payer: MEDICARE

## 2023-12-13 ENCOUNTER — ANCILLARY PROCEDURE (OUTPATIENT)
Dept: PRIMARY CARE CLINIC | Age: 83
End: 2023-12-13

## 2023-12-13 ENCOUNTER — ANCILLARY PROCEDURE (OUTPATIENT)
Dept: PRIMARY CARE CLINIC | Age: 83
End: 2023-12-13
Payer: MEDICARE

## 2023-12-13 VITALS
HEIGHT: 70 IN | OXYGEN SATURATION: 98 % | BODY MASS INDEX: 33.18 KG/M2 | WEIGHT: 231.8 LBS | SYSTOLIC BLOOD PRESSURE: 126 MMHG | HEART RATE: 72 BPM | TEMPERATURE: 96.9 F | RESPIRATION RATE: 18 BRPM | DIASTOLIC BLOOD PRESSURE: 74 MMHG
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
## 2023-12-13 DIAGNOSIS — S51.812A SKIN TEAR OF FOREARM WITHOUT COMPLICATION, LEFT, INITIAL ENCOUNTER: ICD-10-CM

## 2023-12-13 DIAGNOSIS — N18.31 STAGE 3A CHRONIC KIDNEY DISEASE (HCC): ICD-10-CM

## 2023-12-13 DIAGNOSIS — M25.552 BILATERAL HIP PAIN: ICD-10-CM

## 2023-12-13 DIAGNOSIS — E11.9 TYPE 2 DIABETES MELLITUS WITHOUT COMPLICATION, WITHOUT LONG-TERM CURRENT USE OF INSULIN (HCC): ICD-10-CM

## 2023-12-13 DIAGNOSIS — M25.551 BILATERAL HIP PAIN: ICD-10-CM

## 2023-12-13 PROCEDURE — 1123F ACP DISCUSS/DSCN MKR DOCD: CPT | Performed by: FAMILY MEDICINE

## 2023-12-13 PROCEDURE — 99214 OFFICE O/P EST MOD 30 MIN: CPT | Performed by: FAMILY MEDICINE

## 2023-12-13 PROCEDURE — G8427 DOCREV CUR MEDS BY ELIG CLIN: HCPCS | Performed by: FAMILY MEDICINE

## 2023-12-13 PROCEDURE — 3078F DIAST BP <80 MM HG: CPT | Performed by: FAMILY MEDICINE

## 2023-12-13 PROCEDURE — 3074F SYST BP LT 130 MM HG: CPT | Performed by: FAMILY MEDICINE

## 2023-12-13 PROCEDURE — 73523 X-RAY EXAM HIPS BI 5/> VIEWS: CPT | Performed by: FAMILY MEDICINE

## 2023-12-13 PROCEDURE — 3044F HG A1C LEVEL LT 7.0%: CPT | Performed by: FAMILY MEDICINE

## 2023-12-13 PROCEDURE — G8417 CALC BMI ABV UP PARAM F/U: HCPCS | Performed by: FAMILY MEDICINE

## 2023-12-13 PROCEDURE — G8483 FLU IMM NO ADMIN DOC REA: HCPCS | Performed by: FAMILY MEDICINE

## 2023-12-13 PROCEDURE — 1036F TOBACCO NON-USER: CPT | Performed by: FAMILY MEDICINE

## 2023-12-13 ASSESSMENT — ENCOUNTER SYMPTOMS
GASTROINTESTINAL NEGATIVE: 1
RESPIRATORY NEGATIVE: 1

## 2023-12-13 NOTE — PROGRESS NOTES
SUBJECTIVE:    Quentin Diallo is 79 y.o.male who comes in complaining of 3 Month Follow-Up (Diabetes and kidney function )   . HPI: Mr. Dia Argueta comes in today with his wife for follow-up of his diabetes and kidney function. Unfortunately he was going down some steps at [employer] on Sunday and the hand railing broke and he knocked two older women over. He scraped his left forearm, is complaining of bilateral hip pain and has large bruising on his left lower back and hip. He fell again this morning going down 2 steps outside of his house to get to his truck. He hit the back of his head but did not lose consciousness. He denies headache. He had labs done earlier and his kidney function was GFR of 49 which is stable. He is trying to drink more water. Glucose was 123. His last hemoglobin A1c was at nondiabetic levels. He denies any chest pain or shortness of breath. His memory is worsening a little. Other labs done on 12/11/2023 include a BUN of 34 creatinine of 1.5 and sodium slightly decreased at 133. Past Medical History:   Diagnosis Date    Acid reflux     COVID     GERD (gastroesophageal reflux disease)     Hx of blood clots     left leg    Hypertension            No Known Allergies    Social History     Socioeconomic History    Marital status:     Number of children: 3   Occupational History     Employer: [employer]   Tobacco Use    Smoking status: Never    Smokeless tobacco: Never   Vaping Use    Vaping Use: Never used   Substance and Sexual Activity    Alcohol use: No    Drug use: No    Sexual activity: Defer     Social Determinants of Health     Financial Resource Strain: Low Risk  (2/22/2023)    Overall Financial Resource Strain (CARDIA)     Difficulty of Paying Living Expenses: Not hard at all   Transportation Needs: Unknown (2/22/2023)    PRAPARE - Transportation     Lack of Transportation (Non-Medical):  No   Physical Activity: Insufficiently Active (3/4/2023)    Exercise Vital Sign

## 2023-12-13 NOTE — PATIENT INSTRUCTIONS
We are committed to providing you with the best care possible. In order to help us achieve these goals please remember to bring all medications, herbal products, and over the counter supplements with you to each visit. If your provider has ordered testing for you, please be sure to follow up with our office if you have not received results within 7 days after the testing took place. *If you receive a survey after visiting one of our offices, please take time to share your experience concerning your physician office visit. These surveys are confidential and no health information about you is shared. We are eager to improve for you and we are counting on your feedback to help make that happen.        Wt Readings from Last 3 Encounters:   12/13/23 105.1 kg (231 lb 12.8 oz)   10/03/23 105.9 kg (233 lb 6.4 oz)   09/29/23 106.1 kg (234 lb)

## 2024-01-24 DIAGNOSIS — F51.04 CHRONIC INSOMNIA: ICD-10-CM

## 2024-01-24 RX ORDER — ALLOPURINOL 100 MG/1
100 TABLET ORAL 2 TIMES DAILY
Qty: 180 TABLET | Refills: 1 | Status: SHIPPED | OUTPATIENT
Start: 2024-01-24

## 2024-01-24 RX ORDER — INDAPAMIDE 1.25 MG/1
1.25 TABLET ORAL 2 TIMES DAILY
Qty: 180 TABLET | Refills: 1 | Status: SHIPPED | OUTPATIENT
Start: 2024-01-24

## 2024-01-24 RX ORDER — AMLODIPINE BESYLATE 10 MG/1
10 TABLET ORAL NIGHTLY
Qty: 90 TABLET | Refills: 1 | Status: SHIPPED | OUTPATIENT
Start: 2024-01-24

## 2024-01-24 RX ORDER — CLONIDINE HYDROCHLORIDE 0.1 MG/1
0.1 TABLET ORAL 2 TIMES DAILY
Qty: 180 TABLET | Refills: 1 | Status: SHIPPED | OUTPATIENT
Start: 2024-01-24

## 2024-01-24 RX ORDER — DOXEPIN HYDROCHLORIDE 25 MG/1
25 CAPSULE ORAL NIGHTLY
Qty: 90 CAPSULE | Refills: 1 | Status: SHIPPED | OUTPATIENT
Start: 2024-01-24

## 2024-01-24 RX ORDER — RANOLAZINE 500 MG/1
500 TABLET, EXTENDED RELEASE ORAL 2 TIMES DAILY
Qty: 180 TABLET | Refills: 1 | Status: SHIPPED | OUTPATIENT
Start: 2024-01-24

## 2024-01-24 RX ORDER — PERPHENAZINE 16 MG/1
1 TABLET, FILM COATED ORAL DAILY
Qty: 300 EACH | Refills: 1 | Status: SHIPPED | OUTPATIENT
Start: 2024-01-24

## 2024-02-28 ENCOUNTER — OFFICE VISIT (OUTPATIENT)
Dept: PRIMARY CARE CLINIC | Age: 84
End: 2024-02-28
Payer: MEDICARE

## 2024-02-28 ENCOUNTER — ANCILLARY PROCEDURE (OUTPATIENT)
Dept: PRIMARY CARE CLINIC | Age: 84
End: 2024-02-28
Payer: MEDICARE

## 2024-02-28 VITALS
HEART RATE: 73 BPM | TEMPERATURE: 98.1 F | BODY MASS INDEX: 34.55 KG/M2 | DIASTOLIC BLOOD PRESSURE: 82 MMHG | OXYGEN SATURATION: 95 % | WEIGHT: 240.8 LBS | SYSTOLIC BLOOD PRESSURE: 128 MMHG
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
## 2024-02-28 DIAGNOSIS — M25.562 ACUTE PAIN OF LEFT KNEE: ICD-10-CM

## 2024-02-28 DIAGNOSIS — M25.562 ACUTE PAIN OF LEFT KNEE: Primary | ICD-10-CM

## 2024-02-28 PROCEDURE — 3074F SYST BP LT 130 MM HG: CPT | Performed by: FAMILY MEDICINE

## 2024-02-28 PROCEDURE — 73562 X-RAY EXAM OF KNEE 3: CPT | Performed by: FAMILY MEDICINE

## 2024-02-28 PROCEDURE — 3079F DIAST BP 80-89 MM HG: CPT | Performed by: FAMILY MEDICINE

## 2024-02-28 PROCEDURE — 1123F ACP DISCUSS/DSCN MKR DOCD: CPT | Performed by: FAMILY MEDICINE

## 2024-02-28 PROCEDURE — 99214 OFFICE O/P EST MOD 30 MIN: CPT | Performed by: FAMILY MEDICINE

## 2024-02-28 RX ORDER — CEFUROXIME AXETIL 250 MG/1
TABLET ORAL
COMMUNITY
Start: 2023-12-22

## 2024-02-28 ASSESSMENT — ENCOUNTER SYMPTOMS
BLOOD IN STOOL: 0
SHORTNESS OF BREATH: 0
CHEST TIGHTNESS: 0
WHEEZING: 0
ABDOMINAL PAIN: 0

## 2024-02-28 ASSESSMENT — PATIENT HEALTH QUESTIONNAIRE - PHQ9
SUM OF ALL RESPONSES TO PHQ QUESTIONS 1-9: 0
SUM OF ALL RESPONSES TO PHQ QUESTIONS 1-9: 0
2. FEELING DOWN, DEPRESSED OR HOPELESS: 0
SUM OF ALL RESPONSES TO PHQ QUESTIONS 1-9: 0
SUM OF ALL RESPONSES TO PHQ9 QUESTIONS 1 & 2: 0
1. LITTLE INTEREST OR PLEASURE IN DOING THINGS: 0
SUM OF ALL RESPONSES TO PHQ QUESTIONS 1-9: 0

## 2024-02-28 NOTE — PROGRESS NOTES
Arvind Cee (:  1940) is a 84 y.o. male,Established patient, here for evaluation of the following chief complaint(s):  Fall (Lost balance in [employer] parking lot on Friday and landed on left knee on concrete )         ASSESSMENT/PLAN:  1. Acute pain of left knee  -     XR KNEE LEFT (3 VIEWS); Future  2. Fall, initial encounter  -     XR KNEE LEFT (3 VIEWS); Future      Left knee x-ray completed in office and overall normal to my review at this time.  No visible fractures however will pend final review to radiology and manage accordingly.  I think that most of patient's pain is in the setting of acute inflammation from the fall and will likely resolve within the next few weeks.  Patient should continue to ice the area and can continue to take Tylenol.  I did ask him if he would like anything else for pain though patient declines at this time.    Return if symptoms worsen or fail to improve.         Subjective   SUBJECTIVE/OBJECTIVE:  Arvind Cee is a 84 y.o. male who presents following a fall on Friday.  Patient says he was pushing a cart at [facility] when he fell and landed on his left knee.  Patient says he did not strike his head at that time.  Patient says he tried to manage this at home by doing ice application and rest though it continues to hurt and he wanted to have this further evaluated.  Patient says it does not cause any pain to bear weight and he is walking okay.  Patient has taken some Tylenol which is mildly helpful.  Patient says that his knee does not feel unstable.  Patient says he has poor balance though this has been persistent since he had COVID late last year.            Review of Systems   Constitutional:  Negative for activity change and fever.   HENT:  Negative for congestion.    Eyes:  Negative for visual disturbance.   Respiratory:  Negative for chest tightness, shortness of breath and wheezing.    Cardiovascular:  Negative for chest pain.   Gastrointestinal:  Negative for

## 2024-03-12 ENCOUNTER — OFFICE VISIT (OUTPATIENT)
Dept: PRIMARY CARE CLINIC | Age: 84
End: 2024-03-12
Payer: MEDICARE

## 2024-03-12 VITALS
WEIGHT: 242 LBS | DIASTOLIC BLOOD PRESSURE: 78 MMHG | SYSTOLIC BLOOD PRESSURE: 124 MMHG | TEMPERATURE: 98.1 F | BODY MASS INDEX: 34.65 KG/M2 | HEART RATE: 73 BPM | OXYGEN SATURATION: 98 % | HEIGHT: 70 IN | RESPIRATION RATE: 16 BRPM

## 2024-03-12 DIAGNOSIS — M10.072 ACUTE IDIOPATHIC GOUT INVOLVING TOE OF LEFT FOOT: Primary | ICD-10-CM

## 2024-03-12 PROCEDURE — 1123F ACP DISCUSS/DSCN MKR DOCD: CPT | Performed by: FAMILY MEDICINE

## 2024-03-12 PROCEDURE — 3074F SYST BP LT 130 MM HG: CPT | Performed by: FAMILY MEDICINE

## 2024-03-12 PROCEDURE — 3078F DIAST BP <80 MM HG: CPT | Performed by: FAMILY MEDICINE

## 2024-03-12 PROCEDURE — 99213 OFFICE O/P EST LOW 20 MIN: CPT | Performed by: FAMILY MEDICINE

## 2024-03-12 RX ORDER — PREDNISONE 20 MG/1
20 TABLET ORAL 2 TIMES DAILY
Qty: 10 TABLET | Refills: 0 | Status: SHIPPED | OUTPATIENT
Start: 2024-03-12 | End: 2024-03-17

## 2024-03-12 RX ORDER — COLCHICINE 0.6 MG/1
0.6 TABLET ORAL SEE ADMIN INSTRUCTIONS
Qty: 30 TABLET | Refills: 3 | Status: SHIPPED | OUTPATIENT
Start: 2024-03-12

## 2024-03-12 SDOH — ECONOMIC STABILITY: INCOME INSECURITY: HOW HARD IS IT FOR YOU TO PAY FOR THE VERY BASICS LIKE FOOD, HOUSING, MEDICAL CARE, AND HEATING?: NOT HARD AT ALL

## 2024-03-12 SDOH — ECONOMIC STABILITY: FOOD INSECURITY: WITHIN THE PAST 12 MONTHS, YOU WORRIED THAT YOUR FOOD WOULD RUN OUT BEFORE YOU GOT MONEY TO BUY MORE.: NEVER TRUE

## 2024-03-12 SDOH — ECONOMIC STABILITY: FOOD INSECURITY: WITHIN THE PAST 12 MONTHS, THE FOOD YOU BOUGHT JUST DIDN'T LAST AND YOU DIDN'T HAVE MONEY TO GET MORE.: NEVER TRUE

## 2024-03-12 ASSESSMENT — ENCOUNTER SYMPTOMS
SHORTNESS OF BREATH: 0
BLOOD IN STOOL: 0
CHEST TIGHTNESS: 0
WHEEZING: 0
ABDOMINAL PAIN: 0

## 2024-03-12 NOTE — PROGRESS NOTES
07/01/2015    CAROTID ENDARTERECTOMY Left 06/28/2016    CAROTID ENDARTERECTOMY WITH GORETEX ACUSEAL PATCH ANGIOPLASTY performed by Gena Antoine MD at [facility] OR    CARPAL TUNNEL RELEASE      COLONOSCOPY      LEG SURGERY      (L) blood clot    VASCULAR SURGERY  7/14/2015 [facility]    Eversion right carotid endarterectomy       No Known Allergies     Lab Results   Component Value Date     (L) 12/11/2023    K 4.6 12/11/2023    CL 98 12/11/2023    CO2 22 12/11/2023    BUN 34 (H) 12/11/2023    CREATININE 1.4 (H) 12/11/2023    GLUCOSE 123 (H) 12/11/2023    CALCIUM 9.5 12/11/2023    PROT 7.0 09/11/2023    LABALBU 4.3 09/11/2023    BILITOT 0.3 09/11/2023    ALKPHOS 87 09/11/2023    AST 18 09/11/2023    ALT <5 (A) 09/11/2023    LABGLOM 49 (A) 12/11/2023    GFRAA 58 (L) 09/06/2022    AGRATIO 1.1 10/11/2021    GLOB 2.8 02/08/2022        Lab Results   Component Value Date    WBC 7.42 10/03/2023    HGB 13.8 10/03/2023    HCT 40.0 (L) 10/03/2023    MCV 96.2 (H) 10/03/2023     (L) 10/03/2023                EMR Dragon/transcription disclaimer:  Much of this encounter note is electronic transcription/translation of spoken language toprinted texts.  The electronic translation of spoken language may be erroneous, or at times, nonsensical words or phrases may be inadvertently transcribed.  Although I have reviewed the note for such errors, some may stillexist.      An electronic signature was used to authenticate this note.    --Nakul Varma MD

## (undated) DEVICE — ANGIO-SEAL VIP VASCULAR CLOSURE DEVICE: Brand: ANGIO-SEAL

## (undated) DEVICE — GW STARTER FXD CORE J .035 3X150CM 3MM

## (undated) DEVICE — PK CATH CARD 30 CA/4

## (undated) DEVICE — RADIFOCUS OPTITORQUE ANGIOGRAPHIC CATHETER: Brand: OPTITORQUE

## (undated) DEVICE — SOLIDIFIER LIQUI LOC PLUS 2000CC

## (undated) DEVICE — TR BAND RADIAL ARTERY COMPRESSION DEVICE: Brand: TR BAND

## (undated) DEVICE — KT VLV HEMO MAP ACC PLS LG/BORE MTL/INTRO

## (undated) DEVICE — SOL IRR NACL 0.9PCT BT 1000ML

## (undated) DEVICE — Device

## (undated) DEVICE — DRSNG SURESITE WNDW 4X4.5

## (undated) DEVICE — CANN CO2/O2 NASL A/

## (undated) DEVICE — GUIDELINER CATHETERS ARE INTENDED TO BE USED IN CONJUNCTION WITH GUIDE CATHETERS TO ACCESS DISCRETE REGIONS OF THE CORONARY AND/OR PERIPHERAL VASCULATURE, AND TO FACILITATE PLACEMENT OF INTERVENTIONAL DEVICES.: Brand: GUIDELINER® V3 CATHETER

## (undated) DEVICE — 6F .070 AR 2 100CM: Brand: VISTA BRITE TIP

## (undated) DEVICE — ELECTRD PAD DEFIB A/

## (undated) DEVICE — SUP ARMBRD ART/LINE BLU

## (undated) DEVICE — 6F .070 H-STK 100CM: Brand: VISTA BRITE TIP

## (undated) DEVICE — TIBURON BRACHIAL ANGIOGRAPHY DRAPE: Brand: CONVERTORS

## (undated) DEVICE — INF TL MULIPACK FR6: Brand: INFINITI

## (undated) DEVICE — INFLATION DEVICE: Brand: ENCORE™ 26

## (undated) DEVICE — MODEL BT2000 P/N 700287-012KIT CONTENTS: MANIFOLD WITH SALINE AND CONTRAST PORTS, SALINE TUBING WITH SPIKE AND HAND SYRINGE, TRANSDUCER: Brand: BT2000 AUTOMATED MANIFOLD KIT

## (undated) DEVICE — HI-TORQUE WHISPER ES GUIDE WIRE .014 STRAIGHTTIP 3.0 CM X 190 CM: Brand: HI-TORQUE WHISPER

## (undated) DEVICE — PTCA DILATATION CATHETER: Brand: EMERGE™

## (undated) DEVICE — DEV TORQ GW HOT/PINK

## (undated) DEVICE — GLIDESHEATH SLENDER STAINLESS STEEL KIT: Brand: GLIDESHEATH SLENDER

## (undated) DEVICE — TREK CORONARY DILATATION CATHETER 3.0 MM X 8 MM / RAPID-EXCHANGE: Brand: TREK

## (undated) DEVICE — PINNACLE INTRODUCER SHEATH: Brand: PINNACLE

## (undated) DEVICE — Device: Brand: MEDEX

## (undated) DEVICE — 6F .070 AL.75 100CM: Brand: CORDIS

## (undated) DEVICE — MODEL AT P65, P/N 701554-001KIT CONTENTS: HAND CONTROLLER, 3-WAY HIGH-PRESSURE STOPCOCK WITH ROTATING END AND PREMIUM HIGH-PRESSURE TUBING: Brand: ANGIOTOUCH® KIT

## (undated) DEVICE — 6F .070 JR 4 100CM: Brand: CORDIS

## (undated) DEVICE — 6F .070 AR 1 100CM: Brand: VISTA BRITE TIP